# Patient Record
Sex: MALE | Race: WHITE | NOT HISPANIC OR LATINO | Employment: FULL TIME | URBAN - METROPOLITAN AREA
[De-identification: names, ages, dates, MRNs, and addresses within clinical notes are randomized per-mention and may not be internally consistent; named-entity substitution may affect disease eponyms.]

---

## 2023-06-24 ENCOUNTER — HOSPITAL ENCOUNTER (EMERGENCY)
Facility: MEDICAL CENTER | Age: 58
End: 2023-06-24
Attending: EMERGENCY MEDICINE
Payer: COMMERCIAL

## 2023-06-24 VITALS
DIASTOLIC BLOOD PRESSURE: 72 MMHG | TEMPERATURE: 97.5 F | HEART RATE: 68 BPM | BODY MASS INDEX: 34.16 KG/M2 | SYSTOLIC BLOOD PRESSURE: 112 MMHG | WEIGHT: 252.21 LBS | HEIGHT: 72 IN | OXYGEN SATURATION: 96 % | RESPIRATION RATE: 16 BRPM

## 2023-06-24 DIAGNOSIS — S51.812A LACERATION OF LEFT FOREARM, INITIAL ENCOUNTER: ICD-10-CM

## 2023-06-24 PROCEDURE — 90471 IMMUNIZATION ADMIN: CPT

## 2023-06-24 PROCEDURE — 303747 HCHG EXTRA SUTURE

## 2023-06-24 PROCEDURE — 700111 HCHG RX REV CODE 636 W/ 250 OVERRIDE (IP): Performed by: EMERGENCY MEDICINE

## 2023-06-24 PROCEDURE — 90715 TDAP VACCINE 7 YRS/> IM: CPT | Performed by: EMERGENCY MEDICINE

## 2023-06-24 PROCEDURE — 304999 HCHG REPAIR-SIMPLE/INTERMED LEVEL 1

## 2023-06-24 PROCEDURE — 700102 HCHG RX REV CODE 250 W/ 637 OVERRIDE(OP): Performed by: EMERGENCY MEDICINE

## 2023-06-24 PROCEDURE — 700101 HCHG RX REV CODE 250: Performed by: EMERGENCY MEDICINE

## 2023-06-24 PROCEDURE — A9270 NON-COVERED ITEM OR SERVICE: HCPCS | Performed by: EMERGENCY MEDICINE

## 2023-06-24 PROCEDURE — 304217 HCHG IRRIGATION SYSTEM

## 2023-06-24 PROCEDURE — 99283 EMERGENCY DEPT VISIT LOW MDM: CPT

## 2023-06-24 RX ORDER — LIDOCAINE HYDROCHLORIDE 20 MG/ML
20 INJECTION, SOLUTION INFILTRATION; PERINEURAL ONCE
Status: COMPLETED | OUTPATIENT
Start: 2023-06-24 | End: 2023-06-24

## 2023-06-24 RX ORDER — KETOROLAC TROMETHAMINE 10 MG/1
10 TABLET, FILM COATED ORAL 3 TIMES DAILY PRN
Qty: 15 TABLET | Refills: 0 | Status: SHIPPED | OUTPATIENT
Start: 2023-06-24

## 2023-06-24 RX ORDER — CEPHALEXIN 500 MG/1
500 CAPSULE ORAL ONCE
Status: COMPLETED | OUTPATIENT
Start: 2023-06-24 | End: 2023-06-24

## 2023-06-24 RX ORDER — IBUPROFEN 600 MG/1
600 TABLET ORAL ONCE
Status: COMPLETED | OUTPATIENT
Start: 2023-06-24 | End: 2023-06-24

## 2023-06-24 RX ORDER — CEPHALEXIN 500 MG/1
500 CAPSULE ORAL 4 TIMES DAILY
Qty: 40 CAPSULE | Refills: 0 | Status: ACTIVE | OUTPATIENT
Start: 2023-06-24

## 2023-06-24 RX ADMIN — IBUPROFEN 600 MG: 600 TABLET, FILM COATED ORAL at 21:08

## 2023-06-24 RX ADMIN — CLOSTRIDIUM TETANI TOXOID ANTIGEN (FORMALDEHYDE INACTIVATED), CORYNEBACTERIUM DIPHTHERIAE TOXOID ANTIGEN (FORMALDEHYDE INACTIVATED), BORDETELLA PERTUSSIS TOXOID ANTIGEN (GLUTARALDEHYDE INACTIVATED), BORDETELLA PERTUSSIS FILAMENTOUS HEMAGGLUTININ ANTIGEN (FORMALDEHYDE INACTIVATED), BORDETELLA PERTUSSIS PERTACTIN ANTIGEN, AND BORDETELLA PERTUSSIS FIMBRIAE 2/3 ANTIGEN 0.5 ML: 5; 2; 2.5; 5; 3; 5 INJECTION, SUSPENSION INTRAMUSCULAR at 21:07

## 2023-06-24 RX ADMIN — LIDOCAINE HYDROCHLORIDE 20 ML: 20 INJECTION, SOLUTION INFILTRATION; PERINEURAL at 20:30

## 2023-06-24 RX ADMIN — CEPHALEXIN 500 MG: 500 CAPSULE ORAL at 21:08

## 2023-06-25 NOTE — ED TRIAGE NOTES
Chief Complaint   Patient presents with    Arm Injury     The pt was working with tension loaded spring on overhead garage door. The pt sustained a laceration to left forearm. Bleeding controled in triage. The positive csm in the affected extremity. No other trauma       Pt ambulatory to triage. Pt A&Ox4, for tian shasta complaint.     Pt to lobby . Pt educated on alerting staff in changes to condition. Pt verbalized understanding.      /71   Pulse 66   Temp 36.4 °C (97.5 °F) (Temporal)   Resp 18   Ht 1.829 m (6')   Wt 114 kg (252 lb 3.3 oz)   SpO2 96% Comment: Room air  BMI 34.21 kg/m²

## 2023-06-25 NOTE — ED PROVIDER NOTES
ER Provider Note    Scribed for Dr. Edna Bridges D.O. by Marquez Fernandez. 6/24/2023  7:57 PM    Primary Care Provider: ZOHAIB Vaughan    CHIEF COMPLAINT  Chief Complaint   Patient presents with    Arm Injury     The pt was working with tension loaded spring on overhead garage door. The pt sustained a laceration to left forearm. Bleeding controled in triage. The positive csm in the affected extremity. No other trauma       EXTERNAL RECORDS REVIEWED  Care everywhere The patient was last seen 8/01/2012 for a head laceration in the ED.     HPI/ROS  LIMITATION TO HISTORY   Select: : None    OUTSIDE HISTORIAN(S):  Family at bedside    Marlo Gusman is a 58 y.o. male who presents to the ED for an injury to the left forearm onset prior to arrival. The patient states that he his arm got stuck in a metal spring on the garage door while attempting to fix it. Marlo is experiencing associated symptoms of bleeding in his left arm and pain.  He denies any distal paresthesias or weakness in his hand.  The patient is right handed and does not recall last tetanus shot. The patient has a surgical history of a knee replacement previously. The patient is not have any drug related allergies.     PAST MEDICAL HISTORY  History reviewed. No pertinent past medical history.    SURGICAL HISTORY  History reviewed. No pertinent surgical history.    FAMILY HISTORY  History reviewed. No pertinent family history.    SOCIAL HISTORY   reports that he has never smoked. He has never used smokeless tobacco. He reports current alcohol use. He reports current drug use.    CURRENT MEDICATIONS  Previous Medications    ASPIRIN EC (ECOTRIN) 81 MG TBEC    Take 81 mg by mouth every day.    THERAPEUTIC MULTIVITAMIN-MINERALS (THERAGRAN-M) TABS    Take 1 Tab by mouth every day.     ALLERGIES  Patient has no known allergies.    PHYSICAL EXAM  /71   Pulse 66   Temp 36.4 °C (97.5 °F) (Temporal)   Resp 18   Ht 1.829 m (6')   Wt 114 kg (252 lb 3.3  oz)   SpO2 96% Comment: Room air  BMI 34.21 kg/m²   Constitutional: Patient is well developed, well nourished.  Moderate distress from his injury.  HENT: Normocephalic, atraumatic.  Oral mucosa moist.  Cardiovascular: Normal heart rate and Regular rhythm. No murmur   Thorax & Lungs: Clear and equal breath sounds with good excursion. No respiratory distress   Abdomen: Bowel sounds normal in all four quadrants. Soft,nontender  Skin: Warm, Dry   Extremities:  8cm irregular laceration of the left lateral distal forearm extending through the dermis to the tendon. Dirty wound. Good capillary refill with distal sensation to all fingers. Neurovascular intact. Peripheral pulses 4/4      Neurologic: Alert & oriented x 3, Normal motor function, Normal sensory function  Psychiatric: Affect normal, Judgment normal, Mood normal.     PROCEDURES  Laceration Repair Procedure Note    Indication: Laceration    Procedure: The patient was placed in the appropriate position and anesthesia around the laceration was obtained by infiltration using 7cc of 2% Lidocaine with epinephrine. The wound was highly contaminated .The area was then irrigated with high pressure normal saline. The laceration was closed with 5-0 Ethilon using multiple interrupted sutures. There were no additional lacerations requiring repair. The wound area was then dressed with a sterile dressing and a bandage.      Total repaired wound length: 8 cm.     Other Items: Suture count: 21    The patient tolerated the procedure well.    Complications: None    COURSE & MEDICAL DECISION MAKING    ED Observation Status? No; Patient does not meet criteria for ED Observation.     INITIAL ASSESSMENT AND PLAN  Care Narrative:       7:57 PM - Patient seen and evaluated at bedside. The patient was complaining of a injury to his left arm due to an incident while working on his garage door. Discussed plan of care, including laceration repair. Patient agrees to plan of care. Pending  laceration repair.     8:16 PM - I evaluated the patient's medical records and saw that he was not up to date on his tetanus shot. Tetanus given now.    8:53 PM - Patient was reevaluated at bedside. Laceration repair performed, see above. Given his gaping laceration and contamination within the wound, will start on prophylactic Keflex. Will receive one 500 mg capsule here in addition to 600 mg Motrin for pain. Informed him of proper at home care of the laceration. Patient will now be discharged at this time. Discussed return precautions and plan for at home care. Patient verbalizes understanding and agreement to this plan of care.                     DISPOSITION AND DISCUSSIONS  I have discussed management of the patient with the following physicians and ROBERT's: none    Discussion of management with other [facility] or appropriate source(s): None     Escalation of care considered, and ultimately not performed: the patient was evaluated by myself, after discussion I have recommended the patient to be discharged.    Barriers to care at this time, including but not limited to: None    Decision tools and prescription drugs considered including, but not limited to: Antibiotics Keflex started and Pain Medications Motrin for pain.    DISPOSITION:  Patient will be discharged home in stable condition.    FOLLOW UP:  FERNANDO VaughanPKongNKong  [address]  [address] [address] [phone]    Schedule an appointment as soon as possible for a visit in 10 days  For suture removal    OUTPATIENT MEDICATIONS:  New Prescriptions    CEPHALEXIN (KEFLEX) 500 MG CAP    Take 1 Capsule by mouth 4 times a day.    KETOROLAC (TORADOL) 10 MG TAB    Take 1 Tablet by mouth 3 times a day as needed for Moderate Pain. Take with food     FINAL IMPRESSION   1. Laceration of left forearm, initial encounter      I, Marquez Fernandez (Manisha), am scribing for, and in the presence of, Edna Bridges D.O..    Electronically signed by: Marquez  Rebecca (Scribe), 6/24/2023    IEdna D.O. personally performed the services described in this documentation, as scribed by Marquez Fernandez in my presence, and it is both accurate and complete.    The note accurately reflects work and decisions made by me.  Edna Bridges D.O.  6/25/2023  4:17 AM

## 2023-06-25 NOTE — DISCHARGE INSTRUCTIONS
Clean your wound daily with antibacterial soap and water, apply thin layer of Neosporin for the first 3 days only and then keep covered with a padded gauze dressing.  After that you will just clean with antibacterial soap and water, dry and keep open to the air.  Sutures come out in 7 to 10 days with your primary care provider or urgent care.  Tylenol or ibuprofen for pain  Take the antibiotics until completely gone  Return to the ER if any increased redness, swelling, drainage, fever greater than 101 or pain out of proportion.

## 2025-05-14 ENCOUNTER — APPOINTMENT (OUTPATIENT)
Dept: RADIOLOGY | Facility: MEDICAL CENTER | Age: 60
DRG: 871 | End: 2025-05-14
Attending: EMERGENCY MEDICINE
Payer: COMMERCIAL

## 2025-05-14 ENCOUNTER — HOSPITAL ENCOUNTER (INPATIENT)
Facility: MEDICAL CENTER | Age: 60
LOS: 13 days | DRG: 871 | End: 2025-05-27
Attending: EMERGENCY MEDICINE | Admitting: STUDENT IN AN ORGANIZED HEALTH CARE EDUCATION/TRAINING PROGRAM
Payer: COMMERCIAL

## 2025-05-14 ENCOUNTER — APPOINTMENT (OUTPATIENT)
Dept: RADIOLOGY | Facility: MEDICAL CENTER | Age: 60
DRG: 871 | End: 2025-05-14
Payer: COMMERCIAL

## 2025-05-14 DIAGNOSIS — J18.9 PNEUMONIA OF RIGHT LOWER LOBE DUE TO INFECTIOUS ORGANISM: ICD-10-CM

## 2025-05-14 DIAGNOSIS — J90 PLEURAL EFFUSION ON RIGHT: ICD-10-CM

## 2025-05-14 DIAGNOSIS — I48.91 ATRIAL FIBRILLATION WITH RVR (HCC): ICD-10-CM

## 2025-05-14 DIAGNOSIS — F10.90 ALCOHOL USE: ICD-10-CM

## 2025-05-14 DIAGNOSIS — R10.9 FLANK PAIN: Primary | ICD-10-CM

## 2025-05-14 DIAGNOSIS — I95.9 HYPOTENSION, UNSPECIFIED HYPOTENSION TYPE: ICD-10-CM

## 2025-05-14 PROBLEM — N17.9 AKI (ACUTE KIDNEY INJURY) (HCC): Status: ACTIVE | Noted: 2025-05-14

## 2025-05-14 PROBLEM — A41.9 SEPSIS (HCC): Status: RESOLVED | Noted: 2025-05-14 | Resolved: 2025-05-14

## 2025-05-14 PROBLEM — E87.6 HYPOKALEMIA: Status: ACTIVE | Noted: 2025-05-14

## 2025-05-14 PROBLEM — A41.9 SEPSIS (HCC): Status: ACTIVE | Noted: 2025-05-14

## 2025-05-14 PROBLEM — J96.01 ACUTE HYPOXIC RESPIRATORY FAILURE (HCC): Status: ACTIVE | Noted: 2025-05-14

## 2025-05-14 PROBLEM — R91.8 MASS OF LOWER LOBE OF LEFT LUNG: Status: ACTIVE | Noted: 2025-05-14

## 2025-05-14 PROBLEM — E87.29 HIGH ANION GAP METABOLIC ACIDOSIS: Status: ACTIVE | Noted: 2025-05-14

## 2025-05-14 PROBLEM — I10 HYPERTENSION: Status: ACTIVE | Noted: 2025-05-14

## 2025-05-14 LAB
ABO + RH BLD: NORMAL
ABO GROUP BLD: NORMAL
ALBUMIN SERPL BCP-MCNC: 4 G/DL (ref 3.2–4.9)
ALBUMIN/GLOB SERPL: 1.1 G/DL
ALP SERPL-CCNC: 97 U/L (ref 30–99)
ALT SERPL-CCNC: 6 U/L (ref 2–50)
ANION GAP SERPL CALC-SCNC: 17 MMOL/L (ref 7–16)
APTT PPP: 27 SEC (ref 24.7–36)
AST SERPL-CCNC: 12 U/L (ref 12–45)
BASOPHILS # BLD AUTO: 0.4 % (ref 0–1.8)
BASOPHILS # BLD: 0.06 K/UL (ref 0–0.12)
BILIRUB SERPL-MCNC: 1 MG/DL (ref 0.1–1.5)
BLD GP AB SCN SERPL QL: NORMAL
BUN SERPL-MCNC: 28 MG/DL (ref 8–22)
CALCIUM ALBUM COR SERPL-MCNC: 9.2 MG/DL (ref 8.5–10.5)
CALCIUM SERPL-MCNC: 9.2 MG/DL (ref 8.5–10.5)
CHLORIDE SERPL-SCNC: 99 MMOL/L (ref 96–112)
CO2 SERPL-SCNC: 20 MMOL/L (ref 20–33)
CREAT SERPL-MCNC: 1.63 MG/DL (ref 0.5–1.4)
EKG IMPRESSION: NORMAL
EOSINOPHIL # BLD AUTO: 0.17 K/UL (ref 0–0.51)
EOSINOPHIL NFR BLD: 1.3 % (ref 0–6.9)
ERYTHROCYTE [DISTWIDTH] IN BLOOD BY AUTOMATED COUNT: 40.4 FL (ref 35.9–50)
EST. AVERAGE GLUCOSE BLD GHB EST-MCNC: 108 MG/DL
GFR SERPLBLD CREATININE-BSD FMLA CKD-EPI: 48 ML/MIN/1.73 M 2
GLOBULIN SER CALC-MCNC: 3.6 G/DL (ref 1.9–3.5)
GLUCOSE SERPL-MCNC: 111 MG/DL (ref 65–99)
HBA1C MFR BLD: 5.4 % (ref 4–5.6)
HCT VFR BLD AUTO: 44.2 % (ref 42–52)
HGB BLD-MCNC: 15 G/DL (ref 14–18)
IMM GRANULOCYTES # BLD AUTO: 0.08 K/UL (ref 0–0.11)
IMM GRANULOCYTES NFR BLD AUTO: 0.6 % (ref 0–0.9)
INR PPP: 1.15 (ref 0.87–1.13)
LACTATE SERPL-SCNC: 1.2 MMOL/L (ref 0.5–2)
LACTATE SERPL-SCNC: 2.2 MMOL/L (ref 0.5–2)
LDH SERPL L TO P-CCNC: 245 U/L (ref 107–266)
LIPASE SERPL-CCNC: 36 U/L (ref 11–82)
LYMPHOCYTES # BLD AUTO: 1.39 K/UL (ref 1–4.8)
LYMPHOCYTES NFR BLD: 10.4 % (ref 22–41)
MAGNESIUM SERPL-MCNC: 2.2 MG/DL (ref 1.5–2.5)
MCH RBC QN AUTO: 30.4 PG (ref 27–33)
MCHC RBC AUTO-ENTMCNC: 33.9 G/DL (ref 32.3–36.5)
MCV RBC AUTO: 89.5 FL (ref 81.4–97.8)
MONOCYTES # BLD AUTO: 0.74 K/UL (ref 0–0.85)
MONOCYTES NFR BLD AUTO: 5.5 % (ref 0–13.4)
NEUTROPHILS # BLD AUTO: 10.94 K/UL (ref 1.82–7.42)
NEUTROPHILS NFR BLD: 81.8 % (ref 44–72)
NRBC # BLD AUTO: 0 K/UL
NRBC BLD-RTO: 0 /100 WBC (ref 0–0.2)
PLATELET # BLD AUTO: 433 K/UL (ref 164–446)
PMV BLD AUTO: 9.5 FL (ref 9–12.9)
POTASSIUM SERPL-SCNC: 3.4 MMOL/L (ref 3.6–5.5)
PROT SERPL-MCNC: 7.6 G/DL (ref 6–8.2)
PROTHROMBIN TIME: 14.7 SEC (ref 12–14.6)
RBC # BLD AUTO: 4.94 M/UL (ref 4.7–6.1)
RH BLD: NORMAL
SODIUM SERPL-SCNC: 136 MMOL/L (ref 135–145)
TROPONIN T SERPL-MCNC: 9 NG/L (ref 6–19)
WBC # BLD AUTO: 13.4 K/UL (ref 4.8–10.8)

## 2025-05-14 PROCEDURE — 86850 RBC ANTIBODY SCREEN: CPT

## 2025-05-14 PROCEDURE — 86900 BLOOD TYPING SEROLOGIC ABO: CPT | Mod: 91

## 2025-05-14 PROCEDURE — 96375 TX/PRO/DX INJ NEW DRUG ADDON: CPT

## 2025-05-14 PROCEDURE — 700111 HCHG RX REV CODE 636 W/ 250 OVERRIDE (IP): Mod: JZ | Performed by: STUDENT IN AN ORGANIZED HEALTH CARE EDUCATION/TRAINING PROGRAM

## 2025-05-14 PROCEDURE — 700111 HCHG RX REV CODE 636 W/ 250 OVERRIDE (IP): Mod: JZ | Performed by: EMERGENCY MEDICINE

## 2025-05-14 PROCEDURE — 83615 LACTATE (LD) (LDH) ENZYME: CPT

## 2025-05-14 PROCEDURE — 32555 ASPIRATE PLEURA W/ IMAGING: CPT | Performed by: STUDENT IN AN ORGANIZED HEALTH CARE EDUCATION/TRAINING PROGRAM

## 2025-05-14 PROCEDURE — A9270 NON-COVERED ITEM OR SERVICE: HCPCS

## 2025-05-14 PROCEDURE — 85730 THROMBOPLASTIN TIME PARTIAL: CPT

## 2025-05-14 PROCEDURE — 83605 ASSAY OF LACTIC ACID: CPT

## 2025-05-14 PROCEDURE — 700111 HCHG RX REV CODE 636 W/ 250 OVERRIDE (IP): Mod: JZ

## 2025-05-14 PROCEDURE — 83735 ASSAY OF MAGNESIUM: CPT

## 2025-05-14 PROCEDURE — 700101 HCHG RX REV CODE 250: Performed by: EMERGENCY MEDICINE

## 2025-05-14 PROCEDURE — 36415 COLL VENOUS BLD VENIPUNCTURE: CPT

## 2025-05-14 PROCEDURE — 85025 COMPLETE CBC W/AUTO DIFF WBC: CPT

## 2025-05-14 PROCEDURE — 83690 ASSAY OF LIPASE: CPT

## 2025-05-14 PROCEDURE — 87040 BLOOD CULTURE FOR BACTERIA: CPT

## 2025-05-14 PROCEDURE — A9270 NON-COVERED ITEM OR SERVICE: HCPCS | Performed by: EMERGENCY MEDICINE

## 2025-05-14 PROCEDURE — 96361 HYDRATE IV INFUSION ADD-ON: CPT

## 2025-05-14 PROCEDURE — 0WJ93ZZ INSPECTION OF RIGHT PLEURAL CAVITY, PERCUTANEOUS APPROACH: ICD-10-PCS | Performed by: STUDENT IN AN ORGANIZED HEALTH CARE EDUCATION/TRAINING PROGRAM

## 2025-05-14 PROCEDURE — 87641 MR-STAPH DNA AMP PROBE: CPT

## 2025-05-14 PROCEDURE — 96365 THER/PROPH/DIAG IV INF INIT: CPT

## 2025-05-14 PROCEDURE — 94760 N-INVAS EAR/PLS OXIMETRY 1: CPT

## 2025-05-14 PROCEDURE — 700102 HCHG RX REV CODE 250 W/ 637 OVERRIDE(OP)

## 2025-05-14 PROCEDURE — 71045 X-RAY EXAM CHEST 1 VIEW: CPT

## 2025-05-14 PROCEDURE — 32554 ASPIRATE PLEURA W/O IMAGING: CPT

## 2025-05-14 PROCEDURE — 96368 THER/DIAG CONCURRENT INF: CPT

## 2025-05-14 PROCEDURE — 99291 CRITICAL CARE FIRST HOUR: CPT | Mod: 25,GC | Performed by: STUDENT IN AN ORGANIZED HEALTH CARE EDUCATION/TRAINING PROGRAM

## 2025-05-14 PROCEDURE — 83036 HEMOGLOBIN GLYCOSYLATED A1C: CPT

## 2025-05-14 PROCEDURE — 86901 BLOOD TYPING SEROLOGIC RH(D): CPT

## 2025-05-14 PROCEDURE — 93005 ELECTROCARDIOGRAM TRACING: CPT | Mod: TC

## 2025-05-14 PROCEDURE — 84484 ASSAY OF TROPONIN QUANT: CPT

## 2025-05-14 PROCEDURE — 80053 COMPREHEN METABOLIC PANEL: CPT

## 2025-05-14 PROCEDURE — 700105 HCHG RX REV CODE 258: Performed by: EMERGENCY MEDICINE

## 2025-05-14 PROCEDURE — 99291 CRITICAL CARE FIRST HOUR: CPT

## 2025-05-14 PROCEDURE — 93005 ELECTROCARDIOGRAM TRACING: CPT | Mod: TC | Performed by: EMERGENCY MEDICINE

## 2025-05-14 PROCEDURE — 96367 TX/PROPH/DG ADDL SEQ IV INF: CPT

## 2025-05-14 PROCEDURE — 85610 PROTHROMBIN TIME: CPT

## 2025-05-14 PROCEDURE — 700102 HCHG RX REV CODE 250 W/ 637 OVERRIDE(OP): Performed by: EMERGENCY MEDICINE

## 2025-05-14 PROCEDURE — 700117 HCHG RX CONTRAST REV CODE 255: Performed by: EMERGENCY MEDICINE

## 2025-05-14 PROCEDURE — 770022 HCHG ROOM/CARE - ICU (200)

## 2025-05-14 PROCEDURE — 74175 CTA ABDOMEN W/CONTRAST: CPT

## 2025-05-14 RX ORDER — DILTIAZEM HYDROCHLORIDE 5 MG/ML
10 INJECTION INTRAVENOUS ONCE
Status: DISCONTINUED | OUTPATIENT
Start: 2025-05-14 | End: 2025-05-14

## 2025-05-14 RX ORDER — PROMETHAZINE HYDROCHLORIDE 25 MG/1
12.5-25 TABLET ORAL EVERY 4 HOURS PRN
Status: DISCONTINUED | OUTPATIENT
Start: 2025-05-14 | End: 2025-05-27 | Stop reason: HOSPADM

## 2025-05-14 RX ORDER — LOSARTAN POTASSIUM 100 MG/1
100 TABLET ORAL DAILY
COMMUNITY

## 2025-05-14 RX ORDER — IBUPROFEN 200 MG
800 TABLET ORAL
Status: ON HOLD | COMMUNITY
End: 2025-05-27

## 2025-05-14 RX ORDER — HEPARIN SODIUM 5000 [USP'U]/ML
5000 INJECTION, SOLUTION INTRAVENOUS; SUBCUTANEOUS EVERY 8 HOURS
Status: DISCONTINUED | OUTPATIENT
Start: 2025-05-14 | End: 2025-05-15

## 2025-05-14 RX ORDER — HYDROMORPHONE HYDROCHLORIDE 1 MG/ML
0.25 INJECTION, SOLUTION INTRAMUSCULAR; INTRAVENOUS; SUBCUTANEOUS
Status: DISCONTINUED | OUTPATIENT
Start: 2025-05-14 | End: 2025-05-27 | Stop reason: HOSPADM

## 2025-05-14 RX ORDER — ASPIRIN 325 MG
325-650 TABLET ORAL
COMMUNITY

## 2025-05-14 RX ORDER — NOREPINEPHRINE BITARTRATE 0.03 MG/ML
0-1 INJECTION, SOLUTION INTRAVENOUS CONTINUOUS
Status: DISCONTINUED | OUTPATIENT
Start: 2025-05-14 | End: 2025-05-14

## 2025-05-14 RX ORDER — SODIUM CHLORIDE, SODIUM LACTATE, POTASSIUM CHLORIDE, CALCIUM CHLORIDE 600; 310; 30; 20 MG/100ML; MG/100ML; MG/100ML; MG/100ML
1000 INJECTION, SOLUTION INTRAVENOUS ONCE
Status: COMPLETED | OUTPATIENT
Start: 2025-05-14 | End: 2025-05-14

## 2025-05-14 RX ORDER — AMLODIPINE BESYLATE 10 MG/1
10 TABLET ORAL DAILY
COMMUNITY

## 2025-05-14 RX ORDER — AZITHROMYCIN 250 MG/1
500 TABLET, FILM COATED ORAL ONCE
Status: COMPLETED | OUTPATIENT
Start: 2025-05-14 | End: 2025-05-14

## 2025-05-14 RX ORDER — POTASSIUM CHLORIDE 7.45 MG/ML
10 INJECTION INTRAVENOUS
Status: DISPENSED | OUTPATIENT
Start: 2025-05-14 | End: 2025-05-14

## 2025-05-14 RX ORDER — KETOROLAC TROMETHAMINE 15 MG/ML
15 INJECTION, SOLUTION INTRAMUSCULAR; INTRAVENOUS ONCE
Status: COMPLETED | OUTPATIENT
Start: 2025-05-14 | End: 2025-05-14

## 2025-05-14 RX ORDER — CYCLOBENZAPRINE HCL 10 MG
10 TABLET ORAL
COMMUNITY
Start: 2025-05-13 | End: 2025-05-27

## 2025-05-14 RX ORDER — ONDANSETRON 4 MG/1
4 TABLET, ORALLY DISINTEGRATING ORAL EVERY 4 HOURS PRN
Status: DISCONTINUED | OUTPATIENT
Start: 2025-05-14 | End: 2025-05-27 | Stop reason: HOSPADM

## 2025-05-14 RX ORDER — ONDANSETRON 2 MG/ML
4 INJECTION INTRAMUSCULAR; INTRAVENOUS ONCE
Status: COMPLETED | OUTPATIENT
Start: 2025-05-14 | End: 2025-05-14

## 2025-05-14 RX ORDER — BENZONATATE 100 MG/1
100 CAPSULE ORAL 3 TIMES DAILY PRN
Status: ON HOLD | COMMUNITY
Start: 2025-05-13 | End: 2025-05-27

## 2025-05-14 RX ORDER — AZITHROMYCIN 250 MG/1
500 TABLET, FILM COATED ORAL DAILY
Status: COMPLETED | OUTPATIENT
Start: 2025-05-15 | End: 2025-05-16

## 2025-05-14 RX ORDER — HYDROCHLOROTHIAZIDE 25 MG/1
25 TABLET ORAL DAILY
COMMUNITY
End: 2026-01-30

## 2025-05-14 RX ORDER — ACETAMINOPHEN 500 MG
1000 TABLET ORAL EVERY 6 HOURS
Status: DISPENSED | OUTPATIENT
Start: 2025-05-14 | End: 2025-05-19

## 2025-05-14 RX ORDER — AMOXICILLIN 250 MG
2 CAPSULE ORAL EVERY EVENING
Status: DISCONTINUED | OUTPATIENT
Start: 2025-05-15 | End: 2025-05-27 | Stop reason: HOSPADM

## 2025-05-14 RX ORDER — ACETAMINOPHEN 500 MG
1000 TABLET ORAL EVERY 6 HOURS PRN
Status: DISCONTINUED | OUTPATIENT
Start: 2025-05-19 | End: 2025-05-23

## 2025-05-14 RX ORDER — POLYETHYLENE GLYCOL 3350 17 G/17G
1 POWDER, FOR SOLUTION ORAL
Status: DISCONTINUED | OUTPATIENT
Start: 2025-05-14 | End: 2025-05-27 | Stop reason: HOSPADM

## 2025-05-14 RX ORDER — NOREPINEPHRINE BITARTRATE 0.03 MG/ML
0-1 INJECTION, SOLUTION INTRAVENOUS CONTINUOUS
Status: DISCONTINUED | OUTPATIENT
Start: 2025-05-14 | End: 2025-05-19

## 2025-05-14 RX ORDER — OXYCODONE HYDROCHLORIDE 5 MG/1
2.5 TABLET ORAL
Refills: 0 | Status: DISCONTINUED | OUTPATIENT
Start: 2025-05-14 | End: 2025-05-27 | Stop reason: HOSPADM

## 2025-05-14 RX ORDER — OXYCODONE HYDROCHLORIDE 5 MG/1
5 TABLET ORAL
Refills: 0 | Status: DISCONTINUED | OUTPATIENT
Start: 2025-05-14 | End: 2025-05-27 | Stop reason: HOSPADM

## 2025-05-14 RX ORDER — ONDANSETRON 2 MG/ML
4 INJECTION INTRAMUSCULAR; INTRAVENOUS EVERY 4 HOURS PRN
Status: DISCONTINUED | OUTPATIENT
Start: 2025-05-14 | End: 2025-05-27 | Stop reason: HOSPADM

## 2025-05-14 RX ADMIN — POTASSIUM CHLORIDE 10 MEQ: 10 INJECTION, SOLUTION INTRAVENOUS at 20:41

## 2025-05-14 RX ADMIN — ACETAMINOPHEN 1000 MG: 500 TABLET ORAL at 23:18

## 2025-05-14 RX ADMIN — AZITHROMYCIN DIHYDRATE 500 MG: 250 TABLET ORAL at 18:02

## 2025-05-14 RX ADMIN — ONDANSETRON 4 MG: 2 INJECTION INTRAMUSCULAR; INTRAVENOUS at 17:07

## 2025-05-14 RX ADMIN — SODIUM CHLORIDE, POTASSIUM CHLORIDE, SODIUM LACTATE AND CALCIUM CHLORIDE 1000 ML: 600; 310; 30; 20 INJECTION, SOLUTION INTRAVENOUS at 16:48

## 2025-05-14 RX ADMIN — POTASSIUM CHLORIDE 10 MEQ: 10 INJECTION, SOLUTION INTRAVENOUS at 19:09

## 2025-05-14 RX ADMIN — ACETAMINOPHEN 1000 MG: 500 TABLET ORAL at 19:09

## 2025-05-14 RX ADMIN — FENTANYL CITRATE 100 MCG: 50 INJECTION, SOLUTION INTRAMUSCULAR; INTRAVENOUS at 18:48

## 2025-05-14 RX ADMIN — POTASSIUM CHLORIDE 10 MEQ: 10 INJECTION, SOLUTION INTRAVENOUS at 23:26

## 2025-05-14 RX ADMIN — SODIUM CHLORIDE, POTASSIUM CHLORIDE, SODIUM LACTATE AND CALCIUM CHLORIDE 1000 ML: 600; 310; 30; 20 INJECTION, SOLUTION INTRAVENOUS at 17:15

## 2025-05-14 RX ADMIN — AMPICILLIN AND SULBACTAM 3 G: 1; 2 INJECTION, POWDER, FOR SOLUTION INTRAMUSCULAR; INTRAVENOUS at 18:00

## 2025-05-14 RX ADMIN — IOHEXOL 80 ML: 350 INJECTION, SOLUTION INTRAVENOUS at 17:30

## 2025-05-14 RX ADMIN — KETOROLAC TROMETHAMINE 15 MG: 15 INJECTION, SOLUTION INTRAMUSCULAR; INTRAVENOUS at 20:36

## 2025-05-14 RX ADMIN — NOREPINEPHRINE BITARTRATE 0.05 MCG/KG/MIN: 1 INJECTION, SOLUTION, CONCENTRATE INTRAVENOUS at 18:14

## 2025-05-14 RX ADMIN — SODIUM CHLORIDE, POTASSIUM CHLORIDE, SODIUM LACTATE AND CALCIUM CHLORIDE 1000 ML: 600; 310; 30; 20 INJECTION, SOLUTION INTRAVENOUS at 17:59

## 2025-05-14 RX ADMIN — FENTANYL CITRATE 50 MCG: 50 INJECTION, SOLUTION INTRAMUSCULAR; INTRAVENOUS at 17:07

## 2025-05-14 SDOH — ECONOMIC STABILITY: TRANSPORTATION INSECURITY
IN THE PAST 12 MONTHS, HAS THE LACK OF TRANSPORTATION KEPT YOU FROM MEDICAL APPOINTMENTS OR FROM GETTING MEDICATIONS?: NO

## 2025-05-14 SDOH — ECONOMIC STABILITY: TRANSPORTATION INSECURITY
IN THE PAST 12 MONTHS, HAS LACK OF RELIABLE TRANSPORTATION KEPT YOU FROM MEDICAL APPOINTMENTS, MEETINGS, WORK OR FROM GETTING THINGS NEEDED FOR DAILY LIVING?: NO

## 2025-05-14 ASSESSMENT — PAIN DESCRIPTION - PAIN TYPE
TYPE: ACUTE PAIN

## 2025-05-14 ASSESSMENT — COGNITIVE AND FUNCTIONAL STATUS - GENERAL
DRESSING REGULAR LOWER BODY CLOTHING: A LITTLE
HELP NEEDED FOR BATHING: A LITTLE
DRESSING REGULAR UPPER BODY CLOTHING: A LITTLE
SUGGESTED CMS G CODE MODIFIER MOBILITY: CH
DAILY ACTIVITIY SCORE: 21
MOBILITY SCORE: 24
SUGGESTED CMS G CODE MODIFIER DAILY ACTIVITY: CJ

## 2025-05-14 ASSESSMENT — LIFESTYLE VARIABLES
ON A TYPICAL DAY WHEN YOU DRINK ALCOHOL HOW MANY DRINKS DO YOU HAVE: 0
EVER FELT BAD OR GUILTY ABOUT YOUR DRINKING: NO
DOES PATIENT WANT TO STOP DRINKING: YES
AVERAGE NUMBER OF DAYS PER WEEK YOU HAVE A DRINK CONTAINING ALCOHOL: 0
ALCOHOL_USE: NO
TOTAL SCORE: 1
HAVE YOU EVER FELT YOU SHOULD CUT DOWN ON YOUR DRINKING: YES
DOES PATIENT WANT TO TALK TO SOMEONE ABOUT QUITTING: NO
HAVE PEOPLE ANNOYED YOU BY CRITICIZING YOUR DRINKING: NO
CONSUMPTION TOTAL: NEGATIVE
EVER HAD A DRINK FIRST THING IN THE MORNING TO STEADY YOUR NERVES TO GET RID OF A HANGOVER: NO
TOTAL SCORE: 1
TOTAL SCORE: 1
HOW MANY TIMES IN THE PAST YEAR HAVE YOU HAD 5 OR MORE DRINKS IN A DAY: 0

## 2025-05-14 ASSESSMENT — ENCOUNTER SYMPTOMS
ABDOMINAL PAIN: 0
MYALGIAS: 1
DIZZINESS: 1
SHORTNESS OF BREATH: 1
VOMITING: 0
ORTHOPNEA: 0
COUGH: 1
BLOOD IN STOOL: 0
DIAPHORESIS: 1
WHEEZING: 0
DIARRHEA: 0
FEVER: 1
CHILLS: 1
NAUSEA: 1
SPUTUM PRODUCTION: 0
SORE THROAT: 0
BLURRED VISION: 0
HEMOPTYSIS: 0

## 2025-05-14 ASSESSMENT — SOCIAL DETERMINANTS OF HEALTH (SDOH)
WITHIN THE LAST YEAR, HAVE TO BEEN RAPED OR FORCED TO HAVE ANY KIND OF SEXUAL ACTIVITY BY YOUR PARTNER OR EX-PARTNER?: NO
WITHIN THE PAST 12 MONTHS, THE FOOD YOU BOUGHT JUST DIDN'T LAST AND YOU DIDN'T HAVE MONEY TO GET MORE: NEVER TRUE
WITHIN THE LAST YEAR, HAVE YOU BEEN KICKED, HIT, SLAPPED, OR OTHERWISE PHYSICALLY HURT BY YOUR PARTNER OR EX-PARTNER?: NO
WITHIN THE LAST YEAR, HAVE YOU BEEN HUMILIATED OR EMOTIONALLY ABUSED IN OTHER WAYS BY YOUR PARTNER OR EX-PARTNER?: NO
IN THE PAST 12 MONTHS, HAS THE ELECTRIC, GAS, OIL, OR WATER COMPANY THREATENED TO SHUT OFF SERVICE IN YOUR HOME?: NO
WITHIN THE LAST YEAR, HAVE YOU BEEN AFRAID OF YOUR PARTNER OR EX-PARTNER?: NO

## 2025-05-14 ASSESSMENT — FIBROSIS 4 INDEX: FIB4 SCORE: 0.69

## 2025-05-14 NOTE — ED TRIAGE NOTES
Chief Complaint   Patient presents with    Flank Pain     Right sided flank pain x 5 days, pt denies dysuria but reports pain is darker than normal. +nausea.        59 yo M to triage for above complaint. Abdominal pain protocol ordered.      Pt placed in lobby. Pt educated on triage process. Pt encouraged to alert staff for any changes.     Patient and staff wearing appropriate PPE    BP (!) 127/100   Pulse 88   Temp 36.6 °C (97.8 °F) (Temporal)   Resp (!) 24   Ht 1.829 m (6')   Wt 108 kg (238 lb)   SpO2 98%   BMI 32.28 kg/m²

## 2025-05-14 NOTE — ED PROVIDER NOTES
ER Provider Note    Scribed for Cem Hutton M.d. by Castro Strange. 5/14/2025  4:36 PM    Primary Care Provider: ZOHAIB Vaughan    CHIEF COMPLAINT   Chief Complaint   Patient presents with    Flank Pain     Right sided flank pain x 5 days, pt denies dysuria but reports pain is darker than normal. +nausea.      EXTERNAL RECORDS REVIEWED  Outpatient Notes seen at outpatient yesterday for cough and back pain they thought this was more muscle skeletal will start Flexeril    HPI/ROS  LIMITATION TO HISTORY   Select: : None  OUTSIDE HISTORIAN(S):  Significant other wife present at bedside to provide additional context to history    Marlo Gusman is a 60 y.o. male who presents to the ED complaining of right sided flank pain onset 5 days ago. The patient explains that he developed a worsening right sided flank pain 5 days ago. In response to this the patient decided to be seen in clinic yesterday where he was given muscle relaxants which provided no alleviation. The patient's pain has since persisted and he thus decided to present to the ED for evaluation today. He expresses concern for a possible kidney stone. He states associated shortness of breath, dizziness, and spasms thought his body secondary to the pain. He denies any ripping or tearing pain to his back and he denies his pain radiating to his chest. No testicular pain reported. The patient notes no histories of arrhythmia.     PAST MEDICAL HISTORY  History reviewed. No pertinent past medical history.    SURGICAL HISTORY  History reviewed. No pertinent surgical history.    FAMILY HISTORY  No family history noted.     SOCIAL HISTORY   reports that he has never smoked. He has never used smokeless tobacco. He reports current alcohol use. He reports current drug use.    CURRENT MEDICATIONS  Previous Medications    AMLODIPINE (NORVASC) 10 MG TAB    Take 10 mg by mouth every day.    ASPIRIN (ASA) 325 MG TAB    Take 325-650 mg by mouth 1 time a day as  needed for Mild Pain.    BENZONATATE (TESSALON) 100 MG CAP    Take 100 mg by mouth 3 times a day as needed for Cough. X 10 days    CYCLOBENZAPRINE (FLEXERIL) 10 MG TAB    Take 10 mg by mouth 1 time a day as needed for Muscle Spasms. X 14 days    HYDROCHLOROTHIAZIDE 25 MG TAB    Take 25 mg by mouth every day.    IBUPROFEN (MOTRIN) 200 MG TAB    Take 800 mg by mouth one time as needed for Mild Pain. 800 mg = 4 tabs    LOSARTAN (COZAAR) 100 MG TAB    Take 100 mg by mouth every day.     ALLERGIES  Patient has no known allergies.    PHYSICAL EXAM  BP (!) 127/100   Pulse 88   Temp 36.6 °C (97.8 °F) (Temporal)   Resp (!) 24   Ht 1.829 m (6')   Wt 108 kg (238 lb)   SpO2 98%   BMI 32.28 kg/m²     Constitutional: Well developed, Well nourished, Moderate distress.   HENT: Normocephalic, Atraumatic  Eyes: Conjunctiva normal, No discharge.   Cardiovascular: Tachycardic heart rate, Irregularly irregular rhythm, No murmurs, equal pulses.   Pulmonary: Normal breath sounds, No respiratory distress, No wheezing, No rales, No rhonchi.  Chest: No chest wall tenderness or deformity.   Abdomen:Soft, No tenderness, No pulsatile masses, no rebound, no guarding.   Back: No CVA tenderness. Back pain was not able to be reproduced with palpation.   Musculoskeletal: No major deformities noted, No tenderness.  No edema in his legs.  Skin: Warm, Dry, No erythema, No rash.   Neurologic: Alert & oriented x 3, Normal motor function,  No focal deficits noted.   Psychiatric: Affect normal, Judgment normal, Mood normal.     DIAGNOSTIC STUDIES    EKG/LABS  Results for orders placed or performed during the hospital encounter of 05/14/25   CBC WITH DIFFERENTIAL    Collection Time: 05/14/25  4:03 PM   Result Value Ref Range    WBC 13.4 (H) 4.8 - 10.8 K/uL    RBC 4.94 4.70 - 6.10 M/uL    Hemoglobin 15.0 14.0 - 18.0 g/dL    Hematocrit 44.2 42.0 - 52.0 %    MCV 89.5 81.4 - 97.8 fL    MCH 30.4 27.0 - 33.0 pg    MCHC 33.9 32.3 - 36.5 g/dL    RDW 40.4  35.9 - 50.0 fL    Platelet Count 433 164 - 446 K/uL    MPV 9.5 9.0 - 12.9 fL    Neutrophils-Polys 81.80 (H) 44.00 - 72.00 %    Lymphocytes 10.40 (L) 22.00 - 41.00 %    Monocytes 5.50 0.00 - 13.40 %    Eosinophils 1.30 0.00 - 6.90 %    Basophils 0.40 0.00 - 1.80 %    Immature Granulocytes 0.60 0.00 - 0.90 %    Nucleated RBC 0.00 0.00 - 0.20 /100 WBC    Neutrophils (Absolute) 10.94 (H) 1.82 - 7.42 K/uL    Lymphs (Absolute) 1.39 1.00 - 4.80 K/uL    Monos (Absolute) 0.74 0.00 - 0.85 K/uL    Eos (Absolute) 0.17 0.00 - 0.51 K/uL    Baso (Absolute) 0.06 0.00 - 0.12 K/uL    Immature Granulocytes (abs) 0.08 0.00 - 0.11 K/uL    NRBC (Absolute) 0.00 K/uL   COMP METABOLIC PANEL    Collection Time: 05/14/25  4:03 PM   Result Value Ref Range    Sodium 136 135 - 145 mmol/L    Potassium 3.4 (L) 3.6 - 5.5 mmol/L    Chloride 99 96 - 112 mmol/L    Co2 20 20 - 33 mmol/L    Anion Gap 17.0 (H) 7.0 - 16.0    Glucose 111 (H) 65 - 99 mg/dL    Bun 28 (H) 8 - 22 mg/dL    Creatinine 1.63 (H) 0.50 - 1.40 mg/dL    Calcium 9.2 8.5 - 10.5 mg/dL    Correct Calcium 9.2 8.5 - 10.5 mg/dL    AST(SGOT) 12 12 - 45 U/L    ALT(SGPT) 6 2 - 50 U/L    Alkaline Phosphatase 97 30 - 99 U/L    Total Bilirubin 1.0 0.1 - 1.5 mg/dL    Albumin 4.0 3.2 - 4.9 g/dL    Total Protein 7.6 6.0 - 8.2 g/dL    Globulin 3.6 (H) 1.9 - 3.5 g/dL    A-G Ratio 1.1 g/dL   LIPASE    Collection Time: 05/14/25  4:03 PM   Result Value Ref Range    Lipase 36 11 - 82 U/L   ESTIMATED GFR    Collection Time: 05/14/25  4:03 PM   Result Value Ref Range    GFR (CKD-EPI) 48 (A) >60 mL/min/1.73 m 2   COD (ADULT)    Collection Time: 05/14/25  4:30 PM   Result Value Ref Range    ABO Grouping Only B     Rh Grouping Only POS     Antibody Screen-Cod NEG    APTT    Collection Time: 05/14/25  4:44 PM   Result Value Ref Range    APTT 27.0 24.7 - 36.0 sec   PROTHROMBIN TIME (INR)    Collection Time: 05/14/25  4:44 PM   Result Value Ref Range    PT 14.7 (H) 12.0 - 14.6 sec    INR 1.15 (H) 0.87 - 1.13    TROPONIN    Collection Time: 25  4:44 PM   Result Value Ref Range    Troponin T 9 6 - 19 ng/L   ABO Rh Confirm    Collection Time: 25  4:44 PM   Result Value Ref Range    ABO Rh Confirm B POS    EKG    Collection Time: 25  5:57 PM   Result Value Ref Range    Report       [facility] Emergency Dept.    Test Date:  2025  Pt Name:    ROSELIA MEYERS                 Department: ER  MRN:        [id number]                      Room:       [facility]  Gender:     Male                         Technician: [id number]  :        1965                   Requested By:ER TRIAGE PROTOCOL  Order #:    [id number]                    Reading MD: MAURICE GIBSON MD    Measurements  Intervals                                Axis  Rate:       142                          P:          0  NV:         0                            QRS:        1  QRSD:       100                          T:          36  QT:         317  QTc:        487    Interpretive Statements  Atrial fibrillation, Rate of 142, normal axis, no ST elevation  Abnormal R-wave progression, late transition  Borderline prolonged QT interval  No previous ECG available for comparison  Electronically Signed On 2025 17:57:10 PDT by MAURICE GIBSON MD     12 Lead EKG interpreted by me as shown above.    RADIOLOGY/PROCEDURES   The attending emergency physician has independently interpreted the diagnostic imaging associated with this visit and am waiting the final reading from the radiologist.   My preliminary interpretation is a follows: CTA no obvious aortic dissection or aneurysm.  There is a pleural effusion and consolidation in the right lung base  Radiologist interpretation:  CT-CTA COMPLETE THORACOABDOMINAL AORTA   Final Result      1.  No evidence for aortic aneurysm or dissection.   2.  Loculated right pleural effusion with associated compressive atelectasis and consolidation.   3.  Masslike area of consolidation in the left  posterior medial costophrenic angle. This may be due to infection or neoplasm. Clinical correlation and follow-up is needed.   4.  Hepatomegaly with fatty infiltration of the liver.                          COURSE & MEDICAL DECISION MAKING     ASSESSMENT, COURSE AND PLAN  Care Narrative:     Sepsis: Infection was suspected 5:19 PM (Time). Sepsis pathway was initiated. 30cc/kg bolus given. Antibiotics were given per protocol.    4:26 PM - A triage EKG was ordered by triage nursing staff as per protocol.     4:30 PM - I reviewed the patient's triage EKG which was concerning for atrial fibrillation and abnormal R-wave progressions. The patient was rapidly roomed in response to this.    4:38 PM - Patient seen and examined at bedside. Explained to the patient that his blood pressure and EKG are concerning for a possible aortic dissection. In response to this the patient was upgraded to a code aorta. Discussed plan of care, including imaging/labs to evaluate and fluids/medication for treatment. Patient agrees to the plan of care. The patient will be resuscitated with 1L LR Bolus via IV and medicated with Sublimaze 50 mcg injection and Zofran 4 mg injection. Ordered for CT-CTA Complete Thoracoabdominal Aorta, APTT, Prothrombin Time, Troponin, CBC w/Diff, CMP, Lipase, and UA to evaluate his symptoms. Differential diagnoses include but not limited to: Aortic dissection versus kidney stone versus sepsis versus pneumonia.      4:48 PM - Patient was reevaluated at bedside. The patient's blood pressure is 78/40 and he will receive a second LR Bolus via IV for treatment. The patient will also be medicated with Levophed 8 mg via IV.     4:55 PM - Patient was reevaluated at bedside. His blood pressure has improved to 94 systolic and he can now be taken to the CT scanner for imaging.     5:19 PM - The sepsis pathway was initiated at this time. Ordered Blood Culture X2, Urine Culture, and Lactic Acid to evaluate. The patient will be  medicated with Zithromax 500 mg PO and Unasyn 3 g via IV.     5:31 PM - Patient was reevaluated at bedside. Discussed radiology results with the patient which showed a loculated pleural effusion concerning for mass versus pneumonia. The patient will be resuscitated with additional LR Bolus via IV and medicated with Sublimaze 100 mcg and Cardizem 10 mg injection. Rickie hospitalist.     6:12 PM - Staff informed me that the patient's blood pressure has once again dropped. [place] intensivist for hospitalization at the [facility].     6:20 PM - I discussed the patient's case and the above findings with Dr. Gtz (Intensivist) who agrees to evaluate the patient for hospitalization at the [facility].    HYDRATION: Based on the patient's presentation of Hypotension the patient was given IV fluids. IV Hydration was used because oral hydration was not adequate alone. Upon recheck following hydration, the patient was improved.    CRITICAL CARE  The very real possibility of a deterioration of this patient's condition required the highest level of my preparedness for sudden, emergent intervention.  I provided critical care services, which included medication orders, frequent reevaluations of the patient's condition and response to treatment, ordering and reviewing test results, and discussing the case with various consultants.  The critical care time associated with the care of the patient was 30 minutes. Review chart for interventions. This time is exclusive of any other billable procedures.     PROBLEM LIST  Was seen and evaluated immediately at bedside where he was tachycardic and became hypotensive.  I was initially concerned given the flank pain and some slight chest discomfort that this may be aortic dissection therefore Code aorta ordered.  Patient was started on fluid bolus.  And I ordered some Levophed.  CTA did not show any aortic dissection or aneurysm.  It did show a pleural effusion as well as possible pneumonia in the  right lower lobe.  I think this is likely causing the patient's hypotension from early sepsis.  At this point time given the repetitive hypotension I think he would benefit from admission to the ICU.    Problem 2 atrial fibrillation patient presents with new onset atrial fibrillation his rate was only in the low 140s I do not suspect that this is actually causing the hypotension.  He was given IV fluid boluses but continued to be in atrial fibrillation therefore he was given a dose of diltiazem.    DISPOSITION AND DISCUSSIONS  I have discussed management of the patient with the following physicians and ROBERT's:  Dr. Gtz (Intensivist)    Discussion of management with other [facility] or appropriate source(s): None     Barriers to care at this time, including but not limited to: None.     Decision tools and prescription drugs considered including, but not limited to: Antibiotics Rocephin and azithromycin.    DISPOSITION:  Patient will be hospitalized by Dr. Gtz (Intensivist) in critical condition.    FINAL DIANGOSIS  1. Flank pain    2. Pleural effusion on right    3. Pneumonia of right lower lobe due to infectious organism    4. Atrial fibrillation with RVR (HCC)    5. Hypotension, unspecified hypotension type    5. Critical care time      Castro BATRES (Scribe), am scribing for, and in the presence of, OSIRIS Swift*.    Electronically signed by: Castro Strange (Scribe), 5/14/2025    Cem BATRES M.* personally performed the services described in this documentation, as scribed by Castro Strange in my presence, and it is both accurate and complete.     The note accurately reflects work and decisions made by me.  Cem Hutton M.D.  5/14/2025  7:22 PM

## 2025-05-15 ENCOUNTER — APPOINTMENT (OUTPATIENT)
Dept: CARDIOLOGY | Facility: MEDICAL CENTER | Age: 60
DRG: 871 | End: 2025-05-15
Payer: COMMERCIAL

## 2025-05-15 ENCOUNTER — HOSPITAL ENCOUNTER (OUTPATIENT)
Dept: RADIOLOGY | Facility: MEDICAL CENTER | Age: 60
End: 2025-05-15
Attending: STUDENT IN AN ORGANIZED HEALTH CARE EDUCATION/TRAINING PROGRAM

## 2025-05-15 PROBLEM — E87.6 HYPOKALEMIA: Status: RESOLVED | Noted: 2025-05-14 | Resolved: 2025-05-15

## 2025-05-15 PROBLEM — R57.9 SHOCK (HCC): Status: ACTIVE | Noted: 2025-05-15

## 2025-05-15 LAB
ALBUMIN SERPL BCP-MCNC: 2.9 G/DL (ref 3.2–4.9)
ALBUMIN/GLOB SERPL: 1 G/DL
ALP SERPL-CCNC: 65 U/L (ref 30–99)
ALT SERPL-CCNC: 14 U/L (ref 2–50)
ANION GAP SERPL CALC-SCNC: 9 MMOL/L (ref 7–16)
AST SERPL-CCNC: 16 U/L (ref 12–45)
BASOPHILS # BLD AUTO: 0.3 % (ref 0–1.8)
BASOPHILS # BLD: 0.03 K/UL (ref 0–0.12)
BILIRUB SERPL-MCNC: 0.9 MG/DL (ref 0.1–1.5)
BUN SERPL-MCNC: 24 MG/DL (ref 8–22)
CALCIUM ALBUM COR SERPL-MCNC: 9 MG/DL (ref 8.5–10.5)
CALCIUM SERPL-MCNC: 8.1 MG/DL (ref 8.5–10.5)
CHLORIDE SERPL-SCNC: 103 MMOL/L (ref 96–112)
CO2 SERPL-SCNC: 23 MMOL/L (ref 20–33)
CREAT SERPL-MCNC: 1 MG/DL (ref 0.5–1.4)
EOSINOPHIL # BLD AUTO: 0.08 K/UL (ref 0–0.51)
EOSINOPHIL NFR BLD: 0.7 % (ref 0–6.9)
ERYTHROCYTE [DISTWIDTH] IN BLOOD BY AUTOMATED COUNT: 40.5 FL (ref 35.9–50)
GFR SERPLBLD CREATININE-BSD FMLA CKD-EPI: 86 ML/MIN/1.73 M 2
GLOBULIN SER CALC-MCNC: 3 G/DL (ref 1.9–3.5)
GLUCOSE SERPL-MCNC: 109 MG/DL (ref 65–99)
HCT VFR BLD AUTO: 38.2 % (ref 42–52)
HGB BLD-MCNC: 12.4 G/DL (ref 14–18)
IMM GRANULOCYTES # BLD AUTO: 0.07 K/UL (ref 0–0.11)
IMM GRANULOCYTES NFR BLD AUTO: 0.6 % (ref 0–0.9)
LACTATE SERPL-SCNC: 1 MMOL/L (ref 0.5–2)
LACTATE SERPL-SCNC: 1.9 MMOL/L (ref 0.5–2)
LV EJECT FRACT  99904: 65
LV EJECT FRACT MOD 2C 99903: 64.69
LV EJECT FRACT MOD 4C 99902: 67.8
LV EJECT FRACT MOD BP 99901: 65.73
LYMPHOCYTES # BLD AUTO: 0.86 K/UL (ref 1–4.8)
LYMPHOCYTES NFR BLD: 7.8 % (ref 22–41)
MAGNESIUM SERPL-MCNC: 2.2 MG/DL (ref 1.5–2.5)
MCH RBC QN AUTO: 29.5 PG (ref 27–33)
MCHC RBC AUTO-ENTMCNC: 32.5 G/DL (ref 32.3–36.5)
MCV RBC AUTO: 90.7 FL (ref 81.4–97.8)
MONOCYTES # BLD AUTO: 0.7 K/UL (ref 0–0.85)
MONOCYTES NFR BLD AUTO: 6.3 % (ref 0–13.4)
NEUTROPHILS # BLD AUTO: 9.32 K/UL (ref 1.82–7.42)
NEUTROPHILS NFR BLD: 84.3 % (ref 44–72)
NRBC # BLD AUTO: 0 K/UL
NRBC BLD-RTO: 0 /100 WBC (ref 0–0.2)
PLATELET # BLD AUTO: 301 K/UL (ref 164–446)
PMV BLD AUTO: 9.5 FL (ref 9–12.9)
POTASSIUM SERPL-SCNC: 4 MMOL/L (ref 3.6–5.5)
PROCALCITONIN SERPL-MCNC: 3.03 NG/ML
PROT SERPL-MCNC: 5.9 G/DL (ref 6–8.2)
RBC # BLD AUTO: 4.21 M/UL (ref 4.7–6.1)
SCCMEC + MECA PNL NOSE NAA+PROBE: NEGATIVE
SODIUM SERPL-SCNC: 135 MMOL/L (ref 135–145)
TSH SERPL DL<=0.005 MIU/L-ACNC: 1.15 UIU/ML (ref 0.38–5.33)
WBC # BLD AUTO: 11.1 K/UL (ref 4.8–10.8)

## 2025-05-15 PROCEDURE — 99291 CRITICAL CARE FIRST HOUR: CPT | Performed by: INTERNAL MEDICINE

## 2025-05-15 PROCEDURE — 84443 ASSAY THYROID STIM HORMONE: CPT

## 2025-05-15 PROCEDURE — 93306 TTE W/DOPPLER COMPLETE: CPT

## 2025-05-15 PROCEDURE — 80053 COMPREHEN METABOLIC PANEL: CPT

## 2025-05-15 PROCEDURE — 700111 HCHG RX REV CODE 636 W/ 250 OVERRIDE (IP): Performed by: STUDENT IN AN ORGANIZED HEALTH CARE EDUCATION/TRAINING PROGRAM

## 2025-05-15 PROCEDURE — 700111 HCHG RX REV CODE 636 W/ 250 OVERRIDE (IP): Mod: JZ

## 2025-05-15 PROCEDURE — 85025 COMPLETE CBC W/AUTO DIFF WBC: CPT

## 2025-05-15 PROCEDURE — 700102 HCHG RX REV CODE 250 W/ 637 OVERRIDE(OP)

## 2025-05-15 PROCEDURE — 700105 HCHG RX REV CODE 258

## 2025-05-15 PROCEDURE — 84145 PROCALCITONIN (PCT): CPT

## 2025-05-15 PROCEDURE — 83735 ASSAY OF MAGNESIUM: CPT

## 2025-05-15 PROCEDURE — 83605 ASSAY OF LACTIC ACID: CPT

## 2025-05-15 PROCEDURE — 93306 TTE W/DOPPLER COMPLETE: CPT | Mod: 26 | Performed by: INTERNAL MEDICINE

## 2025-05-15 PROCEDURE — 770022 HCHG ROOM/CARE - ICU (200)

## 2025-05-15 PROCEDURE — 71045 X-RAY EXAM CHEST 1 VIEW: CPT

## 2025-05-15 PROCEDURE — A9270 NON-COVERED ITEM OR SERVICE: HCPCS

## 2025-05-15 PROCEDURE — 700111 HCHG RX REV CODE 636 W/ 250 OVERRIDE (IP): Performed by: INTERNAL MEDICINE

## 2025-05-15 RX ORDER — AZITHROMYCIN 250 MG/1
TABLET, FILM COATED ORAL
Status: COMPLETED
Start: 2025-05-15 | End: 2025-05-15

## 2025-05-15 RX ORDER — POTASSIUM CHLORIDE 7.45 MG/ML
10 INJECTION INTRAVENOUS ONCE
Status: COMPLETED | OUTPATIENT
Start: 2025-05-15 | End: 2025-05-15

## 2025-05-15 RX ORDER — HEPARIN SODIUM 5000 [USP'U]/ML
5000 INJECTION, SOLUTION INTRAVENOUS; SUBCUTANEOUS EVERY 8 HOURS
Status: DISCONTINUED | OUTPATIENT
Start: 2025-05-15 | End: 2025-05-16

## 2025-05-15 RX ADMIN — AMPICILLIN SODIUM, SULBACTAM SODIUM 3 G: 2; 1 INJECTION, POWDER, FOR SOLUTION INTRAMUSCULAR; INTRAVENOUS at 00:03

## 2025-05-15 RX ADMIN — AMPICILLIN SODIUM, SULBACTAM SODIUM 3 G: 2; 1 INJECTION, POWDER, FOR SOLUTION INTRAMUSCULAR; INTRAVENOUS at 05:12

## 2025-05-15 RX ADMIN — OXYCODONE 2.5 MG: 5 TABLET ORAL at 05:53

## 2025-05-15 RX ADMIN — ACETAMINOPHEN 1000 MG: 500 TABLET ORAL at 23:37

## 2025-05-15 RX ADMIN — AMPICILLIN SODIUM, SULBACTAM SODIUM 3 G: 2; 1 INJECTION, POWDER, FOR SOLUTION INTRAMUSCULAR; INTRAVENOUS at 17:37

## 2025-05-15 RX ADMIN — POTASSIUM CHLORIDE 10 MEQ: 7.46 INJECTION, SOLUTION INTRAVENOUS at 00:38

## 2025-05-15 RX ADMIN — AMPICILLIN SODIUM, SULBACTAM SODIUM 3 G: 2; 1 INJECTION, POWDER, FOR SOLUTION INTRAMUSCULAR; INTRAVENOUS at 12:56

## 2025-05-15 RX ADMIN — AMPICILLIN SODIUM, SULBACTAM SODIUM 3 G: 2; 1 INJECTION, POWDER, FOR SOLUTION INTRAMUSCULAR; INTRAVENOUS at 23:36

## 2025-05-15 RX ADMIN — OXYCODONE 2.5 MG: 5 TABLET ORAL at 09:33

## 2025-05-15 RX ADMIN — HEPARIN SODIUM 5000 UNITS: 5000 INJECTION, SOLUTION INTRAVENOUS; SUBCUTANEOUS at 17:35

## 2025-05-15 RX ADMIN — AZITHROMYCIN DIHYDRATE 500 MG: 250 TABLET ORAL at 05:24

## 2025-05-15 RX ADMIN — ACETAMINOPHEN 1000 MG: 500 TABLET ORAL at 12:53

## 2025-05-15 RX ADMIN — ACETAMINOPHEN 1000 MG: 500 TABLET ORAL at 19:11

## 2025-05-15 RX ADMIN — ACETAMINOPHEN 1000 MG: 500 TABLET ORAL at 05:13

## 2025-05-15 ASSESSMENT — PAIN DESCRIPTION - PAIN TYPE
TYPE: ACUTE PAIN

## 2025-05-15 ASSESSMENT — ENCOUNTER SYMPTOMS
CHILLS: 0
ABDOMINAL PAIN: 0
NAUSEA: 0
FEVER: 0
FLANK PAIN: 1
HEMOPTYSIS: 1
SHORTNESS OF BREATH: 1
COUGH: 1
VOMITING: 0

## 2025-05-15 NOTE — PROGRESS NOTES
[facility] Resident Progress Note      Admit Date: 5/14/2025    Resident(s): Melodie ODOM D.O.   Attending:  KIKI RAPP/ Dr. Terry    Patient ID:    Name:  Marlo Gusman   YOB: 1965  Age:  60 y.o.  male   MRN:  [id number]    Hospital Course (carried forward and updated):  Marlo Gusman is a 60 y.o. male with history of HTN, HLD, alcohol use (quit 3 weeks ago) who presented with 2 weeks of cough, fevers, lightheadedness, and generalized myalgias and few days of worsening right flank pain. Found to be in Afib with RVR, mildly hypoxic with loculated R pleural effusion and atelectasis as well as a LLL mass-like consolidation on CTA chest/abdomen. Admitted to ICU for Afib with RVR.    Consultants:  None     Interval Events:  Afib self-converted to SR prior to arrival on floor  Attempted thora, unable to aspirate fluid    N: awake and alert x4, coughing fits with R flank pain, scant hemoptysis since thora day prior  CV: SR/ST , MAP , brief vasoactives, TSH wnl  R: 3L NC, CXR without PTX  FEN/GI: regular diet, last BM PTA  /Renal: sCr 1   Heme: Hgb 12.4, plt 301  ID: WBC 11K, Tm 97.8, bcx 5/14 ngtd, MRSA(-), UA and culture sent  Skin/MSK:   Endo: within range    Bedside echo showing small R pleural effusion without floaters, cont abx, will discuss with IR    Family: updated daughter  Home medications reconciled     Vitals Range last 24h:  Temp:  [35.9 °C (96.6 °F)-36.6 °C (97.8 °F)] 36.4 °C (97.5 °F)  Pulse:  [] 76  Resp:  [17-41] 21  BP: ()/() 92/59  SpO2:  [84 %-100 %] 92 %      Intake/Output Summary (Last 24 hours) at 5/15/2025 0956  Last data filed at 5/15/2025 0600  Gross per 24 hour   Intake 3473.55 ml   Output --   Net 3473.55 ml        Review of Systems   Constitutional:  Negative for chills and fever.   Respiratory:  Positive for cough, hemoptysis and shortness of breath.    Cardiovascular:  Negative for chest pain.   Gastrointestinal:  Negative for  abdominal pain, nausea and vomiting.   Genitourinary:  Positive for flank pain (Right). Negative for dysuria.       PHYSICAL EXAM:  Vitals:    05/15/25 0545 05/15/25 0600 05/15/25 0615 05/15/25 0630   BP:  101/62 95/58 92/59   Pulse: 79 78 81 76   Resp: 19 (!) 21 (!) 22 (!) 21   Temp:       TempSrc:       SpO2: 93% 93% 91% 92%   Weight:       Height:        Body mass index is 32.65 kg/m².    O2 therapy: Pulse Oximetry: 92 %, O2 (LPM): 3, O2 Delivery Device: Silicone Nasal Cannula         Physical Exam  Vitals and nursing note reviewed.   HENT:      Head: Normocephalic.      Nose: Nose normal.      Mouth/Throat:      Mouth: Mucous membranes are moist.   Eyes:      Pupils: Pupils are equal, round, and reactive to light.   Cardiovascular:      Rate and Rhythm: Normal rate.      Pulses: Normal pulses.   Pulmonary:      Effort: Pulmonary effort is normal. No respiratory distress.      Breath sounds: Rales present. No wheezing.   Abdominal:      General: Abdomen is flat. There is no distension.      Palpations: Abdomen is soft.   Musculoskeletal:      Right lower leg: No edema.      Left lower leg: No edema.   Skin:     General: Skin is warm.      Capillary Refill: Capillary refill takes less than 2 seconds.   Neurological:      General: No focal deficit present.      Mental Status: He is alert and oriented to person, place, and time.             Recent Labs     05/14/25  1603 05/14/25  1644 05/15/25  0325   SODIUM 136  --  135   POTASSIUM 3.4*  --  4.0   CHLORIDE 99  --  103   CO2 20  --  23   BUN 28*  --  24*   CREATININE 1.63*  --  1.00   MAGNESIUM  --  2.2 2.2   CALCIUM 9.2  --  8.1*     Recent Labs     05/14/25  1603 05/15/25  0325   ALTSGPT 6 14   ASTSGOT 12 16   ALKPHOSPHAT 97 65   TBILIRUBIN 1.0 0.9   LIPASE 36  --    GLUCOSE 111* 109*     Recent Labs     05/14/25  1603 05/14/25  1644 05/15/25  0325   RBC 4.94  --  4.21*   HEMOGLOBIN 15.0  --  12.4*   HEMATOCRIT 44.2  --  38.2*   PLATELETCT 433  --  057    PROTHROMBTM  --  14.7*  --    APTT  --  27.0  --    INR  --  1.15*  --      Recent Labs     05/14/25  1603 05/15/25  0325   WBC 13.4* 11.1*   NEUTSPOLYS 81.80* 84.30*   LYMPHOCYTES 10.40* 7.80*   MONOCYTES 5.50 6.30   EOSINOPHILS 1.30 0.70   BASOPHILS 0.40 0.30   ASTSGOT 12 16   ALTSGPT 6 14   ALKPHOSPHAT 97 65   TBILIRUBIN 1.0 0.9       Meds:   NORepinephrine  0-1 mcg/kg/min (Ideal) Stopped (05/15/25 0557)    senna-docusate  2 Tablet      And    polyethylene glycol/lytes  1 Packet      ondansetron  4 mg      ondansetron  4 mg      promethazine  12.5-25 mg      acetaminophen  1,000 mg      Followed by    [START ON 5/19/2025] acetaminophen  1,000 mg      oxyCODONE immediate-release  2.5 mg      Or    oxyCODONE immediate-release  5 mg      Or    HYDROmorphone  0.25 mg      ampicillin-sulbactam (UNASYN) IV  3 g Stopped (05/15/25 0542)    azithromycin  500 mg      [Held by provider] heparin  5,000 Units          Procedures:  Thoracentesis 5/15 unsuccessful    Imaging:  EC-ECHOCARDIOGRAM COMPLETE W/O CONT   Final Result      DX-CHEST-PORTABLE (1 VIEW)   Final Result      1.  Hypoinflation.   2.  Moderate right pleural effusion and underlying atelectasis or consolidation. As previously mentioned, possible underlying pneumonia.   3.  Minimal left pleural effusion and subsegmental atelectatic change.      DX-CHEST-LIMITED (1 VIEW)   Final Result      Airspace opacity in the right lower lobe, concerning for pneumonia.      CT-CTA COMPLETE THORACOABDOMINAL AORTA   Final Result      1.  No evidence for aortic aneurysm or dissection.   2.  Loculated right pleural effusion with associated compressive atelectasis and consolidation.   3.  Masslike area of consolidation in the left posterior medial costophrenic angle. This may be due to infection or neoplasm. Clinical correlation and follow-up is needed.   4.  Hepatomegaly with fatty infiltration of the liver.                            ASSESSEMENT and PLAN:    * Atrial  fibrillation with RVR (HCC)- (present on admission)  Assessment & Plan  Self-converted prior to arrival on floor  Likely new in setting of sepsis, but can't completely rule out chronicity in the setting of alcohol use   CHADSVASC = 1 ( HTN)   Echo showing enlarged RA, nml LA, unable to estimate RVSP  TSH wnl  - Will hold on systemic AC for now given low CHADSVASC     Pleural effusion on right- (present on admission)  Assessment & Plan  Suspect parapneumonic effusion  Some loculations seen on both CT and bedside US  Attempted diagnostic thoracentesis 5/14 but unable to aspirate  Repeat bedside echo 5/14 without showed small pleural effusion without floaters  - Unasyn and azithromycin   - Will discuss with IR whether they would consider attempting thora  - Urine legionella and strep    Acute hypoxic respiratory failure (HCC)- (present on admission)  Assessment & Plan  2/2 underlying PNA/pleural effusion  NC as needed titrated to goal sats >90%     Mass of lower lobe of left lung- (present on admission)  Assessment & Plan  Incidental finding on admission CT  I am not very suspicious that this is primary lung Ca given location and no apparent risk factors   Will need outpatient follow-up imaging    High anion gap metabolic acidosis- (present on admission)  Assessment & Plan  Mild due to lactic acidosis, resolved    QUE (acute kidney injury) (HCC)- (present on admission)  Assessment & Plan  Likely prerenal in the setting of sepsis, resolved with fluid resuscitation  - UA pending  - Trend   - I/Os, avoiding nephrotoxins    Sepsis (HCC)- (present on admission)  Assessment & Plan  Sepsis criteria based on leukocytosis, tachycardia  Elevated lactic acid  Source is pulmonary  Received 2lt IVF in ER  - Continue assessing fluid needs   - Follow blood cultures  - Target MAP >65    Hypertension  Assessment & Plan  SBP soft in the setting of aFib   Hold home antihypertensives for now, reassess      VTE: resume heparin in afternoon  if hemoptysis resolved  Ulcer: n/a  Lines: pIVs  De-escalation of abx: cont Unasyn, finish elaine  Dispo: transfer to tele  Code status: full code    Melodie ODOM D.O. PGY-2

## 2025-05-15 NOTE — CARE PLAN
The patient is Watcher - Medium risk of patient condition declining or worsening    Shift Goals  Clinical Goals: Hemodynamic stability, Pain management  Patient Goals: Rest  Family Goals: Updates    Progress made toward(s) clinical / shift goals:    Problem: Knowledge Deficit - Standard  Goal: Patient and family/care givers will demonstrate understanding of plan of care, disease process/condition, diagnostic tests and medications  Outcome: Progressing     Problem: Pain - Standard  Goal: Alleviation of pain or a reduction in pain to the patient’s comfort goal  Outcome: Progressing     Problem: Hemodynamics  Goal: Patient's hemodynamics, fluid balance and neurologic status will be stable or improve  Outcome: Progressing       Patient is not progressing towards the following goals:

## 2025-05-15 NOTE — CARE PLAN
The patient is Stable - Low risk of patient condition declining or worsening    Shift Goals  Clinical Goals: hemodynamic stability, pain management  Patient Goals: pain mangement, rest  Family Goals: PERLITA    Progress made toward(s) clinical / shift goals:    Problem: Knowledge Deficit - Standard  Goal: Patient and family/care givers will demonstrate understanding of plan of care, disease process/condition, diagnostic tests and medications  Outcome: Progressing     Problem: Pain - Standard  Goal: Alleviation of pain or a reduction in pain to the patient’s comfort goal  Outcome: Progressing     Problem: Hemodynamics  Goal: Patient's hemodynamics, fluid balance and neurologic status will be stable or improve  Outcome: Progressing     Problem: Respiratory  Goal: Patient will achieve/maintain optimum respiratory ventilation and gas exchange  Outcome: Progressing     Problem: Urinary - Renal Perfusion  Goal: Ability to achieve and maintain adequate renal perfusion and functioning will improve  Outcome: Progressing     Problem: Urinary Elimination  Goal: Establish and maintain regular urinary output  Outcome: Progressing

## 2025-05-15 NOTE — PROGRESS NOTES
Assumed care of patient, report received from PAL Villalobos.  Patient is alert, resting in bed, oriented x4.  Reports discomfort at attempted thoracentesis site, no other c/o pain.  Discussed POC for the day, patient voiced understanding.  Safety precautions in place, no needs at this time.

## 2025-05-15 NOTE — ED NOTES
Pt transported via gurney with T625/00 RN. Pt connected to cardiac monitor and 3L of O2 via NC. Pt aox4. Respirations even and unlabored. Family at bedside. All belongings sent with pt for transport

## 2025-05-15 NOTE — ED NOTES
Patient remains comfortable on gurney, no identifiable needs at this time. Equal chest rise and fall bilaterally, pt connected to cardiac monitor. Safety measures in place, call light within reach. Family at bedside

## 2025-05-15 NOTE — ASSESSMENT & PLAN NOTE
Likely new in setting of sepsis, but can't completely rule out chronicity in the setting of alcohol use   CHADSVASC = 1 ( HTN)   Echo showing enlarged RA, nml LA, unable to estimate RVSP  TSH wnl  - Will hold on systemic AC for now given low CHADSVASC   - Start low-dose metoprolol for rate control

## 2025-05-15 NOTE — PROCEDURES
Thoracentesis    Date/Time: 5/14/2025 9:10 PM    Performed by: Ricardo Gtz M.D.  Authorized by: Ricardo Gtz M.D.    Consent:     Consent obtained:  Verbal    Consent given by:  Patient    Risks, benefits, and alternatives were discussed: yes      Risks discussed:  Bleeding, pneumothorax and infection  Universal protocol:     Patient identity confirmed:  Verbally with patient  Sedation:     Sedation type:  None  Anesthesia:     Anesthesia method:  Local infiltration    Local anesthetic:  Lidocaine 1% w/o epi  Procedure details:     Preparation: Patient was prepped and draped in usual sterile fashion      Patient position:  Sitting    Location:  R midscapular line    Intercostal space:  6th    Puncture method:  Over-the-needle catheter    Ultrasound guidance: yes      Indwelling catheter placed: no      Number of attempts:  3 or more    Drainage characteristics:  Cloudy and bloody  Post-procedure details:     Procedure completion:  Procedure terminated electively by provider  Comments:      I attempted a diagnostic and therapeutic thoracentesis. I was able to aspirate cloudy pleural fluid during administration of local anesthetic, but upon inserting over-the-needle catheter no pleural fluid was able to be aspirated. I inserted the over-the-needle catheter twice before aborting the procedure. I did aspirate blood on one attempt. Patient had an episode of hemoptysis with associated nosebleed during the procedure. Follow-up CXR showed no evidence of pneumothorax.

## 2025-05-15 NOTE — ED NOTES
Med Rec complete per patient and pharmacy   Allergies reviewed  Antibiotics in the past 30 days:no  Anticoagulant in past 14 days:no  Pharmacy patient utilizes:[facility]    Verified medications and strengths with pharmacy

## 2025-05-15 NOTE — PROGRESS NOTES
4 Eyes Skin Assessment Completed by PAL Villalobos and PAL Hasasn.    Head WDL  Ears WDL  Nose WDL  Mouth WDL  Neck WDL  Breast/Chest WDL  Shoulder Blades WDL  Spine WDL  (R) Arm/Elbow/Hand Redness and Blanching  (L) Arm/Elbow/Hand Redness and Blanching  Abdomen WDL  Groin WDL  Scrotum/Coccyx/Buttocks Redness and Blanching  (R) Leg Scar  (L) Leg WDL  (R) Heel/Foot/Toe WDL  (L) Heel/Foot/Toe WDL          Devices In Places ECG, Blood Pressure Cuff, Pulse Ox, and Nasal Cannula      Interventions In Place NC W/Ear Foams, TAP System, Pillows, and Pressure Redistribution Mattress    Possible Skin Injury No    Pictures Uploaded Into Epic N/A  Wound Consult Placed N/A  RN Wound Prevention Protocol Ordered Yes

## 2025-05-15 NOTE — ED NOTES
Pt slightly hypotensive. SALONI Hutton notified. Per M.XAVIER. ok to start levophed to keep MAP > 60

## 2025-05-15 NOTE — ASSESSMENT & PLAN NOTE
Suspect parapneumonic effusion  Some loculations seen on both CT and bedside US  Attempted diagnostic thoracentesis 5/14 but unable to aspirate  Repeat bedside US 05/15/25 with small pleural effusion not amenable to drainage  Repeat bedside US 05/16/25 with somewhat larger pocket  - Will discuss with IR about possible diagnostic thora  - Finished azithromycin, cont Unasyn   - Urine legionella and strep

## 2025-05-15 NOTE — ASSESSMENT & PLAN NOTE
Prerenal, improving    Strict I/Os  Renally dosed medications  Avoid nephrotoxic agents as able  Trend

## 2025-05-15 NOTE — H&P
[facility] History & Physical Note    Date of Service  5/14/2025      Attending: Ricardo Gtz M.d.  Senior Resident: Dr. Art  Contact Number: [phone]    Primary Care Physician  ZOHAIB Vaughan    Code Status  Full Code    Chief Complaint  Chief Complaint   Patient presents with    Flank Pain     Right sided flank pain x 5 days, pt denies dysuria but reports pain is darker than normal. +nausea.        History of Presenting Illness (HPI):   Marlo Gusman is a 60 y.o. male with medical history of hypertension, hyperlipidemia, who presented 5/14/2025 with severe R sided chest pain upon coughing/breathing, fever, chills, diaphoresis, lightheadedness and generalized muscle aches ongoing for about a week, had been experiencing cold-like symptoms for 2 weeks. No recent travels, no recent hospitalizations.     In ER, he was found in aFIb RVR in the 140s, -120s, afebrile and mildly hypoxic on 1-2lt. He has mild leukocytosis, K 3.4 SCR 1.63 (unclear baseline), mild AGMA and lactic 2.4. CTA chest/abdomen revealed a loculated R pleural effusion with adjacent atelectasis, and an area of consolidation/mass-like in the L costophrenic angle, no evidence of aortic aneurism and a fatty liver. He will be admitted to the ICU for further workup and management.     I discussed the plan of care with patient and family.    Review of Systems  Review of Systems   Constitutional:  Positive for chills, diaphoresis, fever and malaise/fatigue.   HENT:  Positive for congestion. Negative for sore throat.    Eyes:  Negative for blurred vision.   Respiratory:  Positive for cough and shortness of breath. Negative for hemoptysis, sputum production and wheezing.    Cardiovascular:  Positive for chest pain (R side). Negative for orthopnea and leg swelling.   Gastrointestinal:  Positive for nausea. Negative for abdominal pain, blood in stool, diarrhea and vomiting.   Genitourinary:  Negative for dysuria.    Musculoskeletal:  Positive for myalgias.   Skin:  Negative for itching and rash.   Neurological:  Positive for dizziness.     Past Medical History   has no past medical history on file.    Surgical History   has no past surgical history on file.     Family History  family history is not on file.   Family history reviewed with patient.     Social History  Tobacco: Denies  Alcohol: 4-5 beers a day, LD a week ago   Recreational drugs (illegal or prescription): Denies  Lives with wife, 2 daughters here.     Allergies  Allergies[1]    Medications  Prior to Admission Medications   Prescriptions Last Dose Informant Patient Reported? Taking?   amLODIPine (NORVASC) 10 MG Tab 5/14/2025 Morning Patient, Patient's Home Pharmacy Yes Yes   Sig: Take 10 mg by mouth every day.   aspirin (ASA) 325 MG Tab 5/14/2025 Morning Patient Yes Yes   Sig: Take 325-650 mg by mouth 1 time a day as needed for Mild Pain.   benzonatate (TESSALON) 100 MG Cap 5/14/2025 Morning Patient, Patient's Home Pharmacy Yes Yes   Sig: Take 100 mg by mouth 3 times a day as needed for Cough. X 10 days   cyclobenzaprine (FLEXERIL) 10 MG Tab 5/14/2025 Morning Patient, Patient's Home Pharmacy Yes Yes   Sig: Take 10 mg by mouth 1 time a day as needed for Muscle Spasms. X 14 days   hydroCHLOROthiazide 25 MG Tab 5/14/2025 Morning Patient, Patient's Home Pharmacy Yes Yes   Sig: Take 25 mg by mouth every day.   ibuprofen (MOTRIN) 200 MG Tab 5/14/2025 Morning Patient Yes Yes   Sig: Take 800 mg by mouth one time as needed for Mild Pain. 800 mg = 4 tabs   losartan (COZAAR) 100 MG Tab 5/14/2025 Morning Patient, Patient's Home Pharmacy Yes Yes   Sig: Take 100 mg by mouth every day.      Facility-Administered Medications: None       Physical Exam  Temp:  [36.6 °C (97.8 °F)] 36.6 °C (97.8 °F)  Pulse:  [] (P) 140  Resp:  [19-26] (P) 20  BP: ()/() (P) 96/56  SpO2:  [84 %-100 %] (P) 94 %  Blood Pressure: 91/65   Temperature: 36.6 °C (97.8 °F)   Pulse: (!) 132  "  Respiration: (!) 22   Pulse Oximetry: 94 %     Physical Exam  Vitals and nursing note reviewed.   Constitutional:       General: He is in acute distress.      Appearance: He is ill-appearing and diaphoretic.   HENT:      Head: Normocephalic and atraumatic.      Mouth/Throat:      Mouth: Mucous membranes are dry.   Eyes:      Extraocular Movements: Extraocular movements intact.      Conjunctiva/sclera: Conjunctivae normal.      Pupils: Pupils are equal, round, and reactive to light.   Cardiovascular:      Rate and Rhythm: Tachycardia present. Rhythm irregular.      Heart sounds: Normal heart sounds.   Pulmonary:      Effort: Respiratory distress (Due to severe pain) present.      Breath sounds: Rales (Some in bases) present.   Abdominal:      General: Abdomen is flat. Bowel sounds are normal.      Palpations: Abdomen is soft.      Tenderness: There is no abdominal tenderness.   Musculoskeletal:         General: No swelling. Normal range of motion.      Cervical back: Normal range of motion.   Skin:     General: Skin is warm.      Capillary Refill: Capillary refill takes less than 2 seconds.      Findings: No rash.   Neurological:      General: No focal deficit present.      Mental Status: He is alert and oriented to person, place, and time. Mental status is at baseline.   Psychiatric:         Mood and Affect: Mood normal.         Laboratory:  Recent Labs     05/14/25  1603   WBC 13.4*   RBC 4.94   HEMOGLOBIN 15.0   HEMATOCRIT 44.2   MCV 89.5   MCH 30.4   MCHC 33.9   RDW 40.4   PLATELETCT 433   MPV 9.5     Recent Labs     05/14/25  1603   SODIUM 136   POTASSIUM 3.4*   CHLORIDE 99   CO2 20   GLUCOSE 111*   BUN 28*   CREATININE 1.63*   CALCIUM 9.2     Recent Labs     05/14/25  1603   ALTSGPT 6   ASTSGOT 12   ALKPHOSPHAT 97   TBILIRUBIN 1.0   LIPASE 36   GLUCOSE 111*     Recent Labs     05/14/25  1644   APTT 27.0   INR 1.15*     No results for input(s): \"NTPROBNP\" in the last 72 hours.      Recent Labs     " 05/14/25  1644   TROPONINT 9       Imaging:  CT-CTA COMPLETE THORACOABDOMINAL AORTA   Final Result      1.  No evidence for aortic aneurysm or dissection.   2.  Loculated right pleural effusion with associated compressive atelectasis and consolidation.   3.  Masslike area of consolidation in the left posterior medial costophrenic angle. This may be due to infection or neoplasm. Clinical correlation and follow-up is needed.   4.  Hepatomegaly with fatty infiltration of the liver.                        EC-ECHOCARDIOGRAM COMPLETE W/ CONT    (Results Pending)     Assessment/Plan:  I anticipate this patient will require at least two midnights for appropriate medical management, necessitating inpatient admission because aFib RVR, peural effusion    Patient will need a ICU (Adult and Pediatrics) bed on MEDICAL service .  The need is secondary to aFib RVR, peural effusion.    * Atrial fibrillation with RVR (HCC)  Assessment & Plan  Likely new in setting of sepsis, but can't completely rule out chronicity in the setting of alcohol use   CHADSVASC = 1 ( HTN)   - S/p IVF per sepsis protocol, focus on pain  - Will consider amio if rate control needed   - Formal echo, TSH  - Will hold on systemic AC for now given low CHADSVASC     Acute hypoxic respiratory failure (HCC)  Assessment & Plan  2/2 underlying PNA/pleural effusion  NC as needed titrated to goal sats >90%     QUE (acute kidney injury) (HCC)  Assessment & Plan  Likely prerenal in the setting of sepsis   - UA pending  - Fluid resuscitated  - Trend   - I/Os, avoiding nephrotoxins    Pleural effusion on right  Assessment & Plan  Suspect parapneumonic effusion  Some loculations seen on both CT and bedside US  - Will do thoracentesis +/- chest tube placement w/ fluid studies and gram/culture  - Unasyn and azithromycin   - MRSA PCR  - Urine legionella and strep    Sepsis (HCC)- (present on admission)  Assessment & Plan  Sepsis criteria based on leukocytosis,  tachycardia  Elevated lactic acid  Source is pulmonary  Received 2lt IVF in ER  - Continue assessing fluid needs   - Follow blood cultures and plan on pleural fluid cultures  - Started on unasyn and azithromycin as above   - Trend lactic   - See plan for pleural effusion    Mass of lower lobe of left lung  Assessment & Plan  Incidental finding on admission CT  I am not very suspicious that this is primary lung Ca given location and no apparent risk factors   Will need outpatient follow-up imaging    Hypokalemia  Assessment & Plan  Mild, repleting goal >4    Hypertension  Assessment & Plan  SBP soft in the setting of aFib   Hold home antihypertensives for now, reassess    High anion gap metabolic acidosis  Assessment & Plan  Mild, lactic acidosis  - IVF, reassess   - Trend lactic      Quality Measures:  Feeding: Diet after procedure  Analgesia: PRN tylenol, opiates  Sedation: N/A  Thromboprophylaxis: Start tomorrow  Head of bed: >30 degrees  Ulcer prophylaxis: N/A  Glycemic control: check A1c, FBG  Bowel care: bowel regimen PRN  Indwelling lines: PIVs  Deescalation of antibiotics: elaine Cope PGY-3  Internal Medicine [facility]         [1] No Known Allergies

## 2025-05-15 NOTE — PROGRESS NOTES
Critical Care Progress Note    Date of admission  5/14/2025     60 y.o. male with medical history of hypertension, hyperlipidemia, who presented 5/14/2025 with severe R sided chest pain upon coughing/breathing, fever, chills, diaphoresis, lightheadedness and generalized muscle aches ongoing for about a week, had been experiencing cold-like symptoms for 2 weeks. No recent travels, no recent hospitalizations.      In ER, he was found in aFIb RVR in the 140s, -120s, afebrile and mildly hypoxic on 1-2lt. He has mild leukocytosis, K 3.4 SCR 1.63 (unclear baseline), mild AGMA and lactic 2.4. CTA chest/abdomen revealed a loculated R pleural effusion with adjacent atelectasis, and an area of consolidation/mass-like in the L costophrenic angle, no evidence of aortic aneurism and a fatty liver. He will be admitted to the ICU for further workup and management.     Hospital Course  5/14 admitted to the ICU; thora unsuccessful  5/15 requiring levophed overnight     Interval Problem Update  Reviewed last 24 hour events:  Awake and alert  NC oxygen  Unasyn + azithro  VTE ppx held for hemoptysis   Repeat POCUS demonstrates a small pleural effusion not amenable to thora, will re-eval at a later time  Oxy for cough/pain  Levo on/off for hypotension    Review of Systems  Review of Systems   Unable to perform ROS: Critical illness        Vital Signs for last 24 hours   Temp:  [35.9 °C (96.6 °F)-36.6 °C (97.8 °F)] 36.4 °C (97.5 °F)  Pulse:  [] 76  Resp:  [17-41] 21  BP: ()/() 92/59  SpO2:  [84 %-100 %] 92 %    Hemodynamic parameters for last 24 hours       Respiratory Information for the last 24 hours       Physical Exam   Physical Exam  Vitals and nursing note reviewed.   HENT:      Head: Normocephalic.      Nose: Nose normal.      Mouth/Throat:      Mouth: Mucous membranes are moist.   Cardiovascular:      Rate and Rhythm: Normal rate.      Pulses: Normal pulses.   Pulmonary:      Effort: Pulmonary effort is  Lois from [facility] is looking for status of FMLA & Disability paperwork for patient. Please call. normal. No respiratory distress.   Abdominal:      General: Abdomen is flat. There is no distension.      Tenderness: There is no abdominal tenderness.   Musculoskeletal:      Right lower leg: No edema.      Left lower leg: No edema.   Skin:     General: Skin is warm and dry.      Capillary Refill: Capillary refill takes less than 2 seconds.   Neurological:      General: No focal deficit present.      Mental Status: He is alert and oriented to person, place, and time.      Motor: No weakness.   Psychiatric:         Mood and Affect: Mood normal.         Medications  Current Medications[1]    Fluids    Intake/Output Summary (Last 24 hours) at 5/15/2025 1026  Last data filed at 5/15/2025 0600  Gross per 24 hour   Intake 3473.55 ml   Output --   Net 3473.55 ml       Laboratory          Recent Labs     05/14/25  1603 05/14/25  1644 05/15/25  0325   SODIUM 136  --  135   POTASSIUM 3.4*  --  4.0   CHLORIDE 99  --  103   CO2 20  --  23   BUN 28*  --  24*   CREATININE 1.63*  --  1.00   MAGNESIUM  --  2.2 2.2   CALCIUM 9.2  --  8.1*     Recent Labs     05/14/25  1603 05/15/25  0325   ALTSGPT 6 14   ASTSGOT 12 16   ALKPHOSPHAT 97 65   TBILIRUBIN 1.0 0.9   LIPASE 36  --    GLUCOSE 111* 109*     Recent Labs     05/14/25  1603 05/15/25  0325   WBC 13.4* 11.1*   NEUTSPOLYS 81.80* 84.30*   LYMPHOCYTES 10.40* 7.80*   MONOCYTES 5.50 6.30   EOSINOPHILS 1.30 0.70   BASOPHILS 0.40 0.30   ASTSGOT 12 16   ALTSGPT 6 14   ALKPHOSPHAT 97 65   TBILIRUBIN 1.0 0.9     Recent Labs     05/14/25  1603 05/14/25  1644 05/15/25  0325   RBC 4.94  --  4.21*   HEMOGLOBIN 15.0  --  12.4*   HEMATOCRIT 44.2  --  38.2*   PLATELETCT 433  --  301   PROTHROMBTM  --  14.7*  --    APTT  --  27.0  --    INR  --  1.15*  --        Imaging  X-Ray:  I have personally reviewed the images and compared with prior images.    Assessment/Plan  * Atrial fibrillation with RVR (HCC)- (present on admission)  Assessment & Plan  Self-converted prior to arrival on floor  Likely  new in setting of sepsis, but can't completely rule out chronicity in the setting of alcohol use   CHADSVASC = 1 ( HTN)   Echo showing enlarged RA, nml LA, unable to estimate RVSP  TSH wnl  - Will hold on systemic AC for now given low CHADSVASC     Shock (HCC)  Assessment & Plan  Septic shock, present upon admission to ICU    Empiric CAP coverage   Vasopressors for MAP > 65  Follow cultures  Maintain euvolemia     Hypertension  Assessment & Plan  Reintroduce oral antihypertensives when appropriate      Acute hypoxic respiratory failure (HCC)- (present on admission)  Assessment & Plan  2/2 underlying PNA/pleural effusion    SpO2 > 90%  Pulmonary hygiene  Empiric CAP coverage    Mass of lower lobe of left lung- (present on admission)  Assessment & Plan  Incidental finding on admission CT    Will need outpatient follow-up imaging    High anion gap metabolic acidosis- (present on admission)  Assessment & Plan  Mild due to lactic acidosis, resolved    QUE (acute kidney injury) ([facility])- (present on admission)  Assessment & Plan  Ischemic ATN    Strict I/Os  Renally dosed medications  Avoid nephrotoxic agents as able  Trend     Pleural effusion on right- (present on admission)  Assessment & Plan  Suspect parapneumonic effusion  Some loculations seen on both CT and bedside US  Attempted diagnostic thoracentesis 5/14 but unable to aspirate  Repeat bedside US 05/15/25 with small pleural effusion not amenable to drainage  - Unasyn and azithromycin   - Urine legionella and strep    Sepsis ([facility])- (present on admission)  Assessment & Plan  Sepsis criteria based on leukocytosis, tachycardia  Elevated lactic acid  Source is pulmonary  Received 2lt IVF in ER  - Continue assessing fluid needs   - Follow blood cultures  - Target MAP >65         VTE:  Heparin  Ulcer: Not Indicated  Lines: None    I have performed a physical exam and reviewed and updated ROS and Plan today (5/15/2025). In review of yesterday's note (5/14/2025), there are  no changes except as documented above.     Discussed patient condition and risk of morbidity and/or mortality with RN, RT, Pharmacy, Charge nurse / hot rounds, and Patient  The patient remains critically ill.  Critical care time = 38 minutes in directly providing and coordinating critical care and extensive data review.  No time overlap and excludes procedures.         [1]   Current Facility-Administered Medications   Medication Dose Route Frequency Provider Last Rate Last Admin    [Held by provider] heparin injection 5,000 Units  5,000 Units Subcutaneous Q8HRS Nigel Terry M.D.        norepinephrine (Levophed) 8 mg in 250 mL NS infusion (premix)  0-1 mcg/kg/min (Ideal) Intravenous Continuous Cem Hutton M.D.   Stopped at 05/15/25 0557    senna-docusate (Pericolace Or Senokot S) 8.6-50 MG per tablet 2 Tablet  2 Tablet Oral Q EVENING Radha Art M.D.        And    polyethylene glycol/lytes (Miralax) Packet 1 Packet  1 Packet Oral QDAY PRN Radha Art M.D.        ondansetron (Zofran) syringe/vial injection 4 mg  4 mg Intravenous Q4HRS PRN Radha Art M.D.        ondansetron (Zofran ODT) dispertab 4 mg  4 mg Oral Q4HRS PRBHUMIKA Art M.D.        promethazine (Phenergan) tablet 12.5-25 mg  12.5-25 mg Oral Q4HRS PRBHUMIKA Art M.D.        acetaminophen (Tylenol) tablet 1,000 mg  1,000 mg Oral Q6HRS Radha Art M.D.   1,000 mg at 05/15/25 0513    Followed by    [START ON 5/19/2025] acetaminophen (Tylenol) tablet 1,000 mg  1,000 mg Oral Q6HRS PRBHUMIKA Art M.D.        oxyCODONE immediate-release (Roxicodone) tablet 2.5 mg  2.5 mg Oral Q3HRS STUART Art M.D.   2.5 mg at 05/15/25 0933    Or    oxyCODONE immediate-release (Roxicodone) tablet 5 mg  5 mg Oral Q3HRS PRBHUMIKA Art M.D.        Or    HYDROmorphone (Dilaudid) injection 0.25 mg  0.25 mg Intravenous Q3HRS PRN Radha Art M.D.        ampicillin/sulbactam (Unasyn) 3 g in  mL IVPB  3 g Intravenous  Q6HRS Radha Art M.D.   Stopped at 05/15/25 0542    azithromycin (Zithromax) tablet 500 mg  500 mg Oral DAILY Radha Art M.D.   500 mg at 05/15/25 0524

## 2025-05-15 NOTE — ASSESSMENT & PLAN NOTE
Sepsis criteria based on leukocytosis, tachycardia  Elevated lactic acid  Source is pulmonary  Received 2lt IVF in ER  Empiric CAP coverage  - Continue assessing fluid needs   - Follow blood cultures  - Target MAP >65

## 2025-05-15 NOTE — PROGRESS NOTES
Thoracentesis performed    Time out performed @ 2009  Lidocaine administered @ 2013  Needle inserted @ 2017  Multiple attempts to aspirate fluid, MD unable to. Pt observed to have hemoptysis

## 2025-05-15 NOTE — ED NOTES
Pt's wife ambulated to the  complaining that her  is now having chest pain. This tech then went over to the pt who presented ashen, with new onset chest pain. This tech then got a wheelchair and brought the pt to the EKG room. EKG order was placed and completed on pt and presented to ANIL Gironegdelisa. Pt's wife at bedside to chaperone.   Pt brought back to room Red 6 via wheelchair. C/C now Right Sided Flank Pain and New Onset Chest Pain. Pt placed in a gown, on the monitor and call light within reach. ANIL Sonderregger at bedside. Sonderregger called code Aorta on this pt, charge RN notified.

## 2025-05-16 ENCOUNTER — APPOINTMENT (OUTPATIENT)
Dept: RADIOLOGY | Facility: MEDICAL CENTER | Age: 60
DRG: 871 | End: 2025-05-16
Attending: INTERNAL MEDICINE
Payer: COMMERCIAL

## 2025-05-16 LAB
ALBUMIN SERPL BCP-MCNC: 2.6 G/DL (ref 3.2–4.9)
ALBUMIN/GLOB SERPL: 0.7 G/DL
ALP SERPL-CCNC: 209 U/L (ref 30–99)
ALT SERPL-CCNC: 17 U/L (ref 2–50)
AMYLASE FLD-CCNC: 18 U/L
ANION GAP SERPL CALC-SCNC: 10 MMOL/L (ref 7–16)
APPEARANCE FLD: NORMAL
AST SERPL-CCNC: 36 U/L (ref 12–45)
BASOPHILS # BLD AUTO: 0.5 % (ref 0–1.8)
BASOPHILS # BLD: 0.05 K/UL (ref 0–0.12)
BILIRUB SERPL-MCNC: 0.6 MG/DL (ref 0.1–1.5)
BODY FLD TYPE: NORMAL
BUN SERPL-MCNC: 15 MG/DL (ref 8–22)
CALCIUM ALBUM COR SERPL-MCNC: 9.7 MG/DL (ref 8.5–10.5)
CALCIUM SERPL-MCNC: 8.6 MG/DL (ref 8.5–10.5)
CHLORIDE SERPL-SCNC: 101 MMOL/L (ref 96–112)
CO2 SERPL-SCNC: 21 MMOL/L (ref 20–33)
COLOR FLD: YELLOW
CREAT SERPL-MCNC: 0.82 MG/DL (ref 0.5–1.4)
CYTOLOGY REG CYTOL: NORMAL
EOSINOPHIL # BLD AUTO: 0.22 K/UL (ref 0–0.51)
EOSINOPHIL NFR BLD: 2.1 % (ref 0–6.9)
ERYTHROCYTE [DISTWIDTH] IN BLOOD BY AUTOMATED COUNT: 42.8 FL (ref 35.9–50)
GFR SERPLBLD CREATININE-BSD FMLA CKD-EPI: 100 ML/MIN/1.73 M 2
GLOBULIN SER CALC-MCNC: 3.7 G/DL (ref 1.9–3.5)
GLUCOSE FLD-MCNC: 12 MG/DL
GLUCOSE SERPL-MCNC: 90 MG/DL (ref 65–99)
GRAM STN SPEC: NORMAL
HCT VFR BLD AUTO: 45 % (ref 42–52)
HGB BLD-MCNC: 14.2 G/DL (ref 14–18)
IMM GRANULOCYTES # BLD AUTO: 0.05 K/UL (ref 0–0.11)
IMM GRANULOCYTES NFR BLD AUTO: 0.5 % (ref 0–0.9)
LDH FLD L TO P-CCNC: 1803 U/L
LYMPHOCYTES # BLD AUTO: 0.99 K/UL (ref 1–4.8)
LYMPHOCYTES NFR BLD: 9.6 % (ref 22–41)
MAGNESIUM SERPL-MCNC: 2 MG/DL (ref 1.5–2.5)
MCH RBC QN AUTO: 29.7 PG (ref 27–33)
MCHC RBC AUTO-ENTMCNC: 31.6 G/DL (ref 32.3–36.5)
MCV RBC AUTO: 94.1 FL (ref 81.4–97.8)
MONOCYTES # BLD AUTO: 1.03 K/UL (ref 0–0.85)
MONOCYTES NFR BLD AUTO: 10 % (ref 0–13.4)
MONOS+MACROS NFR FLD MANUAL: 5 %
NEUTROPHILS # BLD AUTO: 7.98 K/UL (ref 1.82–7.42)
NEUTROPHILS NFR BLD: 77.3 % (ref 44–72)
NEUTROPHILS NFR FLD: 95 %
NRBC # BLD AUTO: 0 K/UL
NRBC BLD-RTO: 0 /100 WBC (ref 0–0.2)
NUC CELL # FLD: 7482 CELLS/UL
PH FLD: 8 [PH]
PLATELET # BLD AUTO: 280 K/UL (ref 164–446)
PMV BLD AUTO: 9.4 FL (ref 9–12.9)
POTASSIUM SERPL-SCNC: 4.5 MMOL/L (ref 3.6–5.5)
PROT FLD-MCNC: 3.9 G/DL
PROT SERPL-MCNC: 6.3 G/DL (ref 6–8.2)
RBC # BLD AUTO: 4.78 M/UL (ref 4.7–6.1)
RBC # FLD: 9000 CELLS/UL
SIGNIFICANT IND 70042: NORMAL
SITE SITE: NORMAL
SODIUM SERPL-SCNC: 132 MMOL/L (ref 135–145)
SOURCE SOURCE: NORMAL
WBC # BLD AUTO: 10.3 K/UL (ref 4.8–10.8)

## 2025-05-16 PROCEDURE — 700111 HCHG RX REV CODE 636 W/ 250 OVERRIDE (IP): Mod: JZ | Performed by: INTERNAL MEDICINE

## 2025-05-16 PROCEDURE — 87116 MYCOBACTERIA CULTURE: CPT

## 2025-05-16 PROCEDURE — 87070 CULTURE OTHR SPECIMN AEROBIC: CPT

## 2025-05-16 PROCEDURE — 700105 HCHG RX REV CODE 258: Performed by: INTERNAL MEDICINE

## 2025-05-16 PROCEDURE — 99223 1ST HOSP IP/OBS HIGH 75: CPT | Performed by: HOSPITALIST

## 2025-05-16 PROCEDURE — A9270 NON-COVERED ITEM OR SERVICE: HCPCS

## 2025-05-16 PROCEDURE — 84157 ASSAY OF PROTEIN OTHER: CPT

## 2025-05-16 PROCEDURE — 700111 HCHG RX REV CODE 636 W/ 250 OVERRIDE (IP): Mod: JZ

## 2025-05-16 PROCEDURE — 85025 COMPLETE CBC W/AUTO DIFF WBC: CPT

## 2025-05-16 PROCEDURE — 71045 X-RAY EXAM CHEST 1 VIEW: CPT

## 2025-05-16 PROCEDURE — 88112 CYTOPATH CELL ENHANCE TECH: CPT | Performed by: PATHOLOGY

## 2025-05-16 PROCEDURE — 0W993ZZ DRAINAGE OF RIGHT PLEURAL CAVITY, PERCUTANEOUS APPROACH: ICD-10-PCS

## 2025-05-16 PROCEDURE — 87205 SMEAR GRAM STAIN: CPT

## 2025-05-16 PROCEDURE — 83986 ASSAY PH BODY FLUID NOS: CPT

## 2025-05-16 PROCEDURE — 88112 CYTOPATH CELL ENHANCE TECH: CPT | Mod: 26 | Performed by: PATHOLOGY

## 2025-05-16 PROCEDURE — 83615 LACTATE (LD) (LDH) ENZYME: CPT

## 2025-05-16 PROCEDURE — 700105 HCHG RX REV CODE 258

## 2025-05-16 PROCEDURE — 87102 FUNGUS ISOLATION CULTURE: CPT

## 2025-05-16 PROCEDURE — 80053 COMPREHEN METABOLIC PANEL: CPT

## 2025-05-16 PROCEDURE — 700102 HCHG RX REV CODE 250 W/ 637 OVERRIDE(OP): Performed by: INTERNAL MEDICINE

## 2025-05-16 PROCEDURE — 83735 ASSAY OF MAGNESIUM: CPT

## 2025-05-16 PROCEDURE — 82150 ASSAY OF AMYLASE: CPT

## 2025-05-16 PROCEDURE — 88305 TISSUE EXAM BY PATHOLOGIST: CPT | Mod: 26 | Performed by: PATHOLOGY

## 2025-05-16 PROCEDURE — 89051 BODY FLUID CELL COUNT: CPT

## 2025-05-16 PROCEDURE — A9270 NON-COVERED ITEM OR SERVICE: HCPCS | Performed by: INTERNAL MEDICINE

## 2025-05-16 PROCEDURE — 700102 HCHG RX REV CODE 250 W/ 637 OVERRIDE(OP)

## 2025-05-16 PROCEDURE — 87015 SPECIMEN INFECT AGNT CONCNTJ: CPT

## 2025-05-16 PROCEDURE — 770020 HCHG ROOM/CARE - TELE (206)

## 2025-05-16 PROCEDURE — C1729 CATH, DRAINAGE: HCPCS

## 2025-05-16 PROCEDURE — 82945 GLUCOSE OTHER FLUID: CPT

## 2025-05-16 PROCEDURE — 99233 SBSQ HOSP IP/OBS HIGH 50: CPT | Mod: GC | Performed by: INTERNAL MEDICINE

## 2025-05-16 PROCEDURE — 88305 TISSUE EXAM BY PATHOLOGIST: CPT | Performed by: PATHOLOGY

## 2025-05-16 RX ORDER — METOPROLOL TARTRATE 25 MG/1
12.5 TABLET, FILM COATED ORAL 2 TIMES DAILY
Status: DISCONTINUED | OUTPATIENT
Start: 2025-05-16 | End: 2025-05-17

## 2025-05-16 RX ORDER — ENOXAPARIN SODIUM 100 MG/ML
40 INJECTION SUBCUTANEOUS DAILY
Status: DISCONTINUED | OUTPATIENT
Start: 2025-05-16 | End: 2025-05-27 | Stop reason: HOSPADM

## 2025-05-16 RX ORDER — METOPROLOL TARTRATE 25 MG/1
25 TABLET, FILM COATED ORAL 2 TIMES DAILY
Status: DISCONTINUED | OUTPATIENT
Start: 2025-05-16 | End: 2025-05-16

## 2025-05-16 RX ADMIN — AZITHROMYCIN DIHYDRATE 500 MG: 250 TABLET ORAL at 06:26

## 2025-05-16 RX ADMIN — METOPROLOL TARTRATE 12.5 MG: 25 TABLET, FILM COATED ORAL at 17:56

## 2025-05-16 RX ADMIN — AMPICILLIN SODIUM, SULBACTAM SODIUM 3 G: 2; 1 INJECTION, POWDER, FOR SOLUTION INTRAMUSCULAR; INTRAVENOUS at 17:59

## 2025-05-16 RX ADMIN — HEPARIN SODIUM 5000 UNITS: 5000 INJECTION, SOLUTION INTRAVENOUS; SUBCUTANEOUS at 05:51

## 2025-05-16 RX ADMIN — AMPICILLIN SODIUM, SULBACTAM SODIUM 3 G: 2; 1 INJECTION, POWDER, FOR SOLUTION INTRAMUSCULAR; INTRAVENOUS at 12:00

## 2025-05-16 RX ADMIN — METOPROLOL TARTRATE 12.5 MG: 25 TABLET, FILM COATED ORAL at 10:55

## 2025-05-16 RX ADMIN — AMPICILLIN SODIUM, SULBACTAM SODIUM 3 G: 2; 1 INJECTION, POWDER, FOR SOLUTION INTRAMUSCULAR; INTRAVENOUS at 05:53

## 2025-05-16 RX ADMIN — SENNOSIDES, DOCUSATE SODIUM 2 TABLET: 50; 8.6 TABLET, FILM COATED ORAL at 17:54

## 2025-05-16 RX ADMIN — OXYCODONE 5 MG: 5 TABLET ORAL at 10:54

## 2025-05-16 RX ADMIN — ACETAMINOPHEN 1000 MG: 500 TABLET ORAL at 05:51

## 2025-05-16 RX ADMIN — ACETAMINOPHEN 1000 MG: 500 TABLET ORAL at 17:53

## 2025-05-16 RX ADMIN — ENOXAPARIN SODIUM 40 MG: 100 INJECTION SUBCUTANEOUS at 17:54

## 2025-05-16 RX ADMIN — ACETAMINOPHEN 1000 MG: 500 TABLET ORAL at 23:44

## 2025-05-16 RX ADMIN — AMPICILLIN SODIUM, SULBACTAM SODIUM 3 G: 2; 1 INJECTION, POWDER, FOR SOLUTION INTRAMUSCULAR; INTRAVENOUS at 23:50

## 2025-05-16 RX ADMIN — OXYCODONE 5 MG: 5 TABLET ORAL at 17:53

## 2025-05-16 ASSESSMENT — ENCOUNTER SYMPTOMS
BACK PAIN: 1
CHILLS: 0
VOMITING: 0
DIZZINESS: 0
NERVOUS/ANXIOUS: 0
MYALGIAS: 1
CHILLS: 1
ABDOMINAL PAIN: 0
PALPITATIONS: 0
COUGH: 1
SHORTNESS OF BREATH: 1
FLANK PAIN: 1
NAUSEA: 0
FEVER: 0
SORE THROAT: 0
HEMOPTYSIS: 1

## 2025-05-16 ASSESSMENT — PATIENT HEALTH QUESTIONNAIRE - PHQ9
1. LITTLE INTEREST OR PLEASURE IN DOING THINGS: NOT AT ALL
SUM OF ALL RESPONSES TO PHQ9 QUESTIONS 1 AND 2: 0
2. FEELING DOWN, DEPRESSED, IRRITABLE, OR HOPELESS: NOT AT ALL

## 2025-05-16 ASSESSMENT — PAIN DESCRIPTION - PAIN TYPE
TYPE: ACUTE PAIN

## 2025-05-16 ASSESSMENT — SOCIAL DETERMINANTS OF HEALTH (SDOH)
WITHIN THE LAST YEAR, HAVE YOU BEEN KICKED, HIT, SLAPPED, OR OTHERWISE PHYSICALLY HURT BY YOUR PARTNER OR EX-PARTNER?: NO
WITHIN THE PAST 12 MONTHS, YOU WORRIED THAT YOUR FOOD WOULD RUN OUT BEFORE YOU GOT THE MONEY TO BUY MORE: NEVER TRUE
WITHIN THE LAST YEAR, HAVE YOU BEEN HUMILIATED OR EMOTIONALLY ABUSED IN OTHER WAYS BY YOUR PARTNER OR EX-PARTNER?: NO
WITHIN THE PAST 12 MONTHS, THE FOOD YOU BOUGHT JUST DIDN'T LAST AND YOU DIDN'T HAVE MONEY TO GET MORE: NEVER TRUE
IN THE PAST 12 MONTHS, HAS THE ELECTRIC, GAS, OIL, OR WATER COMPANY THREATENED TO SHUT OFF SERVICE IN YOUR HOME?: NO
WITHIN THE LAST YEAR, HAVE TO BEEN RAPED OR FORCED TO HAVE ANY KIND OF SEXUAL ACTIVITY BY YOUR PARTNER OR EX-PARTNER?: NO
WITHIN THE LAST YEAR, HAVE YOU BEEN AFRAID OF YOUR PARTNER OR EX-PARTNER?: NO

## 2025-05-16 ASSESSMENT — FIBROSIS 4 INDEX
FIB4 SCORE: 1.9
FIB4 SCORE: 0.87

## 2025-05-16 NOTE — PROGRESS NOTES
Assumed care at 0700  Pt a & o x4; [place]  Attempted to wean patient off 2L NC but spo2 88%; pt sob with exertion  Pt converted from SR back to afib at 2200 5/15/25; metoprolol ordered. Pt denies chest pain, palpitations.  Pt c/o of right upper back pain/spasms.   Thora site to right posterior back EFREN with no drainage.   Pt up with SB assist. HR sustains in 140s when ambulating.   IV abx as ordered.

## 2025-05-16 NOTE — PROGRESS NOTES
1200  Report given to Yaa on T829. Spouse at bedside. POC discussed with patient and spouse. IR at bedside for thora. Pt to transfer to T8 after thoracentesis.     1330  Pt transferred to T829 by CCT tech. Updated Yaa that pt had 500ml of fluid from right thoracentesis sent for culture.

## 2025-05-16 NOTE — CARE PLAN
The patient is Watcher - Medium risk of patient condition declining or worsening    Shift Goals  Clinical Goals: hemodynamic stability, pain management  Patient Goals: pain mangement, rest  Family Goals: PERLITA    Progress made toward(s) clinical / shift goals:    Problem: Pain - Standard  Goal: Alleviation of pain or a reduction in pain to the patient’s comfort goal  Outcome: Progressing, pain assessed q2h and assessed for any interventions needed, reassessed interventions     Problem: Respiratory  Goal: Patient will achieve/maintain optimum respiratory ventilation and gas exchange  Outcome: Progressing, Weaning off O2 to return back to baseline       Problem: Urinary - Renal Perfusion  Goal: Ability to achieve and maintain adequate renal perfusion and functioning will improve  Outcome: Progressing     Problem: Urinary Elimination  Goal: Establish and maintain regular urinary output  Outcome: Progressing, Pt uses urinal and ambulates up to bathroom to void       Patient is not progressing towards the following goals:      Problem: Hemodynamics  Goal: Patient's hemodynamics, fluid balance and neurologic status will be stable or improve  Outcome: Not Met, pt in afib/SR

## 2025-05-16 NOTE — PROGRESS NOTES
MONITOR SUMMARY  Rate: 's  Rhythm: SR convert into AFIB 2200  Ectopy:   Measurements:

## 2025-05-16 NOTE — PROGRESS NOTES
[facility] ICU Resident Progress Note      Admit Date: 5/14/2025    Resident(s): Melodie ODOM D.O.   Attending:  KIKI RAPP/ Dr. Terry    Patient ID:    Name:  Marlo Gusman   YOB: 1965  Age:  60 y.o.  male   MRN:  [id number]    Hospital Course (carried forward and updated):  Marlo Gusman is a 60 y.o. male with history of HTN, HLD, alcohol use (quit 3 weeks ago) who presented with 2 weeks of cough, fevers, lightheadedness, and generalized myalgias and few days of worsening right flank pain. Found to be in Afib with RVR, mildly hypoxic with loculated R pleural effusion and atelectasis as well as a LLL mass-like consolidation on CTA chest/abdomen.    5/14- admitted to ICU for Afib with RVR  5/15--    Consultants:  None     Interval Events:  N: awake and alert x4, still with R-sided muscle pains with cough  CV: SR convert into AFIB 2200, HR 70-110s, MAP 70-80s off vasoactives  R: 2L NC, CXR unchanged  FEN/GI: regular diet  /Renal: sCr 0.82  Heme: Hgb 14, plt 280  ID: WBC 10K, Tm 98.2, bcx 5/14 ngtd, MRSA(-), UA and culture sent  Skin/MSK:   Endo: within range    Bedside echo with slightly more fluid pocket compared to day prior, webs now noted, will discuss with IR    Family:   Home medications reconciled     Vitals Range last 24h:  Temp:  [36.4 °C (97.5 °F)-36.8 °C (98.2 °F)] 36.4 °C (97.5 °F)  Pulse:  [] 121  Resp:  [20-46] 30  BP: (100-155)/(61-89) 108/74  SpO2:  [87 %-96 %] 90 %      Intake/Output Summary (Last 24 hours) at 5/16/2025 1137  Last data filed at 5/16/2025 0900  Gross per 24 hour   Intake 1360.51 ml   Output 900 ml   Net 460.51 ml        Review of Systems   Constitutional:  Negative for chills and fever.   Respiratory:  Positive for cough, hemoptysis and shortness of breath.    Cardiovascular:  Negative for chest pain.   Gastrointestinal:  Negative for abdominal pain, nausea and vomiting.   Genitourinary:  Positive for flank pain (Right). Negative for dysuria.        PHYSICAL EXAM:  Vitals:    05/16/25 0800 05/16/25 0900 05/16/25 1054 05/16/25 1100   BP: 114/75 110/69 (!) 155/89 108/74   Pulse: (!) 107 96 (!) 114 (!) 121   Resp: (!) 23 20 20 (!) 30   Temp:       TempSrc:       SpO2: 93% 92% 92% 90%   Weight:       Height:        Body mass index is 32.41 kg/m².    O2 therapy: Pulse Oximetry: 90 %, O2 (LPM): 2, O2 Delivery Device: Silicone Nasal Cannula    Date 05/16/25 0700 - 05/17/25 0659   Shift 7098-6951 4918-6727 5200-6315 24 Hour Total   INTAKE   P.O. 400   400     P.O. 400   400   Shift Total 400   400   OUTPUT   Shift Total          400        Physical Exam  Vitals and nursing note reviewed.   HENT:      Head: Normocephalic.      Nose: Nose normal.      Mouth/Throat:      Mouth: Mucous membranes are moist.   Eyes:      Pupils: Pupils are equal, round, and reactive to light.   Cardiovascular:      Rate and Rhythm: Normal rate.      Pulses: Normal pulses.   Pulmonary:      Effort: Pulmonary effort is normal. No respiratory distress.      Breath sounds: Rales present. No wheezing.   Abdominal:      General: Abdomen is flat. There is no distension.      Palpations: Abdomen is soft.   Musculoskeletal:      Right lower leg: No edema.      Left lower leg: No edema.   Skin:     General: Skin is warm.      Capillary Refill: Capillary refill takes less than 2 seconds.   Neurological:      General: No focal deficit present.      Mental Status: He is alert and oriented to person, place, and time.             Recent Labs     05/14/25  1603 05/14/25  1644 05/15/25  0325 05/16/25  0624   SODIUM 136  --  135 132*   POTASSIUM 3.4*  --  4.0 4.5   CHLORIDE 99  --  103 101   CO2 20  --  23 21   BUN 28*  --  24* 15   CREATININE 1.63*  --  1.00 0.82   MAGNESIUM  --  2.2 2.2 2.0   CALCIUM 9.2  --  8.1* 8.6     Recent Labs     05/14/25  1603 05/15/25  0325 05/16/25  0624   ALTSGPT 6 14 17   ASTSGOT 12 16 36   ALKPHOSPHAT 97 65 209*   TBILIRUBIN 1.0 0.9 0.6   LIPASE 36  --   --     GLUCOSE 111* 109* 90     Recent Labs     05/14/25  1603 05/14/25  1644 05/15/25  0325 05/16/25  0624   RBC 4.94  --  4.21* 4.78   HEMOGLOBIN 15.0  --  12.4* 14.2   HEMATOCRIT 44.2  --  38.2* 45.0   PLATELETCT 433  --  301 280   PROTHROMBTM  --  14.7*  --   --    APTT  --  27.0  --   --    INR  --  1.15*  --   --      Recent Labs     05/14/25  1603 05/15/25  0325 05/16/25  0624   WBC 13.4* 11.1* 10.3   NEUTSPOLYS 81.80* 84.30* 77.30*   LYMPHOCYTES 10.40* 7.80* 9.60*   MONOCYTES 5.50 6.30 10.00   EOSINOPHILS 1.30 0.70 2.10   BASOPHILS 0.40 0.30 0.50   ASTSGOT 12 16 36   ALTSGPT 6 14 17   ALKPHOSPHAT 97 65 209*   TBILIRUBIN 1.0 0.9 0.6       Meds:   enoxaparin (LOVENOX) injection  40 mg      metoprolol tartrate  12.5 mg      NORepinephrine  0-1 mcg/kg/min (Ideal) Stopped (05/15/25 0557)    senna-docusate  2 Tablet      And    polyethylene glycol/lytes  1 Packet      ondansetron  4 mg      ondansetron  4 mg      promethazine  12.5-25 mg      acetaminophen  1,000 mg      Followed by    [START ON 5/19/2025] acetaminophen  1,000 mg      oxyCODONE immediate-release  2.5 mg      Or    oxyCODONE immediate-release  5 mg      Or    HYDROmorphone  0.25 mg      ampicillin-sulbactam (UNASYN) IV  3 g Stopped (05/16/25 0623)        Procedures:  Thoracentesis 5/15 unsuccessful    Imaging:  DX-CHEST-PORTABLE (1 VIEW)   Final Result         1. Moderate right pleural effusion and atelectasis unchanged.                  EC-ECHOCARDIOGRAM COMPLETE W/O CONT   Final Result      DX-CHEST-PORTABLE (1 VIEW)   Final Result      1.  Hypoinflation.   2.  Moderate right pleural effusion and underlying atelectasis or consolidation. As previously mentioned, possible underlying pneumonia.   3.  Minimal left pleural effusion and subsegmental atelectatic change.      DX-CHEST-LIMITED (1 VIEW)   Final Result      Airspace opacity in the right lower lobe, concerning for pneumonia.      CT-CTA COMPLETE THORACOABDOMINAL AORTA   Final Result      1.  No  evidence for aortic aneurysm or dissection.   2.  Loculated right pleural effusion with associated compressive atelectasis and consolidation.   3.  Masslike area of consolidation in the left posterior medial costophrenic angle. This may be due to infection or neoplasm. Clinical correlation and follow-up is needed.   4.  Hepatomegaly with fatty infiltration of the liver.                        US-THORACENTESIS PUNCTURE RIGHT    (Results Pending)       ASSESSEMENT and PLAN:    * Atrial fibrillation with RVR (HCC)- (present on admission)  Assessment & Plan  Likely new in setting of sepsis, but can't completely rule out chronicity in the setting of alcohol use   CHADSVASC = 1 ( HTN)   Echo showing enlarged RA, nml LA, unable to estimate RVSP  TSH wnl  - Will hold on systemic AC for now given low CHADSVASC   - Start low-dose metoprolol for rate control    Pleural effusion on right- (present on admission)  Assessment & Plan  Suspect parapneumonic effusion  Some loculations seen on both CT and bedside US  Attempted diagnostic thoracentesis 5/14 but unable to aspirate  Repeat bedside US 05/15/25 with small pleural effusion not amenable to drainage  Repeat bedside US 05/16/25 with somewhat larger pocket  - Will discuss with IR about possible diagnostic thora  - Finished azithromycin, cont Unasyn   - Urine legionella and strep    Shock (HCC)  Assessment & Plan  Septic shock, present upon admission to ICU    Empiric CAP coverage   Vasopressors for MAP > 65  Follow cultures  Maintain euvolemia     Acute hypoxic respiratory failure (HCC)- (present on admission)  Assessment & Plan  2/2 underlying PNA/pleural effusion    SpO2 > 90%  Pulmonary hygiene  Empiric CAP coverage    Mass of lower lobe of left lung- (present on admission)  Assessment & Plan  Incidental finding on admission CT    Will need outpatient follow-up imaging    High anion gap metabolic acidosis- (present on admission)  Assessment & Plan  Mild due to lactic  acidosis, resolved    QUE (acute kidney injury) (HCC)- (present on admission)  Assessment & Plan  Prerenal, improving    Strict I/Os  Renally dosed medications  Avoid nephrotoxic agents as able  Trend     Sepsis (HCC)- (present on admission)  Assessment & Plan  Sepsis criteria based on leukocytosis, tachycardia  Elevated lactic acid  Source is pulmonary  Received 2lt IVF in ER  - Continue assessing fluid needs   - Follow blood cultures  - Target MAP >65    Hypertension  Assessment & Plan  Reintroduce oral antihypertensives when appropriate        VTE: heparin  Ulcer: n/a  Lines: pIVs  De-escalation of abx: cont Unasyn  Dispo: transfer to tele  Code status: full code    Melodie ODOM D.O. PGY-2

## 2025-05-16 NOTE — PROGRESS NOTES
4 Eyes Skin Assessment Completed by PAL Quevedo and PAL Parry.    Head WDL  Ears Blanching  Nose WDL  Mouth WDL  Neck WDL  Breast/Chest WDL  Shoulder Blades WDL  Spine WDL; small scab on right posterior upper back from thora; open to air  (R) Arm/Elbow/Hand WDL  (L) Arm/Elbow/Hand WDL  Abdomen WDL  Groin WDL  Scrotum/Coccyx/Buttocks Blanching  (R) Leg WDL  (L) Leg WDL  (R) Heel/Foot/Toe WDL  (L) Heel/Foot/Toe WDL          Devices In Places Tele Box and Pulse Ox, oxygen      Interventions In Place Gray Ear Foams, NC W/Ear Foams, Low Air Loss Mattress, and Dri-Eliseo Pads    Possible Skin Injury No    Pictures Uploaded Into Epic N/A  Wound Consult Placed N/A  RN Wound Prevention Protocol Ordered No

## 2025-05-17 ENCOUNTER — APPOINTMENT (OUTPATIENT)
Dept: RADIOLOGY | Facility: MEDICAL CENTER | Age: 60
DRG: 871 | End: 2025-05-17
Attending: INTERNAL MEDICINE
Payer: COMMERCIAL

## 2025-05-17 LAB
ALBUMIN SERPL BCP-MCNC: 2.9 G/DL (ref 3.2–4.9)
ALBUMIN/GLOB SERPL: 0.9 G/DL
ALP SERPL-CCNC: 96 U/L (ref 30–99)
ALT SERPL-CCNC: 18 U/L (ref 2–50)
ANION GAP SERPL CALC-SCNC: 11 MMOL/L (ref 7–16)
AST SERPL-CCNC: 14 U/L (ref 12–45)
BASOPHILS # BLD AUTO: 0.4 % (ref 0–1.8)
BASOPHILS # BLD: 0.03 K/UL (ref 0–0.12)
BILIRUB SERPL-MCNC: 0.6 MG/DL (ref 0.1–1.5)
BUN SERPL-MCNC: 14 MG/DL (ref 8–22)
CALCIUM ALBUM COR SERPL-MCNC: 9.3 MG/DL (ref 8.5–10.5)
CALCIUM SERPL-MCNC: 8.4 MG/DL (ref 8.5–10.5)
CHLORIDE SERPL-SCNC: 99 MMOL/L (ref 96–112)
CO2 SERPL-SCNC: 26 MMOL/L (ref 20–33)
CREAT SERPL-MCNC: 0.83 MG/DL (ref 0.5–1.4)
EOSINOPHIL # BLD AUTO: 0.22 K/UL (ref 0–0.51)
EOSINOPHIL NFR BLD: 2.6 % (ref 0–6.9)
ERYTHROCYTE [DISTWIDTH] IN BLOOD BY AUTOMATED COUNT: 41.2 FL (ref 35.9–50)
FUNGUS SPEC FUNGUS STN: NORMAL
GFR SERPLBLD CREATININE-BSD FMLA CKD-EPI: 99 ML/MIN/1.73 M 2
GLOBULIN SER CALC-MCNC: 3.2 G/DL (ref 1.9–3.5)
GLUCOSE SERPL-MCNC: 100 MG/DL (ref 65–99)
HCT VFR BLD AUTO: 36.3 % (ref 42–52)
HGB BLD-MCNC: 11.8 G/DL (ref 14–18)
IMM GRANULOCYTES # BLD AUTO: 0.05 K/UL (ref 0–0.11)
IMM GRANULOCYTES NFR BLD AUTO: 0.6 % (ref 0–0.9)
LYMPHOCYTES # BLD AUTO: 1.03 K/UL (ref 1–4.8)
LYMPHOCYTES NFR BLD: 12.1 % (ref 22–41)
MAGNESIUM SERPL-MCNC: 1.9 MG/DL (ref 1.5–2.5)
MCH RBC QN AUTO: 29.4 PG (ref 27–33)
MCHC RBC AUTO-ENTMCNC: 32.5 G/DL (ref 32.3–36.5)
MCV RBC AUTO: 90.5 FL (ref 81.4–97.8)
MONOCYTES # BLD AUTO: 0.63 K/UL (ref 0–0.85)
MONOCYTES NFR BLD AUTO: 7.4 % (ref 0–13.4)
NEUTROPHILS # BLD AUTO: 6.54 K/UL (ref 1.82–7.42)
NEUTROPHILS NFR BLD: 76.9 % (ref 44–72)
NRBC # BLD AUTO: 0 K/UL
NRBC BLD-RTO: 0 /100 WBC (ref 0–0.2)
PLATELET # BLD AUTO: 361 K/UL (ref 164–446)
PMV BLD AUTO: 9.1 FL (ref 9–12.9)
POTASSIUM SERPL-SCNC: 3.5 MMOL/L (ref 3.6–5.5)
PROT SERPL-MCNC: 6.1 G/DL (ref 6–8.2)
RBC # BLD AUTO: 4.01 M/UL (ref 4.7–6.1)
SIGNIFICANT IND 70042: NORMAL
SITE SITE: NORMAL
SODIUM SERPL-SCNC: 136 MMOL/L (ref 135–145)
SOURCE SOURCE: NORMAL
WBC # BLD AUTO: 8.5 K/UL (ref 4.8–10.8)

## 2025-05-17 PROCEDURE — A9270 NON-COVERED ITEM OR SERVICE: HCPCS

## 2025-05-17 PROCEDURE — 700102 HCHG RX REV CODE 250 W/ 637 OVERRIDE(OP): Performed by: PHYSICIAN ASSISTANT

## 2025-05-17 PROCEDURE — 85025 COMPLETE CBC W/AUTO DIFF WBC: CPT

## 2025-05-17 PROCEDURE — A9270 NON-COVERED ITEM OR SERVICE: HCPCS | Performed by: INTERNAL MEDICINE

## 2025-05-17 PROCEDURE — 700102 HCHG RX REV CODE 250 W/ 637 OVERRIDE(OP): Performed by: INTERNAL MEDICINE

## 2025-05-17 PROCEDURE — 700111 HCHG RX REV CODE 636 W/ 250 OVERRIDE (IP): Mod: JZ | Performed by: INTERNAL MEDICINE

## 2025-05-17 PROCEDURE — 71250 CT THORAX DX C-: CPT

## 2025-05-17 PROCEDURE — 99233 SBSQ HOSP IP/OBS HIGH 50: CPT | Performed by: INTERNAL MEDICINE

## 2025-05-17 PROCEDURE — A9270 NON-COVERED ITEM OR SERVICE: HCPCS | Performed by: PHYSICIAN ASSISTANT

## 2025-05-17 PROCEDURE — 83735 ASSAY OF MAGNESIUM: CPT

## 2025-05-17 PROCEDURE — 36415 COLL VENOUS BLD VENIPUNCTURE: CPT

## 2025-05-17 PROCEDURE — 80053 COMPREHEN METABOLIC PANEL: CPT

## 2025-05-17 PROCEDURE — 770020 HCHG ROOM/CARE - TELE (206)

## 2025-05-17 PROCEDURE — 99232 SBSQ HOSP IP/OBS MODERATE 35: CPT | Performed by: INTERNAL MEDICINE

## 2025-05-17 PROCEDURE — 700105 HCHG RX REV CODE 258: Performed by: INTERNAL MEDICINE

## 2025-05-17 PROCEDURE — 700102 HCHG RX REV CODE 250 W/ 637 OVERRIDE(OP)

## 2025-05-17 RX ORDER — ASPIRIN 81 MG/1
81 TABLET ORAL DAILY
Status: DISCONTINUED | OUTPATIENT
Start: 2025-05-17 | End: 2025-05-17

## 2025-05-17 RX ORDER — GUAIFENESIN 600 MG/1
600 TABLET, EXTENDED RELEASE ORAL EVERY 12 HOURS PRN
Status: DISCONTINUED | OUTPATIENT
Start: 2025-05-17 | End: 2025-05-20

## 2025-05-17 RX ORDER — METOPROLOL TARTRATE 25 MG/1
25 TABLET, FILM COATED ORAL 2 TIMES DAILY
Status: DISCONTINUED | OUTPATIENT
Start: 2025-05-17 | End: 2025-05-27 | Stop reason: HOSPADM

## 2025-05-17 RX ORDER — DIAZEPAM 5 MG/1
5 TABLET ORAL EVERY 8 HOURS PRN
Status: DISCONTINUED | OUTPATIENT
Start: 2025-05-17 | End: 2025-05-27 | Stop reason: HOSPADM

## 2025-05-17 RX ADMIN — ACETAMINOPHEN 1000 MG: 500 TABLET ORAL at 23:34

## 2025-05-17 RX ADMIN — ACETAMINOPHEN 1000 MG: 500 TABLET ORAL at 04:58

## 2025-05-17 RX ADMIN — ACETAMINOPHEN 1000 MG: 500 TABLET ORAL at 17:31

## 2025-05-17 RX ADMIN — AMPICILLIN SODIUM, SULBACTAM SODIUM 3 G: 2; 1 INJECTION, POWDER, FOR SOLUTION INTRAMUSCULAR; INTRAVENOUS at 17:43

## 2025-05-17 RX ADMIN — OXYCODONE 5 MG: 5 TABLET ORAL at 09:41

## 2025-05-17 RX ADMIN — DIAZEPAM 5 MG: 5 TABLET ORAL at 12:41

## 2025-05-17 RX ADMIN — ENOXAPARIN SODIUM 40 MG: 100 INJECTION SUBCUTANEOUS at 17:32

## 2025-05-17 RX ADMIN — METOPROLOL TARTRATE 25 MG: 25 TABLET, FILM COATED ORAL at 17:31

## 2025-05-17 RX ADMIN — AMPICILLIN SODIUM, SULBACTAM SODIUM 3 G: 2; 1 INJECTION, POWDER, FOR SOLUTION INTRAMUSCULAR; INTRAVENOUS at 12:45

## 2025-05-17 RX ADMIN — ACETAMINOPHEN 1000 MG: 500 TABLET ORAL at 12:40

## 2025-05-17 RX ADMIN — GUAIFENESIN 600 MG: 600 TABLET, EXTENDED RELEASE ORAL at 17:32

## 2025-05-17 RX ADMIN — AMPICILLIN SODIUM, SULBACTAM SODIUM 3 G: 2; 1 INJECTION, POWDER, FOR SOLUTION INTRAMUSCULAR; INTRAVENOUS at 23:37

## 2025-05-17 RX ADMIN — AMPICILLIN SODIUM, SULBACTAM SODIUM 3 G: 2; 1 INJECTION, POWDER, FOR SOLUTION INTRAMUSCULAR; INTRAVENOUS at 05:03

## 2025-05-17 RX ADMIN — GUAIFENESIN 600 MG: 600 TABLET, EXTENDED RELEASE ORAL at 05:44

## 2025-05-17 RX ADMIN — METOPROLOL TARTRATE 12.5 MG: 25 TABLET, FILM COATED ORAL at 04:58

## 2025-05-17 ASSESSMENT — ENCOUNTER SYMPTOMS
SHORTNESS OF BREATH: 1
GASTROINTESTINAL NEGATIVE: 1
COUGH: 1
WEAKNESS: 1
CARDIOVASCULAR NEGATIVE: 1
EYES NEGATIVE: 1
MUSCULOSKELETAL NEGATIVE: 1
PSYCHIATRIC NEGATIVE: 1
FLANK PAIN: 1

## 2025-05-17 ASSESSMENT — PAIN DESCRIPTION - PAIN TYPE
TYPE: ACUTE PAIN

## 2025-05-17 ASSESSMENT — COGNITIVE AND FUNCTIONAL STATUS - GENERAL
DRESSING REGULAR UPPER BODY CLOTHING: A LITTLE
DRESSING REGULAR LOWER BODY CLOTHING: A LITTLE
MOBILITY SCORE: 23
CLIMB 3 TO 5 STEPS WITH RAILING: A LITTLE
SUGGESTED CMS G CODE MODIFIER DAILY ACTIVITY: CJ
HELP NEEDED FOR BATHING: A LITTLE
SUGGESTED CMS G CODE MODIFIER MOBILITY: CI
DAILY ACTIVITIY SCORE: 21

## 2025-05-17 ASSESSMENT — FIBROSIS 4 INDEX: FIB4 SCORE: 0.56

## 2025-05-17 NOTE — CARE PLAN
The patient is Stable - Low risk of patient condition declining or worsening    Shift Goals  Clinical Goals: VSS, wean O2, IV abx  Patient Goals: rest  Family Goals: scot    Progress made toward(s) clinical / shift goals:    Problem: Knowledge Deficit - Standard  Goal: Patient and family/care givers will demonstrate understanding of plan of care, disease process/condition, diagnostic tests and medications  Description: Target End Date:  1-3 days or as soon as patient condition allowsDocument in Patient Education1.  Patient and family/caregiver oriented to unit, equipment, visitation policy and means for communicating concern2.  Complete/review Learning Assessment3.  Assess knowledge level of disease process/condition, treatment plan, diagnostic tests and medications4.  Explain disease process/condition, treatment plan, diagnostic tests and medications  Outcome: Progressing     Problem: Pain - Standard  Goal: Alleviation of pain or a reduction in pain to the patient’s comfort goal  Description: Target End Date:  Prior to discharge or change in level of careDocument on Vitals flowsheet1.  Document pain using the appropriate pain scale per order or unit policy2.  Educate and implement non-pharmacologic comfort measures (i.e. relaxation, distraction, massage, cold/heat therapy, etc.)3.  Pain management medications as ordered4.  Reassess pain after pain med administration per policy5.  If opiods administered assess patient's response to pain medication is appropriate per POSS sedation scale6.  Follow pain management plan developed in collaboration with patient and interdisciplinary team (including palliative care or pain specialists if applicable)  Outcome: Progressing     Problem: Hemodynamics  Goal: Patient's hemodynamics, fluid balance and neurologic status will be stable or improve  Description: Target End Date:  Prior to discharge or change in level of careDocument on Assessment and I/O flowsheet templates1.  Monitor  vital signs, pulse oximetry and cardiac monitor per provider order and/or policy2.  Maintain blood pressure per provider order3.  Hemodynamic monitoring per provider order4.  Manage IV fluids and IV infusions5.  Monitor intake and output6.  Daily weights per unit policy or provider order7.  Assess peripheral pulses and capillary refill8.  Assess color and body temperature9.  Position patient for maximum circulation/cardiac [id number]. Monitor for signs/symptoms of excessive [id number]. Assess mental status, restlessness and changes in level of [id number]. Monitor temperature and report fever or hypothermia to provider immediately. Consideration of targeted temperature management.  Outcome: Progressing     Problem: Respiratory  Goal: Patient will achieve/maintain optimum respiratory ventilation and gas exchange  Description: Target End Date:  Prior to discharge or change in level of careDocument on Assessment flowsheet1.  Assess and monitor rate, rhythm, depth and effort of respiration2.  Breath sounds assessed qshift and/or as needed3.  Assess O2 saturation, administer/titrate oxygen as ordered4.  Position patient for maximum ventilatory efficiency5.  Turn, cough, and deep breath with splinting to improve effectiveness6.  Collaborate with RT to administer medication/treatments per order7.  Encourage use of incentive spirometer and encourage patient to cough after use and utilize splinting techniques if applicable8.  Airway suctioning9.  Monitor sputum production for changes in color, consistency and gudphhfvz79. Perform frequent oral [id number]. Alternate physical activity with rest periods  Outcome: Progressing     Problem: Fall Risk  Goal: Patient will remain free from falls  Description: Target End Date:  Prior to discharge or change in level of careDocument interventions on the Angela Graves Fall Risk Assessment1.  Assess for fall risk factors2.  Implement fall precautions  Outcome: Progressing

## 2025-05-17 NOTE — PROGRESS NOTES
Hospital Medicine Daily Progress Note    Date of Service  5/17/2025    Chief Complaint  Marlo Gusman is a 60 y.o. male admitted 5/14/2025 with Flank Pain (Right sided flank pain x 5 days, pt denies dysuria but reports pain is darker than normal. +nausea. )        Hospital Course  No notes on file    Interval Problem Update  Patient seen and examine at bedside  Medically cleared: No TOMORROW culture pathology from pleural fluid CXR if her chooses to foloow pathology outpt  Pending:  Estimated Discharge Day:  Disposition:     Managing hypoxia/prob postobstructive pneumonia/R pleural eff can't r/o mass/LLL lung mass, QUE and Aib with RVR. He denies smoking but uses alcohol and illicit drugs. 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 He has a history of recent URI, hypertension. Presented with flu like symptoms, dyspnea. Denies inintended weight loss.   S/p 5/16 thoracentesis moderate WBCs no organisms on Gram. Cultures PENDING pathology PENDING. 5/14 blood cx NGTD. COnsulted Dr. Badillo, Pulmonology to follow.     95% on 1.5LO2  HR elevated, increased BB.  CHADSVasc 1 and male and less than 64yo. Among the risk factors with a one-point value, age 65 to 74 years and the presence of heart failure have the greatest effect on thromboembolic risk and OAC is recommended in patients with either of these risk factors. In his case no indication for anticoag for now.   At bedside patient is stable. I reviewed TC scan with him.   I reviewed the chart along with vitals, labs, imaging, test (both pending and resulted) and recommendations from specialists and interdisciplinary team.  I have discussed this patient's plan of care and discharge plan at IDT rounds today with Case Management, Nursing, Nursing leadership, and other members of the IDT team.      Consultants/Specialty  Intensivist admitted  Hospitalist  Pulmonology    Code Status  Full Code    Disposition  The patient is not medically cleared for discharge to home  or a post-acute facility.      I have placed the appropriate orders for post-discharge needs.    Review of Systems  Review of Systems   Constitutional:  Positive for malaise/fatigue.   Respiratory:  Positive for shortness of breath.    Genitourinary:  Positive for flank pain.        Physical Exam  Temp:  [36.3 °C (97.3 °F)-37.1 °C (98.8 °F)] 36.8 °C (98.2 °F)  Pulse:  [] 108  Resp:  [18-20] 20  BP: (101-126)/(70-85) 126/85  SpO2:  [92 %-95 %] 92 %    Physical Exam  Vitals and nursing note reviewed.   Constitutional:       Appearance: He is obese.   HENT:      Head: Normocephalic and atraumatic.      Right Ear: External ear normal.      Left Ear: External ear normal.      Nose: Nose normal.      Mouth/Throat:      Mouth: Mucous membranes are moist.   Eyes:      General: No scleral icterus.     Conjunctiva/sclera: Conjunctivae normal.   Cardiovascular:      Rate and Rhythm: Normal rate and regular rhythm.      Heart sounds: No murmur heard.     No friction rub. No gallop.   Pulmonary:      Effort: Pulmonary effort is normal.      Breath sounds: Normal breath sounds.      Comments: Decreased breath sounds  Abdominal:      General: Abdomen is flat. Bowel sounds are normal. There is no distension.      Palpations: Abdomen is soft.      Tenderness: There is no abdominal tenderness. There is no guarding.   Musculoskeletal:         General: Normal range of motion.      Cervical back: Normal range of motion and neck supple.   Skin:     General: Skin is warm.   Neurological:      Mental Status: He is alert and oriented to person, place, and time. Mental status is at baseline.   Psychiatric:         Mood and Affect: Mood normal.         Behavior: Behavior normal.         Thought Content: Thought content normal.         Judgment: Judgment normal.         Fluids    Intake/Output Summary (Last 24 hours) at 5/17/2025 1556  Last data filed at 5/16/2025 1900  Gross per 24 hour   Intake 800 ml   Output --   Net 800 ml         Laboratory  Recent Labs     05/15/25  0325 05/16/25  0624 05/17/25  0252   WBC 11.1* 10.3 8.5   RBC 4.21* 4.78 4.01*   HEMOGLOBIN 12.4* 14.2 11.8*   HEMATOCRIT 38.2* 45.0 36.3*   MCV 90.7 94.1 90.5   MCH 29.5 29.7 29.4   MCHC 32.5 31.6* 32.5   RDW 40.5 42.8 41.2   PLATELETCT 301 280 361   MPV 9.5 9.4 9.1     Recent Labs     05/15/25  0325 05/16/25  0624 05/17/25  0252   SODIUM 135 132* 136   POTASSIUM 4.0 4.5 3.5*   CHLORIDE 103 101 99   CO2 23 21 26   GLUCOSE 109* 90 100*   BUN 24* 15 14   CREATININE 1.00 0.82 0.83   CALCIUM 8.1* 8.6 8.4*     Recent Labs     05/14/25  1644   APTT 27.0   INR 1.15*               Imaging  DX-CHEST-PORTABLE (1 VIEW)   Final Result      1.  Persistent right pleural effusion and associated atelectasis/consolidation.   2.  No pneumothorax status post right-sided thoracentesis.      US-THORACENTESIS PUNCTURE RIGHT   Final Result      1. Ultrasound guided right sided diagnostic and therapeutic thoracentesis.      2. 500 mL of fluid withdrawn.      DX-CHEST-PORTABLE (1 VIEW)   Final Result         1. Moderate right pleural effusion and atelectasis unchanged.                  EC-ECHOCARDIOGRAM COMPLETE W/O CONT   Final Result      DX-CHEST-PORTABLE (1 VIEW)   Final Result      1.  Hypoinflation.   2.  Moderate right pleural effusion and underlying atelectasis or consolidation. As previously mentioned, possible underlying pneumonia.   3.  Minimal left pleural effusion and subsegmental atelectatic change.      DX-CHEST-LIMITED (1 VIEW)   Final Result      Airspace opacity in the right lower lobe, concerning for pneumonia.      CT-CTA COMPLETE THORACOABDOMINAL AORTA   Final Result      1.  No evidence for aortic aneurysm or dissection.   2.  Loculated right pleural effusion with associated compressive atelectasis and consolidation.   3.  Masslike area of consolidation in the left posterior medial costophrenic angle. This may be due to infection or neoplasm. Clinical correlation and follow-up  is needed.   4.  Hepatomegaly with fatty infiltration of the liver.                             Assessment/Plan  * Atrial fibrillation with RVR (HCC)- (present on admission)  Assessment & Plan  Resolve of RVR  Monitor on telemetry  Metoprolol 12.5mg BID  Vitals:    05/17/25 1232   BP: 126/85   Pulse: (!) 108   Resp: 20   Temp: 36.8 °C (98.2 °F)   SpO2: 92%     Inc BB    Hypertension  Assessment & Plan  Metoprolol.  Hold outpatientHCTZrestart losartan and amlodipine as BP dictate/allows  Vitals:    05/17/25 1232   BP: 126/85   Pulse: (!) 108   Resp: 20   Temp: 36.8 °C (98.2 °F)   SpO2: 92%     Stable.    Acute hypoxic respiratory failure (HCC)- (present on admission)  Assessment & Plan  Concern of recent URI and probable post obstructive pneumonia.  Thoracentesis with 500cc removed  Follow up on pleural effusion evaluation  Titrate oxygen  Mobilize  Antitussives as need    Mass of lower lobe of left lung- (present on admission)  Assessment & Plan  Mass vs consolidation  Follow up on pleural effusion cytology  May need future follow up CT and or biopsy.  Treating as infection at this time.    Pulmonology following    High anion gap metabolic acidosis- (present on admission)  Assessment & Plan  resolve    QUE (acute kidney injury) (HCC)- (present on admission)  Assessment & Plan  Suspected prerenal and hypovolemia  Improved with IV fluids and resolve of Afib rvr  Monitor on tele  Recent Labs     05/15/25  0325 05/16/25  0624 05/17/25  0252   SODIUM 135 132* 136   POTASSIUM 4.0 4.5 3.5*   CHLORIDE 103 101 99   CO2 23 21 26   GLUCOSE 109* 90 100*   BUN 24* 15 14   CREATININE 1.00 0.82 0.83         Pleural effusion on right- (present on admission)  Assessment & Plan  Had a thoracentesis.  F/U post thoracentesis xray still with opacification of right lower lung    Follow cultures  Pulmnology following    Sepsis (HCC)- (present on admission)  Assessment & Plan  S/p pressors and has had IV fluid resuscitation  Monitor I/O's,  labs, and vitals.    Follow pleural fluid cultures         VTE prophylaxis: VTE Selection  Lovenox ppx  I have performed a physical exam and reviewed and updated ROS and Plan today (5/17/2025). In review of yesterday's note (5/16/2025), there are no changes except as documented above.    Patient is has a high medical complexity, complex decision making and is at high risk for complication, morbidity, and mortality, thus requiring the highest level of my preparedness for sudden, emergent intervention. Medical decision making is therefore complex. I provided  services, which included ordering labs and/or imaging, and discussing the case with various consultants.medication orders, frequent reevaluations of the patient's condition and response to treatment. Time was also devoted to counseling and coordinating care including review of records, pertinent lab data and studies, as well as discussing diagnostic evaluation and work up, planned therapeutic interventions and future disposition of care. Where indicated, the assessment and plan reflect discussion of patient with consultants, other healthcare providers, family members, and additional research needed to obtain further information in formulating the plan of care for Marlo Gusman. Total time spent was 53 minutes.

## 2025-05-17 NOTE — CONSULTS
Hospital Medicine Consultation    Date of Service  5/16/2025    Referring Physician  Nigel Terry M.D.    Consulting Physician  John Jensen D.O.    Reason for Consultation  Transfer of care out of ICU    History of Presenting Illness  Marlo Gusman is a 60 y.o. male who presented 5/14/2025 with shortness of breath and right sided flank pain x 5 days.  He has a hx of HTN, DLD.  He states he feels his flank pain was from a recent upper respiratory infection he was battling for past 2 weeks with ongoing fever, chills, and coughing.     A CT chest showed a loculated right pleural effusion.  Initially he required pressor support. He was in atrial fib with RVR and hypoxic. An initial thoracentesis was unsuccessfulHe had a thoracentesis with 500cc removed with improvement of his breathing.    5/16:  Feeling better after his thoracentesis.  Still with rip pain on coughing.  Improved BP    Review of Systems  Review of Systems   Constitutional:  Positive for chills and malaise/fatigue. Negative for fever.   HENT:  Negative for congestion and sore throat.    Respiratory:  Positive for cough and shortness of breath.    Cardiovascular:  Positive for chest pain (with cough). Negative for palpitations and leg swelling.   Gastrointestinal:  Negative for abdominal pain and nausea.   Musculoskeletal:  Positive for back pain and myalgias.   Neurological:  Negative for dizziness.   Psychiatric/Behavioral:  The patient is not nervous/anxious.        Past Medical History   has no past medical history on file.    Surgical History   has no past surgical history on file.    Family History  family history is not on file.    Social History   reports that he has never smoked. He has never used smokeless tobacco. He reports current alcohol use. He reports current drug use.    Medications    Current Facility-Administered Medications:     enoxaparin (LOVENOX) injection    metoprolol tartrate    NORepinephrine    senna-docusate **AND**  polyethylene glycol/lytes    ondansetron    ondansetron    promethazine    acetaminophen **FOLLOWED BY** [START ON 5/19/2025] acetaminophen    oxyCODONE immediate-release **OR** oxyCODONE immediate-release **OR** HYDROmorphone    ampicillin-sulbactam (UNASYN) IV      Allergies  Patient has no known allergies.      Physical Exam  Temp:  [36.3 °C (97.3 °F)-37 °C (98.6 °F)] 36.3 °C (97.3 °F)  Pulse:  [] 92  Resp:  [18-46] 18  BP: (100-155)/(61-89) 105/73  SpO2:  [87 %-96 %] 93 %    Physical Exam  Vitals reviewed.   Constitutional:       Appearance: Normal appearance.   HENT:      Head: Atraumatic.      Nose: Nose normal.   Eyes:      General:         Right eye: No discharge.         Left eye: No discharge.      Conjunctiva/sclera: Conjunctivae normal.   Cardiovascular:      Rate and Rhythm: Normal rate and regular rhythm.      Heart sounds: No murmur heard.  Pulmonary:      Effort: Pulmonary effort is normal.      Breath sounds: Examination of the left-lower field reveals decreased breath sounds. Decreased breath sounds present.   Abdominal:      General: Bowel sounds are normal. There is no distension.      Palpations: Abdomen is soft.      Tenderness: There is no abdominal tenderness.   Musculoskeletal:      Cervical back: Neck supple.      Right lower leg: No edema.      Left lower leg: No edema.   Lymphadenopathy:      Cervical: No cervical adenopathy.   Skin:     General: Skin is warm.      Coloration: Skin is not jaundiced.   Neurological:      General: No focal deficit present.      Mental Status: He is alert and oriented to person, place, and time.      Cranial Nerves: No cranial nerve deficit.   Psychiatric:         Mood and Affect: Mood normal.         Behavior: Behavior normal.         Fluids  Date 05/16/25 0700 - 05/17/25 0659   Shift 8603-3288 5648-9560 9552-0860 24 Hour Total   INTAKE   P.O. 400   400   IV Piggyback 100   100   Shift Total 500   500   OUTPUT   Shift Total       Weight (kg) 109.9  109.9 109.9 109.9       Laboratory  Recent Labs     05/14/25  1603 05/15/25  0325 05/16/25  0624   WBC 13.4* 11.1* 10.3   RBC 4.94 4.21* 4.78   HEMOGLOBIN 15.0 12.4* 14.2   HEMATOCRIT 44.2 38.2* 45.0   MCV 89.5 90.7 94.1   MCH 30.4 29.5 29.7   MCHC 33.9 32.5 31.6*   RDW 40.4 40.5 42.8   PLATELETCT 433 301 280   MPV 9.5 9.5 9.4     Recent Labs     05/14/25  1603 05/15/25  0325 05/16/25  0624   SODIUM 136 135 132*   POTASSIUM 3.4* 4.0 4.5   CHLORIDE 99 103 101   CO2 20 23 21   GLUCOSE 111* 109* 90   BUN 28* 24* 15   CREATININE 1.63* 1.00 0.82   CALCIUM 9.2 8.1* 8.6     Recent Labs     05/14/25  1644   APTT 27.0   INR 1.15*                 Imaging  DX-CHEST-PORTABLE (1 VIEW)   Final Result      1.  Persistent right pleural effusion and associated atelectasis/consolidation.   2.  No pneumothorax status post right-sided thoracentesis.      US-THORACENTESIS PUNCTURE RIGHT   Final Result      1. Ultrasound guided right sided diagnostic and therapeutic thoracentesis.      2. 500 mL of fluid withdrawn.      DX-CHEST-PORTABLE (1 VIEW)   Final Result         1. Moderate right pleural effusion and atelectasis unchanged.                  EC-ECHOCARDIOGRAM COMPLETE W/O CONT   Final Result      DX-CHEST-PORTABLE (1 VIEW)   Final Result      1.  Hypoinflation.   2.  Moderate right pleural effusion and underlying atelectasis or consolidation. As previously mentioned, possible underlying pneumonia.   3.  Minimal left pleural effusion and subsegmental atelectatic change.      DX-CHEST-LIMITED (1 VIEW)   Final Result      Airspace opacity in the right lower lobe, concerning for pneumonia.      CT-CTA COMPLETE THORACOABDOMINAL AORTA   Final Result      1.  No evidence for aortic aneurysm or dissection.   2.  Loculated right pleural effusion with associated compressive atelectasis and consolidation.   3.  Masslike area of consolidation in the left posterior medial costophrenic angle. This may be due to infection or neoplasm. Clinical  correlation and follow-up is needed.   4.  Hepatomegaly with fatty infiltration of the liver.                            Assessment/Plan  * Atrial fibrillation with RVR (HCC)- (present on admission)  Assessment & Plan  Resolve of RVR  Monitor on telemetry  Metoprolol 12.5mg BID    Hypertension  Assessment & Plan  Metoprolol.  Hold outpatientHCTZrestart losartan and amlodipine as BP dictate/allows    Acute hypoxic respiratory failure (HCC)- (present on admission)  Assessment & Plan  Concern of recent URI and probable post obstructive pneumonia.  Thoracentesis with 500cc removed  Follow up on pleural effusion evaluation  Titrate oxygen  Mobilize  Antitussives as need    Mass of lower lobe of left lung- (present on admission)  Assessment & Plan  Mass vs consolidation  Follow up on pleural effusion cytology  May need future follow up CT and or biopsy.  Treating as infection at this time.    High anion gap metabolic acidosis- (present on admission)  Assessment & Plan  resolve    QUE (acute kidney injury) (HCC)- (present on admission)  Assessment & Plan  Suspected prerenal and hypovolemia  Improved with IV fluids and resolve of Afib rvr  Monitor on tele    Pleural effusion on right- (present on admission)  Assessment & Plan  Had a thoracentesis.  F/U post thoracentesis xray still with opacification of right lower lung    Sepsis (HCC)- (present on admission)  Assessment & Plan  S/p pressors and has had IV fluid resuscitation  Monitor I/O's, labs, and vitals.

## 2025-05-17 NOTE — ASSESSMENT & PLAN NOTE
Had a thoracentesis.  F/U post thoracentesis xray still with opacification of right lower lung  Pulmonology following, continue intrapleural lytic therapy, completed 3 days tx  Effusion improving  CT chest shows small residual effusion; loculated per pulmonology review  Plan for surgical decortication tentatively tomorrow 5/27, NPO at midnight

## 2025-05-17 NOTE — PROGRESS NOTES
Bedside report received from off going RN/tech: Rosalia assumed care of patient.     Fall Risk Score: LOW RISK  Fall risk interventions in place: Provide patient/family education based on risk assessment and Educate patient/family to call staff for assistance when getting out of bed  Bed type: Regular (Earl Score less than 17 interventions in place)  Patient on cardiac monitor: Yes  IVF/IV medications: Not Applicable   Oxygen: How many liters 2L, Traced the line to wall oxygen, and No oxygen tank in room  Bedside sitter: Not Applicable   Isolation: Not applicable

## 2025-05-17 NOTE — ASSESSMENT & PLAN NOTE
S/p pressors and has had IV fluid resuscitation  Monitor I/O's, labs, and vitals.    Follow pleural fluid cultures

## 2025-05-17 NOTE — PROGRESS NOTES
4 Eyes Skin Assessment Completed by PAL Mon and Pravan, ACT-A.    Head WDL  Ears Redness and Blanching  Nose WDL  Mouth WDL  Neck WDL  Breast/Chest WDL  Shoulder Blades WDL  Spine/Back R flank thoracentesis site  (R) Arm/Elbow/Hand Redness and Blanching  (L) Arm/Elbow/Hand Redness and Blanching  Abdomen WDL  Groin WDL  Scrotum/Coccyx/Buttocks Redness and Blanching  (R) Leg WDL  (L) Leg WDL  (R) Heel/Foot/Toe WDL  (L) Heel/Foot/Toe WDL          Devices In Places Tele Box, Blood Pressure Cuff, Pulse Ox, and Nasal Cannula      Interventions In Place Gray Ear Foams, NC W/Ear Foams, Pillows, Heels Loaded W/Pillows, and Pressure Redistribution Mattress    Possible Skin Injury No    Pictures Uploaded Into Epic N/A  Wound Consult Placed N/A  RN Wound Prevention Protocol Ordered No

## 2025-05-17 NOTE — ASSESSMENT & PLAN NOTE
Metoprolol.  Hold outpatientHCTZrestart losartan and amlodipine as BP dictate/allows  Vitals:    05/17/25 1232   BP: 126/85   Pulse: (!) 108   Resp: 20   Temp: 36.8 °C (98.2 °F)   SpO2: 92%     Stable.

## 2025-05-17 NOTE — ASSESSMENT & PLAN NOTE
Mass vs consolidation  Follow up on pleural effusion cytology  May need future follow up CT and or biopsy.  Treating as infection at this time.    Pulmonology following

## 2025-05-17 NOTE — CONSULTS
Pulmonary Consultation    Date of consult: 5/17/2025    Referring Physician  Johnnie Huston M.D.    Reason for Consultation  Loculated pleural effusion    History of Presenting Illness  60 y.o. male who presented 5/14/2025 with transferred out of the ICU with sepsis 2/2 pna on the right needing pressors.  Patient presented on 5/14/2025 with shortness of breath and right-sided flank pain.  CT chest showed loculated right pleural effusion with a parapneumonic pneumonia requiring pressor support in the ICU and also had A-fib with RVR.  Initially thoracentesis was not successful but subsequently was able to get 500 cc on 5/16/2025 with a glucose of 12 total protein 3.9 amylase 18 LDH 1803 and pH of 8, micro negative.  Blood cultures on 5/14/2025 negative.  Pt on unasyn    Past medical hx: HTN, DLD, alcohol use disorder (quit drinking 3 weeks prior to admission)    5/16: cxr shows persistent right sided loculated effusion    Wbc normal now, afebrile and down to 1 l nc 92% sats    5/16 cxr      Pt felt great after the thoracentesis but now he feels he is catching his breath again  Code Status  Full Code    Review of Systems  Review of Systems   Constitutional:  Positive for malaise/fatigue.   HENT: Negative.     Eyes: Negative.    Respiratory:  Positive for cough and shortness of breath.    Cardiovascular: Negative.    Gastrointestinal: Negative.    Genitourinary: Negative.    Musculoskeletal: Negative.    Skin: Negative.    Neurological:  Positive for weakness.   Endo/Heme/Allergies: Negative.    Psychiatric/Behavioral: Negative.         Past Medical History   has no past medical history on file.    Surgical History   has no past surgical history on file.    Family History  family history is not on file.    Social History   reports that he has never smoked. He has never used smokeless tobacco. He reports current alcohol use. He reports current drug use.    Medications  Home Medications       Reviewed by Stuart  Carol Whitlock (Pharmacy Tech) on 05/14/25 at 1808  Med List Status: Complete     Medication Last Dose Status   amLODIPine (NORVASC) 10 MG Tab 5/14/2025 Active   aspirin (ASA) 325 MG Tab 5/14/2025 Active   benzonatate (TESSALON) 100 MG Cap 5/14/2025 Active   cyclobenzaprine (FLEXERIL) 10 MG Tab 5/14/2025 Active   hydroCHLOROthiazide 25 MG Tab 5/14/2025 Active   ibuprofen (MOTRIN) 200 MG Tab 5/14/2025 Active   losartan (COZAAR) 100 MG Tab 5/14/2025 Active                  Audit from Redirected Encounters    **Home medications have not yet been reviewed for this encounter**       Current Medications[1]    Allergies  Allergies[2]    Vital Signs last 24 hours  Temp:  [36.3 °C (97.3 °F)-37.1 °C (98.8 °F)] 36.8 °C (98.2 °F)  Pulse:  [] 108  Resp:  [18-20] 20  BP: (101-126)/(63-85) 126/85  SpO2:  [92 %-95 %] 92 %    Physical Exam  Physical Exam  HENT:      Head: Normocephalic and atraumatic.      Mouth/Throat:      Mouth: Mucous membranes are moist.   Eyes:      Extraocular Movements: Extraocular movements intact.      Pupils: Pupils are equal, round, and reactive to light.   Cardiovascular:      Rate and Rhythm: Normal rate.   Pulmonary:      Effort: Pulmonary effort is normal.      Comments: Diminished 2/3 post lung on the right rest is CTA  Musculoskeletal:         General: Normal range of motion.      Cervical back: Normal range of motion.   Skin:     General: Skin is warm and dry.   Neurological:      General: No focal deficit present.      Mental Status: He is alert and oriented to person, place, and time.   Psychiatric:         Mood and Affect: Mood normal.         Behavior: Behavior normal.         Thought Content: Thought content normal.         Judgment: Judgment normal.         Fluids    Intake/Output Summary (Last 24 hours) at 5/17/2025 1443  Last data filed at 5/16/2025 1900  Gross per 24 hour   Intake 800 ml   Output --   Net 800 ml       Laboratory  Recent Results (from the past 48 hours)   CBC with  Differential    Collection Time: 05/16/25  6:24 AM   Result Value Ref Range    WBC 10.3 4.8 - 10.8 K/uL    RBC 4.78 4.70 - 6.10 M/uL    Hemoglobin 14.2 14.0 - 18.0 g/dL    Hematocrit 45.0 42.0 - 52.0 %    MCV 94.1 81.4 - 97.8 fL    MCH 29.7 27.0 - 33.0 pg    MCHC 31.6 (L) 32.3 - 36.5 g/dL    RDW 42.8 35.9 - 50.0 fL    Platelet Count 280 164 - 446 K/uL    MPV 9.4 9.0 - 12.9 fL    Neutrophils-Polys 77.30 (H) 44.00 - 72.00 %    Lymphocytes 9.60 (L) 22.00 - 41.00 %    Monocytes 10.00 0.00 - 13.40 %    Eosinophils 2.10 0.00 - 6.90 %    Basophils 0.50 0.00 - 1.80 %    Immature Granulocytes 0.50 0.00 - 0.90 %    Nucleated RBC 0.00 0.00 - 0.20 /100 WBC    Neutrophils (Absolute) 7.98 (H) 1.82 - 7.42 K/uL    Lymphs (Absolute) 0.99 (L) 1.00 - 4.80 K/uL    Monos (Absolute) 1.03 (H) 0.00 - 0.85 K/uL    Eos (Absolute) 0.22 0.00 - 0.51 K/uL    Baso (Absolute) 0.05 0.00 - 0.12 K/uL    Immature Granulocytes (abs) 0.05 0.00 - 0.11 K/uL    NRBC (Absolute) 0.00 K/uL   Comp Metabolic Panel (CMP)    Collection Time: 05/16/25  6:24 AM   Result Value Ref Range    Sodium 132 (L) 135 - 145 mmol/L    Potassium 4.5 3.6 - 5.5 mmol/L    Chloride 101 96 - 112 mmol/L    Co2 21 20 - 33 mmol/L    Anion Gap 10.0 7.0 - 16.0    Glucose 90 65 - 99 mg/dL    Bun 15 8 - 22 mg/dL    Creatinine 0.82 0.50 - 1.40 mg/dL    Calcium 8.6 8.5 - 10.5 mg/dL    Correct Calcium 9.7 8.5 - 10.5 mg/dL    AST(SGOT) 36 12 - 45 U/L    ALT(SGPT) 17 2 - 50 U/L    Alkaline Phosphatase 209 (H) 30 - 99 U/L    Total Bilirubin 0.6 0.1 - 1.5 mg/dL    Albumin 2.6 (L) 3.2 - 4.9 g/dL    Total Protein 6.3 6.0 - 8.2 g/dL    Globulin 3.7 (H) 1.9 - 3.5 g/dL    A-G Ratio 0.7 g/dL   Magnesium    Collection Time: 05/16/25  6:24 AM   Result Value Ref Range    Magnesium 2.0 1.5 - 2.5 mg/dL   ESTIMATED GFR    Collection Time: 05/16/25  6:24 AM   Result Value Ref Range    GFR (CKD-EPI) 100 >60 mL/min/1.73 m 2   FLUID CELL COUNT    Collection Time: 05/16/25 12:23 PM   Result Value Ref Range     Fluid Type Pleural     Color-Body Fluid Yellow     Character-Body Fluid Hazy     Total RBC Count 9000 cells/uL    FL Total Nucleated Cells 7482 cells/uL    Polys 95 %    Fl Mono Macrophages 5 %   FLUID AMYLASE    Collection Time: 05/16/25 12:23 PM   Result Value Ref Range    Fluid Type Pleural     Body Fluid Amylase 18 U/L   FLUID GLUCOSE    Collection Time: 05/16/25 12:23 PM   Result Value Ref Range    Glucose, Fluid 12 mg/dL   FLUID LDH    Collection Time: 05/16/25 12:23 PM   Result Value Ref Range    Body Fluid LDH 1803 U/L   Fluid pH    Collection Time: 05/16/25 12:23 PM   Result Value Ref Range    Fluid Type Pleural     Ph Misc 8.00    FLUID TOTAL PROTEIN    Collection Time: 05/16/25 12:23 PM   Result Value Ref Range    Total Protein Fluid 3.9 g/dL   Cytology    Collection Time: 05/16/25 12:23 PM   Result Value Ref Range    Cytology Reg See Path Report    Fungal Culture    Collection Time: 05/16/25 12:23 PM    Specimen: Body Fluid   Result Value Ref Range    Significant Indicator NEG     Source BF     Site Thoracentesis Fluid     Culture Result Culture in progress.     Fungal Smear Results No fungal elements seen.    FLUID CULTURE W/GRAM STAIN    Collection Time: 05/16/25 12:23 PM    Specimen: Body Fluid   Result Value Ref Range    Significant Indicator NEG     Source BF     Site Thoracentesis Fluid     Culture Result -     Gram Stain Result Moderate WBCs.  No organisms seen.      GRAM STAIN    Collection Time: 05/16/25 12:23 PM    Specimen: Body Fluid   Result Value Ref Range    Significant Indicator .     Source BF     Site Thoracentesis Fluid     Gram Stain Result Moderate WBCs.  No organisms seen.      Fungal Smear    Collection Time: 05/16/25 12:23 PM    Specimen: Body Fluid   Result Value Ref Range    Significant Indicator NEG     Source BF     Site Thoracentesis Fluid     Fungal Smear Results No fungal elements seen.    CBC with Differential    Collection Time: 05/17/25  2:52 AM   Result Value Ref Range     WBC 8.5 4.8 - 10.8 K/uL    RBC 4.01 (L) 4.70 - 6.10 M/uL    Hemoglobin 11.8 (L) 14.0 - 18.0 g/dL    Hematocrit 36.3 (L) 42.0 - 52.0 %    MCV 90.5 81.4 - 97.8 fL    MCH 29.4 27.0 - 33.0 pg    MCHC 32.5 32.3 - 36.5 g/dL    RDW 41.2 35.9 - 50.0 fL    Platelet Count 361 164 - 446 K/uL    MPV 9.1 9.0 - 12.9 fL    Neutrophils-Polys 76.90 (H) 44.00 - 72.00 %    Lymphocytes 12.10 (L) 22.00 - 41.00 %    Monocytes 7.40 0.00 - 13.40 %    Eosinophils 2.60 0.00 - 6.90 %    Basophils 0.40 0.00 - 1.80 %    Immature Granulocytes 0.60 0.00 - 0.90 %    Nucleated RBC 0.00 0.00 - 0.20 /100 WBC    Neutrophils (Absolute) 6.54 1.82 - 7.42 K/uL    Lymphs (Absolute) 1.03 1.00 - 4.80 K/uL    Monos (Absolute) 0.63 0.00 - 0.85 K/uL    Eos (Absolute) 0.22 0.00 - 0.51 K/uL    Baso (Absolute) 0.03 0.00 - 0.12 K/uL    Immature Granulocytes (abs) 0.05 0.00 - 0.11 K/uL    NRBC (Absolute) 0.00 K/uL   Comp Metabolic Panel (CMP)    Collection Time: 05/17/25  2:52 AM   Result Value Ref Range    Sodium 136 135 - 145 mmol/L    Potassium 3.5 (L) 3.6 - 5.5 mmol/L    Chloride 99 96 - 112 mmol/L    Co2 26 20 - 33 mmol/L    Anion Gap 11.0 7.0 - 16.0    Glucose 100 (H) 65 - 99 mg/dL    Bun 14 8 - 22 mg/dL    Creatinine 0.83 0.50 - 1.40 mg/dL    Calcium 8.4 (L) 8.5 - 10.5 mg/dL    Correct Calcium 9.3 8.5 - 10.5 mg/dL    AST(SGOT) 14 12 - 45 U/L    ALT(SGPT) 18 2 - 50 U/L    Alkaline Phosphatase 96 30 - 99 U/L    Total Bilirubin 0.6 0.1 - 1.5 mg/dL    Albumin 2.9 (L) 3.2 - 4.9 g/dL    Total Protein 6.1 6.0 - 8.2 g/dL    Globulin 3.2 1.9 - 3.5 g/dL    A-G Ratio 0.9 g/dL   Magnesium    Collection Time: 05/17/25  2:52 AM   Result Value Ref Range    Magnesium 1.9 1.5 - 2.5 mg/dL   ESTIMATED GFR    Collection Time: 05/17/25  2:52 AM   Result Value Ref Range    GFR (CKD-EPI) 99 >60 mL/min/1.73 m 2          Assessment/Plan  #loculated right pleural effusion  Small with associated atelectasis  Objectively improved but subjectively he feels worse from 5/16  intially  unable to tap as too small  Fluid removed 5/16/2025 (Glucose 12, TP 3.9, LDH 1803_ == infected and exudative  To be cautious will repeat Ct and see if worsening effusion if so then a chest tube is recommended    DR. Martinez to follow in am    D/w Dr. Huston           [1]   Current Facility-Administered Medications   Medication Dose Route Frequency Provider Last Rate Last Admin    guaiFENesin ER (Mucinex) tablet 600 mg  600 mg Oral Q12HRS PRN Lilian Green P.A.-CKong   600 mg at 05/17/25 0544    diazePAM (Valium) tablet 5 mg  5 mg Oral Q8HRS PRN Johnnie Huston M.D.   5 mg at 05/17/25 1241    enoxaparin (Lovenox) inj 40 mg  40 mg Subcutaneous DAILY AT 1800 Nigel Terry M.D.   40 mg at 05/16/25 1754    metoprolol tartrate (Lopressor) tablet 12.5 mg  12.5 mg Oral BID Nigel Terry M.D.   12.5 mg at 05/17/25 0458    norepinephrine (Levophed) 8 mg in 250 mL NS infusion (premix)  0-1 mcg/kg/min (Ideal) Intravenous Continuous Cem Hutton M.D.   Stopped at 05/15/25 0557    senna-docusate (Pericolace Or Senokot S) 8.6-50 MG per tablet 2 Tablet  2 Tablet Oral Q EVENING Radha Art M.D.   2 Tablet at 05/16/25 1754    And    polyethylene glycol/lytes (Miralax) Packet 1 Packet  1 Packet Oral QDAY PRN Radha Art M.D.        ondansetron (Zofran) syringe/vial injection 4 mg  4 mg Intravenous Q4HRS PRN Radha Art M.D.        ondansetron (Zofran ODT) dispertab 4 mg  4 mg Oral Q4HRS PRN Radha Art M.D.        promethazine (Phenergan) tablet 12.5-25 mg  12.5-25 mg Oral Q4HRS PRN Radha Art M.D.        acetaminophen (Tylenol) tablet 1,000 mg  1,000 mg Oral Q6HRS Radha Art M.D.   1,000 mg at 05/17/25 1240    Followed by    [START ON 5/19/2025] acetaminophen (Tylenol) tablet 1,000 mg  1,000 mg Oral Q6HRS PRBHUMIKA Art M.D.        oxyCODONE immediate-release (Roxicodone) tablet 2.5 mg  2.5 mg Oral Q3HRS PRN Radha Art M.D.   2.5 mg at 05/15/25 0933    Or    oxyCODONE  immediate-release (Roxicodone) tablet 5 mg  5 mg Oral Q3HRS PRN Radha Art M.D.   5 mg at 05/17/25 0941    Or    HYDROmorphone (Dilaudid) injection 0.25 mg  0.25 mg Intravenous Q3HRS PRN Radha Art M.D.        ampicillin/sulbactam (Unasyn) 3 g in  mL IVPB  3 g Intravenous Q6HRS Nigel Terry M.D.   Stopped at 05/17/25 1315   [2] No Known Allergies

## 2025-05-17 NOTE — ASSESSMENT & PLAN NOTE
Concern of recent URI and probable post obstructive pneumonia.  Thoracentesis with 500cc removed  Follow up on pleural effusion evaluation  Titrate oxygen  Mobilize  Antitussives as need

## 2025-05-18 LAB
ALBUMIN SERPL BCP-MCNC: 2.7 G/DL (ref 3.2–4.9)
ALBUMIN/GLOB SERPL: 0.8 G/DL
ALP SERPL-CCNC: 202 U/L (ref 30–99)
ALT SERPL-CCNC: 28 U/L (ref 2–50)
ANION GAP SERPL CALC-SCNC: 11 MMOL/L (ref 7–16)
AST SERPL-CCNC: 30 U/L (ref 12–45)
BASOPHILS # BLD AUTO: 0.3 % (ref 0–1.8)
BASOPHILS # BLD: 0.03 K/UL (ref 0–0.12)
BILIRUB SERPL-MCNC: 0.7 MG/DL (ref 0.1–1.5)
BUN SERPL-MCNC: 14 MG/DL (ref 8–22)
CALCIUM ALBUM COR SERPL-MCNC: 9.2 MG/DL (ref 8.5–10.5)
CALCIUM SERPL-MCNC: 8.2 MG/DL (ref 8.5–10.5)
CHLORIDE SERPL-SCNC: 101 MMOL/L (ref 96–112)
CO2 SERPL-SCNC: 25 MMOL/L (ref 20–33)
CREAT SERPL-MCNC: 0.72 MG/DL (ref 0.5–1.4)
EOSINOPHIL # BLD AUTO: 0.15 K/UL (ref 0–0.51)
EOSINOPHIL NFR BLD: 1.6 % (ref 0–6.9)
ERYTHROCYTE [DISTWIDTH] IN BLOOD BY AUTOMATED COUNT: 41.3 FL (ref 35.9–50)
ERYTHROCYTE [DISTWIDTH] IN BLOOD BY AUTOMATED COUNT: 41.3 FL (ref 35.9–50)
GFR SERPLBLD CREATININE-BSD FMLA CKD-EPI: 104 ML/MIN/1.73 M 2
GLOBULIN SER CALC-MCNC: 3.3 G/DL (ref 1.9–3.5)
GLUCOSE SERPL-MCNC: 110 MG/DL (ref 65–99)
HCT VFR BLD AUTO: 35 % (ref 42–52)
HCT VFR BLD AUTO: 35 % (ref 42–52)
HGB BLD-MCNC: 12 G/DL (ref 14–18)
HGB BLD-MCNC: 12 G/DL (ref 14–18)
IMM GRANULOCYTES # BLD AUTO: 0.07 K/UL (ref 0–0.11)
IMM GRANULOCYTES NFR BLD AUTO: 0.8 % (ref 0–0.9)
LYMPHOCYTES # BLD AUTO: 0.84 K/UL (ref 1–4.8)
LYMPHOCYTES NFR BLD: 9.2 % (ref 22–41)
MAGNESIUM SERPL-MCNC: 1.9 MG/DL (ref 1.5–2.5)
MCH RBC QN AUTO: 30.1 PG (ref 27–33)
MCH RBC QN AUTO: 30.1 PG (ref 27–33)
MCHC RBC AUTO-ENTMCNC: 34.3 G/DL (ref 32.3–36.5)
MCHC RBC AUTO-ENTMCNC: 34.3 G/DL (ref 32.3–36.5)
MCV RBC AUTO: 87.7 FL (ref 81.4–97.8)
MCV RBC AUTO: 87.7 FL (ref 81.4–97.8)
MONOCYTES # BLD AUTO: 0.94 K/UL (ref 0–0.85)
MONOCYTES NFR BLD AUTO: 10.3 % (ref 0–13.4)
NEUTROPHILS # BLD AUTO: 7.09 K/UL (ref 1.82–7.42)
NEUTROPHILS NFR BLD: 77.8 % (ref 44–72)
NRBC # BLD AUTO: 0 K/UL
NRBC BLD-RTO: 0 /100 WBC (ref 0–0.2)
PHOSPHATE SERPL-MCNC: 3.2 MG/DL (ref 2.5–4.5)
PLATELET # BLD AUTO: 381 K/UL (ref 164–446)
PLATELET # BLD AUTO: 381 K/UL (ref 164–446)
PMV BLD AUTO: 9.3 FL (ref 9–12.9)
PMV BLD AUTO: 9.3 FL (ref 9–12.9)
POTASSIUM SERPL-SCNC: 3.5 MMOL/L (ref 3.6–5.5)
PRELIMINARY RPT Q0601: NORMAL
PROT SERPL-MCNC: 6 G/DL (ref 6–8.2)
RBC # BLD AUTO: 3.99 M/UL (ref 4.7–6.1)
RBC # BLD AUTO: 3.99 M/UL (ref 4.7–6.1)
RHODAMINE-AURAMINE STN SPEC: NORMAL
SODIUM SERPL-SCNC: 137 MMOL/L (ref 135–145)
WBC # BLD AUTO: 9.1 K/UL (ref 4.8–10.8)
WBC # BLD AUTO: 9.1 K/UL (ref 4.8–10.8)

## 2025-05-18 PROCEDURE — 700105 HCHG RX REV CODE 258: Performed by: INTERNAL MEDICINE

## 2025-05-18 PROCEDURE — 85025 COMPLETE CBC W/AUTO DIFF WBC: CPT

## 2025-05-18 PROCEDURE — A9270 NON-COVERED ITEM OR SERVICE: HCPCS | Performed by: INTERNAL MEDICINE

## 2025-05-18 PROCEDURE — A9270 NON-COVERED ITEM OR SERVICE: HCPCS

## 2025-05-18 PROCEDURE — 83735 ASSAY OF MAGNESIUM: CPT

## 2025-05-18 PROCEDURE — A9270 NON-COVERED ITEM OR SERVICE: HCPCS | Performed by: PHYSICIAN ASSISTANT

## 2025-05-18 PROCEDURE — 36415 COLL VENOUS BLD VENIPUNCTURE: CPT

## 2025-05-18 PROCEDURE — 700102 HCHG RX REV CODE 250 W/ 637 OVERRIDE(OP): Performed by: INTERNAL MEDICINE

## 2025-05-18 PROCEDURE — 87040 BLOOD CULTURE FOR BACTERIA: CPT

## 2025-05-18 PROCEDURE — 99233 SBSQ HOSP IP/OBS HIGH 50: CPT | Performed by: INTERNAL MEDICINE

## 2025-05-18 PROCEDURE — 770006 HCHG ROOM/CARE - MED/SURG/GYN SEMI*

## 2025-05-18 PROCEDURE — 84100 ASSAY OF PHOSPHORUS: CPT

## 2025-05-18 PROCEDURE — 700102 HCHG RX REV CODE 250 W/ 637 OVERRIDE(OP)

## 2025-05-18 PROCEDURE — 80053 COMPREHEN METABOLIC PANEL: CPT

## 2025-05-18 PROCEDURE — 700111 HCHG RX REV CODE 636 W/ 250 OVERRIDE (IP): Mod: JZ | Performed by: INTERNAL MEDICINE

## 2025-05-18 PROCEDURE — 700102 HCHG RX REV CODE 250 W/ 637 OVERRIDE(OP): Performed by: PHYSICIAN ASSISTANT

## 2025-05-18 RX ORDER — SODIUM CHLORIDE 9 MG/ML
INJECTION, SOLUTION INTRAVENOUS CONTINUOUS
Status: DISCONTINUED | OUTPATIENT
Start: 2025-05-18 | End: 2025-05-27 | Stop reason: HOSPADM

## 2025-05-18 RX ADMIN — AMPICILLIN SODIUM, SULBACTAM SODIUM 3 G: 2; 1 INJECTION, POWDER, FOR SOLUTION INTRAMUSCULAR; INTRAVENOUS at 13:23

## 2025-05-18 RX ADMIN — ACETAMINOPHEN 1000 MG: 500 TABLET ORAL at 17:26

## 2025-05-18 RX ADMIN — METOPROLOL TARTRATE 25 MG: 25 TABLET, FILM COATED ORAL at 17:26

## 2025-05-18 RX ADMIN — OXYCODONE 5 MG: 5 TABLET ORAL at 13:20

## 2025-05-18 RX ADMIN — ACETAMINOPHEN 1000 MG: 500 TABLET ORAL at 05:43

## 2025-05-18 RX ADMIN — AMPICILLIN SODIUM, SULBACTAM SODIUM 3 G: 2; 1 INJECTION, POWDER, FOR SOLUTION INTRAMUSCULAR; INTRAVENOUS at 17:33

## 2025-05-18 RX ADMIN — GUAIFENESIN 600 MG: 600 TABLET, EXTENDED RELEASE ORAL at 13:21

## 2025-05-18 RX ADMIN — OXYCODONE 5 MG: 5 TABLET ORAL at 17:29

## 2025-05-18 RX ADMIN — DIAZEPAM 5 MG: 5 TABLET ORAL at 09:46

## 2025-05-18 RX ADMIN — ACETAMINOPHEN 1000 MG: 500 TABLET ORAL at 13:17

## 2025-05-18 RX ADMIN — AMPICILLIN SODIUM, SULBACTAM SODIUM 3 G: 2; 1 INJECTION, POWDER, FOR SOLUTION INTRAMUSCULAR; INTRAVENOUS at 05:47

## 2025-05-18 RX ADMIN — METOPROLOL TARTRATE 25 MG: 25 TABLET, FILM COATED ORAL at 05:43

## 2025-05-18 ASSESSMENT — PAIN DESCRIPTION - PAIN TYPE
TYPE: ACUTE PAIN

## 2025-05-18 ASSESSMENT — ENCOUNTER SYMPTOMS
SHORTNESS OF BREATH: 1
FLANK PAIN: 1

## 2025-05-18 ASSESSMENT — FIBROSIS 4 INDEX
FIB4 SCORE: 0.91
FIB4 SCORE: 0.91

## 2025-05-18 NOTE — CARE PLAN
The patient is Watcher - Medium risk of patient condition declining or worsening    Shift Goals  Clinical Goals: CT at 2200  Patient Goals: Rest; CT results  Family Goals: scot    Progress made toward(s) clinical / shift goals:    Problem: Knowledge Deficit - Standard  Goal: Patient and family/care givers will demonstrate understanding of plan of care, disease process/condition, diagnostic tests and medications  Outcome: Progressing     Problem: Pain - Standard  Goal: Alleviation of pain or a reduction in pain to the patient’s comfort goal  Outcome: Progressing     Problem: Hemodynamics  Goal: Patient's hemodynamics, fluid balance and neurologic status will be stable or improve  Outcome: Progressing     Problem: Respiratory  Goal: Patient will achieve/maintain optimum respiratory ventilation and gas exchange  Outcome: Progressing       Patient is not progressing towards the following goals:

## 2025-05-18 NOTE — PROGRESS NOTES
Bedside report received from off going RN/tech: chelsey Rogers care of patient.     Fall Risk Score: LOW RISK  Fall risk interventions in place: Provide patient/family education based on risk assessment  Bed type: Regular (Earl Score less than 17 interventions in place)  Patient on cardiac monitor: Yes  IVF/IV medications: Not Applicable   Oxygen: How many liters 2L, Traced the line to wall oxygen, and No oxygen tank in room  Bedside sitter: Not Applicable   Isolation: Not applicable

## 2025-05-18 NOTE — PROGRESS NOTES
Bedside report completed with AM RN at 1900. Patient sitting up in chair reporting 0/10 pain. Patient Aox4. POC discussed with patient. Call light within reach. Patient Q2H turns not indicated; patient turns self in bed. Bed alarm not indicated; patient LOW fall risk.

## 2025-05-18 NOTE — PROGRESS NOTES
Hospital Medicine Daily Progress Note    Date of Service  5/18/2025    Chief Complaint  Marlo Gusman is a 60 y.o. male admitted 5/14/2025 with Flank Pain (Right sided flank pain x 5 days, pt denies dysuria but reports pain is darker than normal. +nausea. )        Hospital Course  No notes on file    Interval Problem Update  Patient seen and examine at bedside  Medically cleared: No ,  pleural eff too small to tap however repeat CT shows increased Dr. Martinez Pulmonology recommends IR piggyback catheter/tube plan for TOMORROW. Thora cultures and patholgy PENDING  Pending:  Estimated Discharge Day:  Disposition:     Managing hypoxia/prob postobstructive pneumonia/R pleural eff can't r/o mass/LLL lung mass, QUE and Aib with RVR. He denies smoking but uses alcohol and illicit drugs. 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 He has a history of recent URI, hypertension. Presented with flu like symptoms, dyspnea. Denies inintended weight loss.   S/p 5/16 thoracentesis moderate WBCs no organisms on Gram. Cultures PENDING pathology PENDING. 5/14 blood cx NGTD. Discussed with Pulmonology. Repeat CT ordered showed incresasing R pleural effusion. Ordered IR piggyback catheter, discussed with IR plan for TOMORROW.     97% on 2LO2  CHADSVasc 1 and male and less than 66yo. Among the risk factors with a one-point value, age 65 to 74 years and the presence of heart failure have the greatest effect on thromboembolic risk and OAC is recommended in patients with either of these risk factors. In his case no indication for anticoag for now.   Still fatigued and dyspneic but ambulated around the room no pronblems, would like to mobilize more.      I reviewed the chart along with vitals, labs, imaging, test (both pending and resulted) and recommendations from specialists and interdisciplinary team.  I have discussed this patient's plan of care and discharge plan at IDT rounds today with Case Management, Nursing, Nursing  leadership, and other members of the IDT team.      Consultants/Specialty  Intensivist admitted  Hospitalist  Pulmonology    Code Status  Full Code    Disposition  Medically Cleared  I have placed the appropriate orders for post-discharge needs.    Review of Systems  Review of Systems   Constitutional:  Positive for malaise/fatigue.   Respiratory:  Positive for shortness of breath.    Genitourinary:  Positive for flank pain.        Physical Exam  Temp:  [36.2 °C (97.2 °F)-39.1 °C (102.3 °F)] 39.1 °C (102.3 °F)  Pulse:  [] 123  Resp:  [18-22] 22  BP: (112-133)/(83-91) 120/85  SpO2:  [92 %-97 %] 97 %    Physical Exam  Vitals and nursing note reviewed.   Constitutional:       Appearance: He is obese.   HENT:      Head: Normocephalic and atraumatic.      Right Ear: External ear normal.      Left Ear: External ear normal.      Nose: Nose normal.      Mouth/Throat:      Mouth: Mucous membranes are moist.   Eyes:      General: No scleral icterus.     Conjunctiva/sclera: Conjunctivae normal.   Cardiovascular:      Rate and Rhythm: Normal rate and regular rhythm.      Heart sounds: No murmur heard.     No friction rub. No gallop.   Pulmonary:      Effort: Pulmonary effort is normal.      Breath sounds: Normal breath sounds.      Comments: Decreased breath sounds  Abdominal:      General: Abdomen is flat. Bowel sounds are normal. There is no distension.      Palpations: Abdomen is soft.      Tenderness: There is no abdominal tenderness. There is no guarding.   Musculoskeletal:         General: Normal range of motion.      Cervical back: Normal range of motion and neck supple.   Skin:     General: Skin is warm.   Neurological:      Mental Status: He is alert and oriented to person, place, and time. Mental status is at baseline.   Psychiatric:         Mood and Affect: Mood normal.         Behavior: Behavior normal.         Thought Content: Thought content normal.         Judgment: Judgment normal.          Fluids    Intake/Output Summary (Last 24 hours) at 5/18/2025 1349  Last data filed at 5/18/2025 0937  Gross per 24 hour   Intake 600 ml   Output --   Net 600 ml        Laboratory  Recent Labs     05/16/25 0624 05/17/25  0252 05/18/25  0000   WBC 10.3 8.5 9.1  9.1   RBC 4.78 4.01* 3.99*  3.99*   HEMOGLOBIN 14.2 11.8* 12.0*  12.0*   HEMATOCRIT 45.0 36.3* 35.0*  35.0*   MCV 94.1 90.5 87.7  87.7   MCH 29.7 29.4 30.1  30.1   MCHC 31.6* 32.5 34.3  34.3   RDW 42.8 41.2 41.3  41.3   PLATELETCT 280 361 381  381   MPV 9.4 9.1 9.3  9.3     Recent Labs     05/16/25 0624 05/17/25  0252 05/18/25  0000   SODIUM 132* 136 137   POTASSIUM 4.5 3.5* 3.5*   CHLORIDE 101 99 101   CO2 21 26 25   GLUCOSE 90 100* 110*   BUN 15 14 14   CREATININE 0.82 0.83 0.72   CALCIUM 8.6 8.4* 8.2*                     Imaging  CT-CHEST (THORAX) W/O   Final Result      1.  Loculated right pleural effusion has increased from the prior CT. Adjacent airspace opacification may represent atelectasis or pneumonia.   2.  Small left pleural effusion with adjacent compressive atelectasis.   3.  Mild mediastinal adenopathy is likely reactive.         DX-CHEST-PORTABLE (1 VIEW)   Final Result      1.  Persistent right pleural effusion and associated atelectasis/consolidation.   2.  No pneumothorax status post right-sided thoracentesis.      US-THORACENTESIS PUNCTURE RIGHT   Final Result      1. Ultrasound guided right sided diagnostic and therapeutic thoracentesis.      2. 500 mL of fluid withdrawn.      DX-CHEST-PORTABLE (1 VIEW)   Final Result         1. Moderate right pleural effusion and atelectasis unchanged.                  EC-ECHOCARDIOGRAM COMPLETE W/O CONT   Final Result      DX-CHEST-PORTABLE (1 VIEW)   Final Result      1.  Hypoinflation.   2.  Moderate right pleural effusion and underlying atelectasis or consolidation. As previously mentioned, possible underlying pneumonia.   3.  Minimal left pleural effusion and subsegmental  atelectatic change.      DX-CHEST-LIMITED (1 VIEW)   Final Result      Airspace opacity in the right lower lobe, concerning for pneumonia.      CT-CTA COMPLETE THORACOABDOMINAL AORTA   Final Result      1.  No evidence for aortic aneurysm or dissection.   2.  Loculated right pleural effusion with associated compressive atelectasis and consolidation.   3.  Masslike area of consolidation in the left posterior medial costophrenic angle. This may be due to infection or neoplasm. Clinical correlation and follow-up is needed.   4.  Hepatomegaly with fatty infiltration of the liver.                        IR-CONSULT AND TREAT    (Results Pending)        Assessment/Plan  * Atrial fibrillation with RVR (HCC)- (present on admission)  Assessment & Plan  Resolve of RVR  Monitor on telemetry  Metoprolol 12.5mg BID  Vitals:    05/18/25 1320   BP:    Pulse:    Resp: (!) 22   Temp:    SpO2:      Inc BB    Hypertension  Assessment & Plan  Metoprolol.  Hold outpatientHCTZrestart losartan and amlodipine as BP dictate/allows  Vitals:    05/17/25 1232   BP: 126/85   Pulse: (!) 108   Resp: 20   Temp: 36.8 °C (98.2 °F)   SpO2: 92%     Stable.    Acute hypoxic respiratory failure (HCC)- (present on admission)  Assessment & Plan  Concern of recent URI and probable post obstructive pneumonia.  Thoracentesis with 500cc removed  Follow up on pleural effusion evaluation  Titrate oxygen  Mobilize  Antitussives as need    Mass of lower lobe of left lung- (present on admission)  Assessment & Plan  Mass vs consolidation  Follow up on pleural effusion cytology  May need future follow up CT and or biopsy.  Treating as infection at this time.    Pulmonology following    High anion gap metabolic acidosis- (present on admission)  Assessment & Plan  resolve    QUE (acute kidney injury) (HCC)- (present on admission)  Assessment & Plan  Suspected prerenal and hypovolemia  Improved with IV fluids and resolve of Afib rvr  Monitor on tele  Recent Labs      05/16/25  0624 05/17/25  0252 05/18/25  0000   SODIUM 132* 136 137   POTASSIUM 4.5 3.5* 3.5*   CHLORIDE 101 99 101   CO2 21 26 25   GLUCOSE 90 100* 110*   BUN 15 14 14   CREATININE 0.82 0.83 0.72         Pleural effusion on right- (present on admission)  Assessment & Plan  Had a thoracentesis.  F/U post thoracentesis xray still with opacification of right lower lung    Follow cultures  Pulmnology following  CT showed worsening  IR guided piggy back catheter planned    Sepsis (HCC)- (present on admission)  Assessment & Plan  S/p pressors and has had IV fluid resuscitation  Monitor I/O's, labs, and vitals.    Follow pleural fluid cultures         VTE prophylaxis: VTE Selection  Lovenox ppx  I have performed a physical exam and reviewed and updated ROS and Plan today (5/18/2025). In review of yesterday's note (5/17/2025), there are no changes except as documented above.    Patient is has a high medical complexity, complex decision making and is at high risk for complication, morbidity, and mortality, thus requiring the highest level of my preparedness for sudden, emergent intervention. Medical decision making is therefore complex. I provided  services, which included ordering labs and/or imaging, and discussing the case with various consultants.medication orders, frequent reevaluations of the patient's condition and response to treatment. Time was also devoted to counseling and coordinating care including review of records, pertinent lab data and studies, as well as discussing diagnostic evaluation and work up, planned therapeutic interventions and future disposition of care. Where indicated, the assessment and plan reflect discussion of patient with consultants, other healthcare providers, family members, and additional research needed to obtain further information in formulating the plan of care for Marlo Gusman. Total time spent was 55 minutes.

## 2025-05-19 ENCOUNTER — APPOINTMENT (OUTPATIENT)
Dept: RADIOLOGY | Facility: MEDICAL CENTER | Age: 60
DRG: 871 | End: 2025-05-19
Attending: INTERNAL MEDICINE
Payer: COMMERCIAL

## 2025-05-19 LAB
ALBUMIN SERPL BCP-MCNC: 2.7 G/DL (ref 3.2–4.9)
ALBUMIN/GLOB SERPL: 0.8 G/DL
ALP SERPL-CCNC: 157 U/L (ref 30–99)
ALT SERPL-CCNC: 25 U/L (ref 2–50)
ANION GAP SERPL CALC-SCNC: 13 MMOL/L (ref 7–16)
AST SERPL-CCNC: 20 U/L (ref 12–45)
BACTERIA BLD CULT: NORMAL
BACTERIA BLD CULT: NORMAL
BACTERIA FLD AEROBE CULT: NORMAL
BASOPHILS # BLD AUTO: 0.4 % (ref 0–1.8)
BASOPHILS # BLD: 0.03 K/UL (ref 0–0.12)
BILIRUB SERPL-MCNC: 0.5 MG/DL (ref 0.1–1.5)
BUN SERPL-MCNC: 13 MG/DL (ref 8–22)
CALCIUM ALBUM COR SERPL-MCNC: 9.2 MG/DL (ref 8.5–10.5)
CALCIUM SERPL-MCNC: 8.2 MG/DL (ref 8.5–10.5)
CHLORIDE SERPL-SCNC: 104 MMOL/L (ref 96–112)
CO2 SERPL-SCNC: 21 MMOL/L (ref 20–33)
CREAT SERPL-MCNC: 0.64 MG/DL (ref 0.5–1.4)
EOSINOPHIL # BLD AUTO: 0.12 K/UL (ref 0–0.51)
EOSINOPHIL NFR BLD: 1.4 % (ref 0–6.9)
ERYTHROCYTE [DISTWIDTH] IN BLOOD BY AUTOMATED COUNT: 42 FL (ref 35.9–50)
GFR SERPLBLD CREATININE-BSD FMLA CKD-EPI: 108 ML/MIN/1.73 M 2
GLOBULIN SER CALC-MCNC: 3.4 G/DL (ref 1.9–3.5)
GLUCOSE SERPL-MCNC: 101 MG/DL (ref 65–99)
GRAM STN SPEC: NORMAL
GRAM STN SPEC: NORMAL
HCT VFR BLD AUTO: 36.8 % (ref 42–52)
HGB BLD-MCNC: 12.5 G/DL (ref 14–18)
IMM GRANULOCYTES # BLD AUTO: 0.07 K/UL (ref 0–0.11)
IMM GRANULOCYTES NFR BLD AUTO: 0.8 % (ref 0–0.9)
LYMPHOCYTES # BLD AUTO: 0.86 K/UL (ref 1–4.8)
LYMPHOCYTES NFR BLD: 10.3 % (ref 22–41)
MAGNESIUM SERPL-MCNC: 1.9 MG/DL (ref 1.5–2.5)
MCH RBC QN AUTO: 30.2 PG (ref 27–33)
MCHC RBC AUTO-ENTMCNC: 34 G/DL (ref 32.3–36.5)
MCV RBC AUTO: 88.9 FL (ref 81.4–97.8)
MONOCYTES # BLD AUTO: 0.81 K/UL (ref 0–0.85)
MONOCYTES NFR BLD AUTO: 9.7 % (ref 0–13.4)
NEUTROPHILS # BLD AUTO: 6.5 K/UL (ref 1.82–7.42)
NEUTROPHILS NFR BLD: 77.4 % (ref 44–72)
NRBC # BLD AUTO: 0 K/UL
NRBC BLD-RTO: 0 /100 WBC (ref 0–0.2)
PHOSPHATE SERPL-MCNC: 3.4 MG/DL (ref 2.5–4.5)
PLATELET # BLD AUTO: 377 K/UL (ref 164–446)
PMV BLD AUTO: 8.7 FL (ref 9–12.9)
POTASSIUM SERPL-SCNC: 3.8 MMOL/L (ref 3.6–5.5)
PROT SERPL-MCNC: 6.1 G/DL (ref 6–8.2)
RBC # BLD AUTO: 4.14 M/UL (ref 4.7–6.1)
SIGNIFICANT IND 70042: NORMAL
SITE SITE: NORMAL
SODIUM SERPL-SCNC: 138 MMOL/L (ref 135–145)
SOURCE SOURCE: NORMAL
WBC # BLD AUTO: 8.4 K/UL (ref 4.8–10.8)

## 2025-05-19 PROCEDURE — 700105 HCHG RX REV CODE 258: Performed by: INTERNAL MEDICINE

## 2025-05-19 PROCEDURE — 87070 CULTURE OTHR SPECIMN AEROBIC: CPT

## 2025-05-19 PROCEDURE — 84100 ASSAY OF PHOSPHORUS: CPT

## 2025-05-19 PROCEDURE — 85025 COMPLETE CBC W/AUTO DIFF WBC: CPT

## 2025-05-19 PROCEDURE — 700111 HCHG RX REV CODE 636 W/ 250 OVERRIDE (IP): Performed by: STUDENT IN AN ORGANIZED HEALTH CARE EDUCATION/TRAINING PROGRAM

## 2025-05-19 PROCEDURE — 87205 SMEAR GRAM STAIN: CPT

## 2025-05-19 PROCEDURE — 83735 ASSAY OF MAGNESIUM: CPT

## 2025-05-19 PROCEDURE — 700105 HCHG RX REV CODE 258: Performed by: FAMILY MEDICINE

## 2025-05-19 PROCEDURE — 4401649 CT-CHEST TUBE PLACEMENT (EMPYEMA AND PNEUMO)

## 2025-05-19 PROCEDURE — 0W9930Z DRAINAGE OF RIGHT PLEURAL CAVITY WITH DRAINAGE DEVICE, PERCUTANEOUS APPROACH: ICD-10-PCS | Performed by: STUDENT IN AN ORGANIZED HEALTH CARE EDUCATION/TRAINING PROGRAM

## 2025-05-19 PROCEDURE — 700111 HCHG RX REV CODE 636 W/ 250 OVERRIDE (IP): Mod: JZ | Performed by: INTERNAL MEDICINE

## 2025-05-19 PROCEDURE — 770006 HCHG ROOM/CARE - MED/SURG/GYN SEMI*

## 2025-05-19 PROCEDURE — 700111 HCHG RX REV CODE 636 W/ 250 OVERRIDE (IP): Mod: JZ | Performed by: FAMILY MEDICINE

## 2025-05-19 PROCEDURE — 700102 HCHG RX REV CODE 250 W/ 637 OVERRIDE(OP): Performed by: INTERNAL MEDICINE

## 2025-05-19 PROCEDURE — 99232 SBSQ HOSP IP/OBS MODERATE 35: CPT | Performed by: STUDENT IN AN ORGANIZED HEALTH CARE EDUCATION/TRAINING PROGRAM

## 2025-05-19 PROCEDURE — 36415 COLL VENOUS BLD VENIPUNCTURE: CPT

## 2025-05-19 PROCEDURE — 700111 HCHG RX REV CODE 636 W/ 250 OVERRIDE (IP): Mod: JZ

## 2025-05-19 PROCEDURE — 80053 COMPREHEN METABOLIC PANEL: CPT

## 2025-05-19 PROCEDURE — A9270 NON-COVERED ITEM OR SERVICE: HCPCS | Performed by: INTERNAL MEDICINE

## 2025-05-19 PROCEDURE — 700102 HCHG RX REV CODE 250 W/ 637 OVERRIDE(OP)

## 2025-05-19 PROCEDURE — A9270 NON-COVERED ITEM OR SERVICE: HCPCS

## 2025-05-19 PROCEDURE — 99232 SBSQ HOSP IP/OBS MODERATE 35: CPT | Performed by: FAMILY MEDICINE

## 2025-05-19 RX ORDER — MIDAZOLAM HYDROCHLORIDE 1 MG/ML
.5-2 INJECTION INTRAMUSCULAR; INTRAVENOUS PRN
Status: ACTIVE | OUTPATIENT
Start: 2025-05-19 | End: 2025-05-19

## 2025-05-19 RX ORDER — SODIUM CHLORIDE 9 MG/ML
500 INJECTION, SOLUTION INTRAVENOUS
Status: ACTIVE | OUTPATIENT
Start: 2025-05-19 | End: 2025-05-19

## 2025-05-19 RX ORDER — CHLORHEXIDINE GLUCONATE ORAL RINSE 1.2 MG/ML
15 SOLUTION DENTAL 2 TIMES DAILY
Status: DISCONTINUED | OUTPATIENT
Start: 2025-05-19 | End: 2025-05-19

## 2025-05-19 RX ORDER — MIDAZOLAM HYDROCHLORIDE 1 MG/ML
INJECTION INTRAMUSCULAR; INTRAVENOUS
Status: COMPLETED
Start: 2025-05-19 | End: 2025-05-19

## 2025-05-19 RX ORDER — ONDANSETRON 2 MG/ML
4 INJECTION INTRAMUSCULAR; INTRAVENOUS PRN
Status: ACTIVE | OUTPATIENT
Start: 2025-05-19 | End: 2025-05-19

## 2025-05-19 RX ADMIN — MIDAZOLAM HYDROCHLORIDE 0.5 MG: 1 INJECTION, SOLUTION INTRAMUSCULAR; INTRAVENOUS at 10:26

## 2025-05-19 RX ADMIN — FENTANYL CITRATE 50 MCG: 50 INJECTION, SOLUTION INTRAMUSCULAR; INTRAVENOUS at 10:22

## 2025-05-19 RX ADMIN — MIDAZOLAM HYDROCHLORIDE 1.5 MG: 1 INJECTION, SOLUTION INTRAMUSCULAR; INTRAVENOUS at 10:22

## 2025-05-19 RX ADMIN — METOPROLOL TARTRATE 25 MG: 25 TABLET, FILM COATED ORAL at 05:35

## 2025-05-19 RX ADMIN — AMPICILLIN AND SULBACTAM 3 G: 1; 2 INJECTION, POWDER, FOR SOLUTION INTRAMUSCULAR; INTRAVENOUS at 17:15

## 2025-05-19 RX ADMIN — ACETAMINOPHEN 1000 MG: 500 TABLET ORAL at 05:34

## 2025-05-19 RX ADMIN — SODIUM CHLORIDE: 9 INJECTION, SOLUTION INTRAVENOUS at 00:11

## 2025-05-19 RX ADMIN — AMPICILLIN SODIUM, SULBACTAM SODIUM 3 G: 2; 1 INJECTION, POWDER, FOR SOLUTION INTRAMUSCULAR; INTRAVENOUS at 05:36

## 2025-05-19 RX ADMIN — AMPICILLIN AND SULBACTAM 3 G: 1; 2 INJECTION, POWDER, FOR SOLUTION INTRAMUSCULAR; INTRAVENOUS at 21:58

## 2025-05-19 RX ADMIN — METOPROLOL TARTRATE 25 MG: 25 TABLET, FILM COATED ORAL at 17:13

## 2025-05-19 RX ADMIN — FENTANYL CITRATE 50 MCG: 50 INJECTION, SOLUTION INTRAMUSCULAR; INTRAVENOUS at 10:26

## 2025-05-19 RX ADMIN — ACETAMINOPHEN 1000 MG: 500 TABLET ORAL at 00:05

## 2025-05-19 RX ADMIN — ACETAMINOPHEN 1000 MG: 500 TABLET ORAL at 13:30

## 2025-05-19 ASSESSMENT — ENCOUNTER SYMPTOMS
MYALGIAS: 0
DEPRESSION: 0
CONSTITUTIONAL NEGATIVE: 1
FEVER: 1
VOMITING: 0
CHILLS: 0
COUGH: 0
DIZZINESS: 0
SPUTUM PRODUCTION: 0
FEVER: 0
NECK PAIN: 0
SHORTNESS OF BREATH: 1
ORTHOPNEA: 0
PALPITATIONS: 0
HEARTBURN: 0
HEMOPTYSIS: 0
NAUSEA: 0

## 2025-05-19 ASSESSMENT — PAIN DESCRIPTION - PAIN TYPE
TYPE: ACUTE PAIN
TYPE: ACUTE PAIN

## 2025-05-19 NOTE — ASSESSMENT & PLAN NOTE
Small right-sided pleural effusion associated with atelectasis unknown the cause, patient denied any benefit symptom, smoking history.  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  Initial thoracocentesis was unsuccessful but subsequently was able to get 500 cc with low glucose level  Fluid removed 5/16/2025 (Glucose 12, TP 3.9, LDH 1803_ == infected and exudative  Patient on Augmentin

## 2025-05-19 NOTE — CARE PLAN
Patient now awake. Father holding patient. Patient babbling, and smiling at this RN. This RN gave patient a baby doll. Patient playing with baby doll. This RN gave patient's mother bubbles to blow. Patient's skin is pink, warm, and dry. Respirations are easy and nonlabored.     Patient's mother and father aware that [facility] has accepted her, but there are no beds at this time.    The patient is Watcher - Medium risk of patient condition declining or worsening    Shift Goals  Clinical Goals: Monitor VS; maintain adequate oxygenation  Patient Goals: Comfort  Family Goals: PERLITA    Progress made toward(s) clinical / shift goals:      Problem: Pain - Standard  Goal: Alleviation of pain or a reduction in pain to the patient’s comfort goal  Outcome: Progressing     Problem: Respiratory  Goal: Patient will achieve/maintain optimum respiratory ventilation and gas exchange  Outcome: Progressing     Patient has maintained adequate oxygenation, of 90% or greater on 3 L throughout the night. Patient was found to have a low-grade fever upon transfer to floor, PRN tylenol and IV abx given. Patient educated on the plan to be NPO for procedure tomorrow.     Patient is not progressing towards the following goals:

## 2025-05-19 NOTE — PROGRESS NOTES
4 Eyes Skin Assessment Completed by Radha RN and PAL La.    Head WDL  Ears WDL  Nose WDL  Mouth WDL  Neck WDL  Breast/Chest WDL  Shoulder Blades WDL  Spine R. Posterior thoracentesis site, dressing CDI   (R) Arm/Elbow/Hand WDL  (L) Arm/Elbow/Hand WDL  Abdomen WDL  Groin WDL  Scrotum/Coccyx/Buttocks WDL  (R) Leg WDL  (L) Leg WDL  (R) Heel/Foot/Toe WDL  (L) Heel/Foot/Toe WDL          Devices In Places Pulse Ox      Interventions In Place NC W/Ear Foams and Pillows    Possible Skin Injury No    Pictures Uploaded Into Epic N/A  Wound Consult Placed N/A  RN Wound Prevention Protocol Ordered No

## 2025-05-19 NOTE — ASSESSMENT & PLAN NOTE
Small right-sided pleural effusion associated with atelectasis unknown the cause, patient denied any benefit symptom, smoking history.  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  Initial thoracocentesis was unsuccessful but subsequently was able to get 500 cc with low glucose level  Fluid removed 2025 (Glucose 12, TP 3.9, LDH 1803_ == infected and exudative  Intrapleural fibrinolytics with alteplase 10mg and Dnase 5 mg started on 2025  Lytic day # chest tube output around 600  Lytic day # chest tube output around 940  Today imagin.  Hazy right pulmonary infiltrates, stable  2.  Trace right pleural effusion, stable  Continue chest tube to suction  Pulmonary hygiene with OOB and early mobility  Continue with antibiotic Unasyn  Daily chest x-ray   Follow-up with cultures  Will continue follow-up

## 2025-05-19 NOTE — PROGRESS NOTES
Bedside report received from off going RN Yaa, assumed care of patient.     Fall Risk Score: LOW RISK  Fall risk interventions in place: Provide patient/family education based on risk assessment, Educate patient/family to call staff for assistance when getting out of bed, and Place fall precaution signage outside patient door  Bed type: Regular (Earl Score less than 17 interventions in place)  Patient on cardiac monitor: No   IVF/IV medications: Not Applicable   Oxygen: How many liters 3L  Bedside sitter: Not Applicable   Isolation: Not applicable

## 2025-05-19 NOTE — PROGRESS NOTES
Received report and assumed care of patient at 2240. Patient arrived to unit in a wheelchair and able to ambulate independently to the bed with no assistance. Patient is AOX4, on 3 L nasal cannula, and reporting a pain level of 0/10. Discussed plan and reinforced the need to call if needing assistance. Call light and belongings within reach and patient expresses no other needs at this time.

## 2025-05-19 NOTE — DISCHARGE PLANNING
Case Management Discharge Planning    Admission Date: 5/14/2025  GMLOS: 4.9  ALOS: 5    6-Clicks ADL Score: 21  6-Clicks Mobility Score: 23      Anticipated Discharge Dispo: Discharge Disposition: Discharged to home/self care (01)    Action(s) Taken: Chart review completed. RNCM met with patient at bedside to obtain information for assessment and discuss DCP    Escalations Completed: None    Medically Clear: No    Next Steps: CM to follow up with IDT regarding DCP needs/barriers    Barriers to Discharge: Medical clearance    Is the patient up for discharge tomorrow: No        Care Transition Team Assessment    Case management role discussed; understanding of case management role verbalized   Demographics on facesheet verified  Please see H&P for pertinent PMH and reason for admission  Patient's PCP is: PASTORA Rowland  Patient's preferred pharmacy is: [facility]  Housing: Patient lives in a 3rd story home with his spouse; [address] between floors  IADLS/ADLS: Independent at baseline  Mental Health Concerns: Denies  Substance Abuse: Patient states he has not had ETOH X 1 month; declines resources for substance cessation   Monthly Income/employment status: Question not asked/patient is employed full-time  Financial status: Denies financial concerns  DME: None at baseline  O2: None at baseline  Prior services: Home-Independent  Discharge support: Spouse  Transportation: Denies needing assistance  Discharge planning: Home with help      Information Source  Orientation Level: Oriented X4  Information Given By: Patient  Who is responsible for making decisions for patient? : Patient    Readmission Evaluation  Is this a readmission?: No    Elopement Risk  Legal Hold: No  Ambulatory or Self Mobile in Wheelchair: Yes  Disoriented: No  Psychiatric Symptoms: None  History of Wandering: No  Elopement this Admit: No  Vocalizing Wanting to Leave: No  Displays Behaviors, Body Language Wanting to Leave: No-Not at  Risk for Elopement  Elopement Risk: Not at Risk for Elopement    Interdisciplinary Discharge Planning  Does Admitting Nurse Feel This Could be a Complex Discharge?: No  Primary Care Physician: Malika Chavez. PASTORA  Lives with - Patient's Self Care Capacity: Spouse  Patient or legal guardian wants to designate a caregiver: No  Support Systems: Family Member(s), Friends / Neighbors, Spouse / Significant Other  Housing / Facility: [address]  Do You Take your Prescribed Medications Regularly: Yes  Able to Return to Previous ADL's: Yes  Mobility Issues: No  Prior Services: None, Home-Independent  Assistance Needed: No    Discharge Preparedness  What is your plan after discharge?: Home with help  What are your discharge supports?: Spouse  Prior Functional Level: Ambulatory, Drives Self, Independent with Activities of Daily Living, Independent with Medication Management  Difficulity with ADLs: None  Difficulity with IADLs: None    Functional Assesment  Prior Functional Level: Ambulatory, Drives Self, Independent with Activities of Daily Living, Independent with Medication Management    Finances  Financial Barriers to Discharge: No  Prescription Coverage: Yes    Vision / Hearing Impairment  Vision Impairment : Yes  Right Eye Vision: Impaired, Wears Glasses  Left Eye Vision: Impaired, Wears Glasses  Hearing Impairment : Yes  Hearing Impairment: Both Ears, Patient Declines to Wear Hearing Device(s)    Values / Beliefs / Concerns  Values / Beliefs Concerns : No    Advance Directive  Advance Directive?: None    Domestic Abuse  Have you ever been the victim of abuse or violence?: No  Physical Abuse or Sexual Abuse: No  Verbal Abuse or Emotional Abuse: No  Possible Abuse/Neglect Reported to:: Not Applicable    Psychological Assessment  History of Substance Abuse: Alcohol  History of Psychiatric Problems: No  Non-compliant with Treatment: No  Newly Diagnosed Illness: Yes    Discharge Risks or Barriers  Discharge risks or  barriers?: Complex medical needs  Patient risk factors: Cognitive / sensory / physical deficit, Complex medical needs, Multiple organizational systems involved, Substance abuse, Vulnerable adult    Anticipated Discharge Information  Discharge Disposition: Discharged to home/self care (01)

## 2025-05-19 NOTE — PROGRESS NOTES
Pt presents to CT4. Pt was consented by MD at bedside, confirmed by this RN. Pt transferred to CT table in prone position. Patient underwent a right chest tube placement by Dr. Gallardo. Procedure site was marked by MD and verified using imaging guidance. Pt placed on monitor, prepped and draped in a sterile fashion. Vitals were taken every 5 minutes and remained stable during procedure (see doc flow sheet for results). CO2 waveform capnography was monitored and remained WNL throughout procedure. Report called to Oralia Addison RN. Pt transported by stretcher with RN to [place] [id number].     Specimen: fluid in syringe hand delivered to lab.    Flexima APDL 10F x 25cm  REF: [id number]  LOT: [id number]  EXP: 03/12/2028

## 2025-05-19 NOTE — CARE PLAN
The patient is Stable - Low risk of patient condition declining or worsening    Shift Goals  Clinical Goals: Monitor VS; maintain adequate oxygen;  Patient Goals: Comfort; Rest  Family Goals: PERLITA    Progress made toward(s) clinical / shift goals:     Problem: Respiratory  Goal: Patient will achieve/maintain optimum respiratory ventilation and gas exchange  Outcome: Progressing  Note: Pt oxygen weaned from 3L to 2L and holding at 95%. Will continue to monitor for ability to wean.      Problem: Mobility  Goal: Risk for activity intolerance will decrease  5/19/2025 1633 by Kiara Rao R.BHUMIKA.  Outcome: Progressing  Note: Pt ambulated the unit x2 while on 3 liters of oxygen. Pt wanting to ambulate daily.  5/19/2025 1632 by Kiara Rao R.N.  Outcome: Progressing

## 2025-05-19 NOTE — HOSPITAL COURSE
Marlo Gusman is a 60 y.o. male who presented 5/14/2025 with shortness of breath and right sided flank pain x 5 days.  He has a hx of HTN, DLD.  He states he feels his flank pain was from a recent upper respiratory infection he was battling for past 2 weeks with ongoing fever, chills, and coughing.      A CT chest showed a loculated right pleural effusion.  Initially he required pressor support. He was in atrial fib with RVR and hypoxic. An initial thoracentesis was unsuccessfulHe had a thoracentesis with 500cc removed with improvement of his breathing.

## 2025-05-19 NOTE — PROGRESS NOTES
[facility] Pulmonary Medicine Daily Progress Note    Date of Service  5/19/2025    Senior Resident: Dr. David Mendoza    Contact Number: [phone]    Reason for consultation:  Loculated right side pleural effusion    Hospital Course  60 y.o. male who presented 5/14/2025 with transferred out of the ICU with sepsis 2/2 pna on the right needing pressors.  Patient presented on 5/14/2025 with shortness of breath and right-sided flank pain.  CT chest showed loculated right pleural effusion with a parapneumonic pneumonia requiring pressor support in the ICU and also had A-fib with RVR.  Initially thoracentesis was not successful but subsequently was able to get 500 cc on 5/16/2025 with a glucose of 12 total protein 3.9 amylase 18 LDH 1803 and pH of 8, micro negative.  Blood cultures on 5/14/2025 negative.  Pt on unasyn     Past medical hx: HTN, DLD, alcohol use disorder (quit drinking 3 weeks prior to admission)     5/16: cxr shows persistent right sided loculated effusion     Wbc normal now, afebrile and down to 1 l nc 92% sats      Interval Problem Update  The patient reports that he is feeling much better denies any symptom of shortness of breath and he is on 3 L oxygen  Patient denied any smoking history and that he was doing well before this incident.  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   HENT:  Negative for hearing loss.    Respiratory:  Positive for shortness of breath. Negative for cough, hemoptysis and sputum production.    Cardiovascular:  Negative for chest pain, palpitations and orthopnea.   Gastrointestinal:  Negative for heartburn, nausea and vomiting.    Genitourinary:  Negative for dysuria and urgency.   Musculoskeletal:  Negative for myalgias and neck pain.   Psychiatric/Behavioral:  Negative for depression.         Physical Exam  Temp:  [36.6 °C (97.9 °F)-37.9 °C (100.2 °F)] 37.4 °C (99.4 °F)  Pulse:  [] 95  Resp:  [15-31] 16  BP: (116-144)/(70-95) 116/72  SpO2:  [92 %-100 %] 95 %    Physical Exam  Constitutional:       Appearance: He is ill-appearing.   Cardiovascular:      Rate and Rhythm: Regular rhythm. Tachycardia present.      Pulses: Normal pulses.      Heart sounds: Normal heart sounds.   Pulmonary:      Effort: Pulmonary effort is normal.      Breath sounds: Normal breath sounds.   Abdominal:      General: Abdomen is flat. Bowel sounds are normal.      Palpations: Abdomen is soft.   Musculoskeletal:         General: Normal range of motion.   Skin:     General: Skin is warm.      Capillary Refill: Capillary refill takes less than 2 seconds.   Neurological:      General: No focal deficit present.      Mental Status: He is alert and oriented to person, place, and time.   Psychiatric:         Mood and Affect: Mood normal.         Fluids    Intake/Output Summary (Last 24 hours) at 5/19/2025 1548  Last data filed at 5/18/2025 2300  Gross per 24 hour   Intake 240 ml   Output --   Net 240 ml       Laboratory  Recent Labs     05/17/25 0252 05/18/25  0000 05/19/25  0248   WBC 8.5 9.1  9.1 8.4   RBC 4.01* 3.99*  3.99* 4.14*   HEMOGLOBIN 11.8* 12.0*  12.0* 12.5*   HEMATOCRIT 36.3* 35.0*  35.0* 36.8*   MCV 90.5 87.7  87.7 88.9   MCH 29.4 30.1  30.1 30.2   MCHC 32.5 34.3  34.3 34.0   RDW 41.2 41.3  41.3 42.0   PLATELETCT 361 381  381 377   MPV 9.1 9.3  9.3 8.7*     Recent Labs     05/17/25 0252 05/18/25  0000 05/19/25  0248   SODIUM 136 137 138   POTASSIUM 3.5* 3.5* 3.8   CHLORIDE 99 101 104   CO2 26 25 21   GLUCOSE 100* 110* 101*   BUN 14 14 13   CREATININE 0.83 0.72 0.64   CALCIUM 8.4* 8.2* 8.2*                   Imaging  CT-CHEST TUBE PLACEMENT  (EMPYEMA AND PNEUMO)   Final Result      Successful CT GUIDED right  CHEST TUBE PLACEMENT.      CT-CHEST (THORAX) W/O   Final Result      1.  Loculated right pleural effusion has increased from the prior CT. Adjacent airspace opacification may represent atelectasis or pneumonia.   2.  Small left pleural effusion with adjacent compressive atelectasis.   3.  Mild mediastinal adenopathy is likely reactive.         DX-CHEST-PORTABLE (1 VIEW)   Final Result      1.  Persistent right pleural effusion and associated atelectasis/consolidation.   2.  No pneumothorax status post right-sided thoracentesis.      US-THORACENTESIS PUNCTURE RIGHT   Final Result      1. Ultrasound guided right sided diagnostic and therapeutic thoracentesis.      2. 500 mL of fluid withdrawn.      DX-CHEST-PORTABLE (1 VIEW)   Final Result         1. Moderate right pleural effusion and atelectasis unchanged.                  EC-ECHOCARDIOGRAM COMPLETE W/O CONT   Final Result      DX-CHEST-PORTABLE (1 VIEW)   Final Result      1.  Hypoinflation.   2.  Moderate right pleural effusion and underlying atelectasis or consolidation. As previously mentioned, possible underlying pneumonia.   3.  Minimal left pleural effusion and subsegmental atelectatic change.      DX-CHEST-LIMITED (1 VIEW)   Final Result      Airspace opacity in the right lower lobe, concerning for pneumonia.      CT-CTA COMPLETE THORACOABDOMINAL AORTA   Final Result      1.  No evidence for aortic aneurysm or dissection.   2.  Loculated right pleural effusion with associated compressive atelectasis and consolidation.   3.  Masslike area of consolidation in the left posterior medial costophrenic angle. This may be due to infection or neoplasm. Clinical correlation and follow-up is needed.   4.  Hepatomegaly with fatty infiltration of the liver.                             Assessment/Plan  Problem Representation:    Pleural effusion on right- (present on admission)  Assessment & Plan  Small  right-sided pleural effusion associated with atelectasis unknown the cause, patient denied any benefit symptom, smoking history.  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  Initial thoracocentesis was unsuccessful but subsequently was able to get 500 cc with low glucose level  Fluid removed 5/16/2025 (Glucose 12, TP 3.9, LDH 1803_ == infected and exudative  Continue with antibiotic Unasyn  Chest x-ray the morning  Follow-up with pleural fluid study  Chest tube placement by IR   If patient is improving clinically to remove the chest tube once drainage is less than 100 mL/day for 2 consecutive day  If inadequate drainage may need intrapleural tPA plus Dnase or additional chest tube, and consultation with pulmonary  Patient who failed treatment even with intrapleural tPA/DNase, additional chest tube should be evaluated by thoracic surgery for possible VATS   Will continue follow-up

## 2025-05-19 NOTE — PROGRESS NOTES
Hospital Medicine Daily Progress Note    Date of Service  5/19/2025    Chief Complaint    Hospital Course  Marlo Gusman is a 60 y.o. male who presented 5/14/2025 with shortness of breath and right sided flank pain x 5 days.  He has a hx of HTN, DLD.  He states he feels his flank pain was from a recent upper respiratory infection he was battling for past 2 weeks with ongoing fever, chills, and coughing.      A CT chest showed a loculated right pleural effusion.  Initially he required pressor support. He was in atrial fib with RVR and hypoxic. An initial thoracentesis was unsuccessfulHe had a thoracentesis with 500cc removed with improvement of his breathing.    Interval Problem Update  Patient had chest tube placed today  Reports feeling markedly better-breathing easier  AF VSS  2 L nasal cannula 95% O2 sats    I have discussed this patient's plan of care and discharge plan at IDT rounds today with Case Management, Nursing, Nursing leadership, and other members of the IDT team.    Consultants/Specialty  Pulmonology, interventional radiology, critical care      Code Status  Full Code    Disposition  The patient is not medically cleared for discharge to home or a post-acute facility.  Anticipate discharge to: home with close outpatient follow-up    I have placed the appropriate orders for post-discharge needs.    Review of Systems  Review of Systems   Constitutional: Negative.  Negative for chills and fever.   Cardiovascular:  Positive for chest pain.   Gastrointestinal:  Negative for nausea and vomiting.   Neurological:  Negative for dizziness.        Physical Exam  Temp:  [36.6 °C (97.9 °F)-37.9 °C (100.2 °F)] 37.4 °C (99.4 °F)  Pulse:  [] 95  Resp:  [15-31] 16  BP: (116-144)/(70-95) 116/72  SpO2:  [92 %-100 %] 95 %    Physical Exam  Vitals and nursing note reviewed.   Constitutional:       Appearance: Normal appearance.   HENT:      Head: Normocephalic and atraumatic.   Eyes:      Extraocular Movements:  Extraocular movements intact.   Cardiovascular:      Rate and Rhythm: Normal rate.      Heart sounds: Normal heart sounds. No murmur heard.  Pulmonary:      Effort: Pulmonary effort is normal. No respiratory distress.      Breath sounds: Normal breath sounds. No wheezing.   Abdominal:      General: Abdomen is flat. Bowel sounds are normal. There is no distension.   Musculoskeletal:         General: Normal range of motion.   Skin:     General: Skin is warm and dry.   Neurological:      General: No focal deficit present.      Mental Status: He is alert. Mental status is at baseline.   Psychiatric:         Mood and Affect: Mood normal.         Thought Content: Thought content normal.         Fluids    Intake/Output Summary (Last 24 hours) at 5/19/2025 1618  Last data filed at 5/18/2025 2300  Gross per 24 hour   Intake 240 ml   Output --   Net 240 ml        Laboratory  Recent Labs     05/17/25  0252 05/18/25  0000 05/19/25  0248   WBC 8.5 9.1  9.1 8.4   RBC 4.01* 3.99*  3.99* 4.14*   HEMOGLOBIN 11.8* 12.0*  12.0* 12.5*   HEMATOCRIT 36.3* 35.0*  35.0* 36.8*   MCV 90.5 87.7  87.7 88.9   MCH 29.4 30.1  30.1 30.2   MCHC 32.5 34.3  34.3 34.0   RDW 41.2 41.3  41.3 42.0   PLATELETCT 361 381  381 377   MPV 9.1 9.3  9.3 8.7*     Recent Labs     05/17/25  0252 05/18/25  0000 05/19/25  0248   SODIUM 136 137 138   POTASSIUM 3.5* 3.5* 3.8   CHLORIDE 99 101 104   CO2 26 25 21   GLUCOSE 100* 110* 101*   BUN 14 14 13   CREATININE 0.83 0.72 0.64   CALCIUM 8.4* 8.2* 8.2*                   Imaging  CT guided chest tube 5/19/2025    Assessment/Plan  * Atrial fibrillation with RVR (HCC)- (present on admission)  Assessment & Plan  Resolve of RVR  Monitor on telemetry  Metoprolol 12.5mg BID  Vitals:    05/18/25 1320   BP:    Pulse:    Resp: (!) 22   Temp:    SpO2:      Inc BB    Hypertension  Assessment & Plan  Metoprolol.  Hold outpatientHCTZrestart losartan and amlodipine as BP dictate/allows  Vitals:    05/17/25 1232   BP: 126/85    Pulse: (!) 108   Resp: 20   Temp: 36.8 °C (98.2 °F)   SpO2: 92%     Stable.    Acute hypoxic respiratory failure (HCC)- (present on admission)  Assessment & Plan  Concern of recent URI and probable post obstructive pneumonia.  Thoracentesis with 500cc removed  Follow up on pleural effusion evaluation  Titrate oxygen  Mobilize  Antitussives as need    Mass of lower lobe of left lung- (present on admission)  Assessment & Plan  Mass vs consolidation  Follow up on pleural effusion cytology  May need future follow up CT and or biopsy.  Treating as infection at this time.    Pulmonology following    High anion gap metabolic acidosis- (present on admission)  Assessment & Plan  resolve    QUE (acute kidney injury) (HCC)- (present on admission)  Assessment & Plan  Suspected prerenal and hypovolemia  Improved with IV fluids and resolve of Afib rvr  Monitor on tele  Recent Labs     05/16/25  0624 05/17/25  0252 05/18/25  0000   SODIUM 132* 136 137   POTASSIUM 4.5 3.5* 3.5*   CHLORIDE 101 99 101   CO2 21 26 25   GLUCOSE 90 100* 110*   BUN 15 14 14   CREATININE 0.82 0.83 0.72         Pleural effusion on right- (present on admission)  Assessment & Plan  Had a thoracentesis.  F/U post thoracentesis xray still with opacification of right lower lung    Follow cultures  Pulmnology following  CT showed worsening  IR guided piggy back catheter planned    Sepsis (HCC)- (present on admission)  Assessment & Plan  S/p pressors and has had IV fluid resuscitation  Monitor I/O's, labs, and vitals.    Follow pleural fluid cultures         VTE prophylaxis:    enoxaparin ppx      I have performed a physical exam and reviewed and updated ROS and Plan today (5/19/2025). In review of yesterday's note (5/18/2025), there are no changes except as documented above.

## 2025-05-19 NOTE — OR SURGEON
Immediate Post- Operative Note        Findings: Right pleural effusion      Procedure(s): Right chest tube placement      Estimated Blood Loss: Less than 5 ml        Complications: None            5/19/2025     10:40 AM     Nigel Gallardo M.D.

## 2025-05-19 NOTE — PROGRESS NOTES
0715: Assumed care of patient from NOC RN. Patient assessment obtained. Patient is A&Ox4, on 3lpm via NC, with 0/10 pain at this time. Patient educated on call light. Bed is locked & low. Bedside table and belongins near patient. No further needs at this time.

## 2025-05-20 ENCOUNTER — APPOINTMENT (OUTPATIENT)
Dept: RADIOLOGY | Facility: MEDICAL CENTER | Age: 60
DRG: 871 | End: 2025-05-20
Payer: COMMERCIAL

## 2025-05-20 LAB
ALBUMIN SERPL BCP-MCNC: 2.6 G/DL (ref 3.2–4.9)
ALBUMIN/GLOB SERPL: 0.8 G/DL
ALP SERPL-CCNC: 178 U/L (ref 30–99)
ALT SERPL-CCNC: 26 U/L (ref 2–50)
ANION GAP SERPL CALC-SCNC: 11 MMOL/L (ref 7–16)
AST SERPL-CCNC: 24 U/L (ref 12–45)
BASOPHILS # BLD AUTO: 0.4 % (ref 0–1.8)
BASOPHILS # BLD: 0.03 K/UL (ref 0–0.12)
BILIRUB SERPL-MCNC: 0.3 MG/DL (ref 0.1–1.5)
BUN SERPL-MCNC: 14 MG/DL (ref 8–22)
CALCIUM ALBUM COR SERPL-MCNC: 9.1 MG/DL (ref 8.5–10.5)
CALCIUM SERPL-MCNC: 8 MG/DL (ref 8.5–10.5)
CHLORIDE SERPL-SCNC: 102 MMOL/L (ref 96–112)
CO2 SERPL-SCNC: 23 MMOL/L (ref 20–33)
CREAT SERPL-MCNC: 0.71 MG/DL (ref 0.5–1.4)
EOSINOPHIL # BLD AUTO: 0.21 K/UL (ref 0–0.51)
EOSINOPHIL NFR BLD: 3.1 % (ref 0–6.9)
ERYTHROCYTE [DISTWIDTH] IN BLOOD BY AUTOMATED COUNT: 42.5 FL (ref 35.9–50)
GFR SERPLBLD CREATININE-BSD FMLA CKD-EPI: 104 ML/MIN/1.73 M 2
GLOBULIN SER CALC-MCNC: 3.2 G/DL (ref 1.9–3.5)
GLUCOSE SERPL-MCNC: 98 MG/DL (ref 65–99)
HCT VFR BLD AUTO: 33.3 % (ref 42–52)
HGB BLD-MCNC: 11 G/DL (ref 14–18)
IMM GRANULOCYTES # BLD AUTO: 0.05 K/UL (ref 0–0.11)
IMM GRANULOCYTES NFR BLD AUTO: 0.7 % (ref 0–0.9)
LYMPHOCYTES # BLD AUTO: 1.13 K/UL (ref 1–4.8)
LYMPHOCYTES NFR BLD: 16.6 % (ref 22–41)
MAGNESIUM SERPL-MCNC: 2.1 MG/DL (ref 1.5–2.5)
MCH RBC QN AUTO: 30.1 PG (ref 27–33)
MCHC RBC AUTO-ENTMCNC: 33 G/DL (ref 32.3–36.5)
MCV RBC AUTO: 91 FL (ref 81.4–97.8)
MONOCYTES # BLD AUTO: 0.6 K/UL (ref 0–0.85)
MONOCYTES NFR BLD AUTO: 8.8 % (ref 0–13.4)
NEUTROPHILS # BLD AUTO: 4.8 K/UL (ref 1.82–7.42)
NEUTROPHILS NFR BLD: 70.4 % (ref 44–72)
NRBC # BLD AUTO: 0 K/UL
NRBC BLD-RTO: 0 /100 WBC (ref 0–0.2)
PLATELET # BLD AUTO: 382 K/UL (ref 164–446)
PMV BLD AUTO: 8.7 FL (ref 9–12.9)
POTASSIUM SERPL-SCNC: 3.8 MMOL/L (ref 3.6–5.5)
PROT SERPL-MCNC: 5.8 G/DL (ref 6–8.2)
RBC # BLD AUTO: 3.66 M/UL (ref 4.7–6.1)
SODIUM SERPL-SCNC: 136 MMOL/L (ref 135–145)
WBC # BLD AUTO: 6.8 K/UL (ref 4.8–10.8)

## 2025-05-20 PROCEDURE — A9270 NON-COVERED ITEM OR SERVICE: HCPCS

## 2025-05-20 PROCEDURE — 700102 HCHG RX REV CODE 250 W/ 637 OVERRIDE(OP): Performed by: INTERNAL MEDICINE

## 2025-05-20 PROCEDURE — 80053 COMPREHEN METABOLIC PANEL: CPT

## 2025-05-20 PROCEDURE — 700105 HCHG RX REV CODE 258: Performed by: FAMILY MEDICINE

## 2025-05-20 PROCEDURE — 700111 HCHG RX REV CODE 636 W/ 250 OVERRIDE (IP): Mod: JZ | Performed by: FAMILY MEDICINE

## 2025-05-20 PROCEDURE — 85025 COMPLETE CBC W/AUTO DIFF WBC: CPT

## 2025-05-20 PROCEDURE — A9270 NON-COVERED ITEM OR SERVICE: HCPCS | Performed by: STUDENT IN AN ORGANIZED HEALTH CARE EDUCATION/TRAINING PROGRAM

## 2025-05-20 PROCEDURE — 770006 HCHG ROOM/CARE - MED/SURG/GYN SEMI*

## 2025-05-20 PROCEDURE — 36415 COLL VENOUS BLD VENIPUNCTURE: CPT

## 2025-05-20 PROCEDURE — 99232 SBSQ HOSP IP/OBS MODERATE 35: CPT | Performed by: STUDENT IN AN ORGANIZED HEALTH CARE EDUCATION/TRAINING PROGRAM

## 2025-05-20 PROCEDURE — 71046 X-RAY EXAM CHEST 2 VIEWS: CPT

## 2025-05-20 PROCEDURE — 700102 HCHG RX REV CODE 250 W/ 637 OVERRIDE(OP): Performed by: STUDENT IN AN ORGANIZED HEALTH CARE EDUCATION/TRAINING PROGRAM

## 2025-05-20 PROCEDURE — 99232 SBSQ HOSP IP/OBS MODERATE 35: CPT | Mod: GC | Performed by: STUDENT IN AN ORGANIZED HEALTH CARE EDUCATION/TRAINING PROGRAM

## 2025-05-20 PROCEDURE — 83735 ASSAY OF MAGNESIUM: CPT

## 2025-05-20 PROCEDURE — 700102 HCHG RX REV CODE 250 W/ 637 OVERRIDE(OP)

## 2025-05-20 PROCEDURE — A9270 NON-COVERED ITEM OR SERVICE: HCPCS | Performed by: INTERNAL MEDICINE

## 2025-05-20 RX ORDER — GUAIFENESIN/DEXTROMETHORPHAN 100-10MG/5
5 SYRUP ORAL EVERY 6 HOURS
Status: DISCONTINUED | OUTPATIENT
Start: 2025-05-20 | End: 2025-05-27 | Stop reason: HOSPADM

## 2025-05-20 RX ADMIN — AMPICILLIN AND SULBACTAM 3 G: 1; 2 INJECTION, POWDER, FOR SOLUTION INTRAMUSCULAR; INTRAVENOUS at 04:22

## 2025-05-20 RX ADMIN — GUAIFENESIN SYRUP AND DEXTROMETHORPHAN 5 ML: 100; 10 SYRUP ORAL at 21:33

## 2025-05-20 RX ADMIN — AMPICILLIN AND SULBACTAM 3 G: 1; 2 INJECTION, POWDER, FOR SOLUTION INTRAMUSCULAR; INTRAVENOUS at 16:00

## 2025-05-20 RX ADMIN — METOPROLOL TARTRATE 25 MG: 25 TABLET, FILM COATED ORAL at 17:12

## 2025-05-20 RX ADMIN — SENNOSIDES, DOCUSATE SODIUM 2 TABLET: 50; 8.6 TABLET, FILM COATED ORAL at 17:12

## 2025-05-20 RX ADMIN — METOPROLOL TARTRATE 25 MG: 25 TABLET, FILM COATED ORAL at 04:31

## 2025-05-20 RX ADMIN — OXYCODONE 2.5 MG: 5 TABLET ORAL at 02:14

## 2025-05-20 RX ADMIN — OXYCODONE 5 MG: 5 TABLET ORAL at 21:33

## 2025-05-20 RX ADMIN — AMPICILLIN AND SULBACTAM 3 G: 1; 2 INJECTION, POWDER, FOR SOLUTION INTRAMUSCULAR; INTRAVENOUS at 21:27

## 2025-05-20 RX ADMIN — AMPICILLIN AND SULBACTAM 3 G: 1; 2 INJECTION, POWDER, FOR SOLUTION INTRAMUSCULAR; INTRAVENOUS at 10:13

## 2025-05-20 ASSESSMENT — ENCOUNTER SYMPTOMS
HEMOPTYSIS: 0
VOMITING: 0
SHORTNESS OF BREATH: 0
DEPRESSION: 0
CHILLS: 0
NAUSEA: 0
HEADACHES: 0
COUGH: 0
WEIGHT LOSS: 0
HEARTBURN: 0
ABDOMINAL PAIN: 0
FEVER: 1
CONSTITUTIONAL NEGATIVE: 1
SPUTUM PRODUCTION: 0
FEVER: 0
PALPITATIONS: 0
DIZZINESS: 0

## 2025-05-20 ASSESSMENT — PAIN DESCRIPTION - PAIN TYPE
TYPE: ACUTE PAIN

## 2025-05-20 NOTE — PROGRESS NOTES
"Radiology Progress Note   Author: BENJAMIN Garcia Date & Time created: 5/20/2025  12:36 PM   Date of admission  5/14/2025  Note to reader: this note follows the APSO format rather than the historical SOAP format. Assessment and plan located at the top of the note for ease of use.    Chief Complaint  60 y.o. male admitted 5/14/2025 with   Chief Complaint   Patient presents with    Flank Pain     Right sided flank pain x 5 days, pt denies dysuria but reports pain is darker than normal. +nausea.        HPI  Patient \"is a 60 y.o. male who presented 5/14/2025 with shortness of breath and right sided flank pain x 5 days.  He has a hx of HTN, DLD.  He states he feels his flank pain was from a recent upper respiratory infection he was battling for past 2 weeks with ongoing fever, chills, and coughing. A CT chest showed a loculated right pleural effusion.  Initially he required pressor support. He was in atrial fib with RVR and hypoxic. An initial thoracentesis was unsuccessful. He had a thoracentesis with 500cc removed with improvement of his breathing.\" - MD Chris. IR was consulted for Right chest tube placement r/t loculation of right pleural effusion. IR Dr. Gallardo performed Right chest tube placement on 5/19/25.    Interval History:   05/20/25 - IR Right posterior 10F Chest tube to Pleur-Evac with 590 mL serosanguineous output in the last 24 hours. Chest tube to - 20 cm H2O suction; no leaks appreciated. Labs reviewed; WBC 6.8, H/H 11/33.3, Cr 0.71. Cultures pending. CXR this morning shows, \"Interval placement of small caliber RIGHT basilar chest tube; No pneumothorax; & Persistent moderate RIGHT pleural fluid collection with associated consolidation.\" SpO2 95% on room air. I discussed anticipated plan of care with patient. I discussed patient with hospital nursing staff and I reviewed IDT notes. Anticipate possible lytic treatment by pulm.     Assessment/Plan     Principal Problem:    Atrial fibrillation " with RVR (HCC)  Active Problems:    Sepsis (HCC)    Pleural effusion on right    QUE (acute kidney injury) (HCC)    High anion gap metabolic acidosis    Mass of lower lobe of left lung    Acute hypoxic respiratory failure (HCC)    Hypertension    Shock (HCC)    Plan IR  - Continue Right posterior chest tube to suction -20 cm H2O; defer to pulm during/after lytic treatments if/when applicable.  - Thoracentesis fluid cultures (5/16) pending - NGTD.  - Pleural fluid cultures (5/19) pending - NGTD.  - Serial CXR  - Hospitalist and Pulmonary following.  - lytic therapy pending pulm decision making.  - Recommend fu CT in 5-7 days or when output decreases to approx 100 mL out/24 hours  - Continue to monitor output, labs, and VS.  -  -Thank you for allowing Interventional Radiology team to participate in the patients care, if any additional care or requests are needed in the future please do not hesitate to call or place IR order.        Review of Systems  Physical Exam   Review of Systems   Constitutional:  Negative for chills, fever and malaise/fatigue.   Respiratory:  Negative for cough and shortness of breath.    Cardiovascular:  Positive for chest pain (minor discomfort at chest tube insertion site).   Gastrointestinal:  Negative for abdominal pain, nausea and vomiting.      Vitals:    05/20/25 0748   BP: 105/71   Pulse: 85   Resp: 16   Temp: 36.7 °C (98 °F)   SpO2: 95%     Physical Exam  Vitals and nursing note reviewed.   Constitutional:       General: He is not in acute distress.  Cardiovascular:      Rate and Rhythm: Normal rate.   Pulmonary:      Effort: Pulmonary effort is normal. No respiratory distress.      Comments: IR Right Posterior 10F Chest tube in place.  Abdominal:      Palpations: Abdomen is soft.      Tenderness: There is no abdominal tenderness.   Skin:     General: Skin is warm and dry.   Neurological:      General: No focal deficit present.      Mental Status: He is alert and oriented to person,  place, and time.   Psychiatric:         Mood and Affect: Mood normal.         Behavior: Behavior normal.          Labs    Recent Labs     05/18/25  0000 05/19/25  0248 05/20/25  0240   WBC 9.1  9.1 8.4 6.8   RBC 3.99*  3.99* 4.14* 3.66*   HEMOGLOBIN 12.0*  12.0* 12.5* 11.0*   HEMATOCRIT 35.0*  35.0* 36.8* 33.3*   MCV 87.7  87.7 88.9 91.0   MCH 30.1  30.1 30.2 30.1   MCHC 34.3  34.3 34.0 33.0   RDW 41.3  41.3 42.0 42.5   PLATELETCT 381  381 377 382   MPV 9.3  9.3 8.7* 8.7*     Recent Labs     05/18/25  0000 05/19/25  0248 05/20/25  0240   SODIUM 137 138 136   POTASSIUM 3.5* 3.8 3.8   CHLORIDE 101 104 102   CO2 25 21 23   GLUCOSE 110* 101* 98   BUN 14 13 14   CREATININE 0.72 0.64 0.71   CALCIUM 8.2* 8.2* 8.0*     Recent Labs     05/18/25  0000 05/19/25  0248 05/20/25  0240   ALBUMIN 2.7* 2.7* 2.6*   TBILIRUBIN 0.7 0.5 0.3   ALKPHOSPHAT 202* 157* 178*   TOTPROTEIN 6.0 6.1 5.8*   ALTSGPT 28 25 26   ASTSGOT 30 20 24   CREATININE 0.72 0.64 0.71     DX-CHEST-2 VIEWS   Final Result      1.  Interval placement of small caliber RIGHT basilar chest tube.   2.  No pneumothorax.   3.  Persistent moderate RIGHT pleural fluid collection with associated consolidation.      CT-CHEST TUBE PLACEMENT (EMPYEMA AND PNEUMO)   Final Result      Successful CT GUIDED right  CHEST TUBE PLACEMENT.      CT-CHEST (THORAX) W/O   Final Result      1.  Loculated right pleural effusion has increased from the prior CT. Adjacent airspace opacification may represent atelectasis or pneumonia.   2.  Small left pleural effusion with adjacent compressive atelectasis.   3.  Mild mediastinal adenopathy is likely reactive.         DX-CHEST-PORTABLE (1 VIEW)   Final Result      1.  Persistent right pleural effusion and associated atelectasis/consolidation.   2.  No pneumothorax status post right-sided thoracentesis.      US-THORACENTESIS PUNCTURE RIGHT   Final Result      1. Ultrasound guided right sided diagnostic and therapeutic thoracentesis.     "  2. 500 mL of fluid withdrawn.      DX-CHEST-PORTABLE (1 VIEW)   Final Result         1. Moderate right pleural effusion and atelectasis unchanged.                  EC-ECHOCARDIOGRAM COMPLETE W/O CONT   Final Result      DX-CHEST-PORTABLE (1 VIEW)   Final Result      1.  Hypoinflation.   2.  Moderate right pleural effusion and underlying atelectasis or consolidation. As previously mentioned, possible underlying pneumonia.   3.  Minimal left pleural effusion and subsegmental atelectatic change.      DX-CHEST-LIMITED (1 VIEW)   Final Result      Airspace opacity in the right lower lobe, concerning for pneumonia.      CT-CTA COMPLETE THORACOABDOMINAL AORTA   Final Result      1.  No evidence for aortic aneurysm or dissection.   2.  Loculated right pleural effusion with associated compressive atelectasis and consolidation.   3.  Masslike area of consolidation in the left posterior medial costophrenic angle. This may be due to infection or neoplasm. Clinical correlation and follow-up is needed.   4.  Hepatomegaly with fatty infiltration of the liver.                          INR   Date Value Ref Range Status   05/14/2025 1.15 (H) 0.87 - 1.13 Final     Comment:     INR - Non-therapeutic Reference Range: 0.87-1.13  INR - Therapeutic Reference Range: 2.0-4.0       No results found for: \"POCINR\"     Intake/Output Summary (Last 24 hours) at 5/20/2025 0909  Last data filed at 5/20/2025 0645  Gross per 24 hour   Intake --   Output 590 ml   Net -590 ml      I have personally reviewed the above labs and imaging      I have performed a physical exam and reviewed and updated ROS and Plan today (5/20/2025).     49 minutes in directly providing and coordinating care and extensive data review.  No time overlap and excludes procedures.   "

## 2025-05-20 NOTE — PROGRESS NOTES
Rec'd report from day shift RN. Assumed pt care. Assessment completed. AA&OX4. Denies pain at this time. Chest tube to right upper/backside is patent, scant blood on gauze dressing at insertion site. Pt reports a rash to backside that has been present for about 2 days. Slight rash to lower and upper back.  in use. Pt ambulates to the bathroom with SBA, maintain steady gait. Bed in lowest position, bed locked, treaded socks in place, RN and CNA numbers provided, call light within reach.

## 2025-05-20 NOTE — CARE PLAN
The patient is Watcher - Medium risk of patient condition declining or worsening    Shift Goals  Clinical Goals: Will wean O2 by 0.5 during the night.  Patient Goals: rest  Family Goals: PERLITA    Progress made toward(s) clinical / shift goals:  O2 weaned to 1.5L NC for the NOC, satting at 93%.     Patient is not progressing towards the following goals:

## 2025-05-20 NOTE — CARE PLAN
The patient is Stable - Low risk of patient condition declining or worsening    Shift Goals  Clinical Goals: Ambulate  Patient Goals: Ambulate  Family Goals: PERLITA    Progress made toward(s) clinical / shift goals:     Problem: Pain - Standard  Goal: Alleviation of pain or a reduction in pain to the patient’s comfort goal  Outcome: Progressing  Note: Pt states mild discomfort from chest tube insertion site but not needing any medications. Pain is tolerable during ADLs and ambulation.     Problem: Respiratory  Goal: Patient will achieve/maintain optimum respiratory ventilation and gas exchange  Outcome: Progressing

## 2025-05-20 NOTE — PROGRESS NOTES
Assumed care of patient after receiving report from PAL Shell. Pt is sitting edge of bed. Bed low/locked. Belongings within reach. Discussed plan to ambulate with chest tube today. No further needs at this time.

## 2025-05-20 NOTE — PROGRESS NOTES
[facility] Pulmonary Medicine Daily Progress Note    Date of Service  5/20/2025    Attending: Abby Walden MD  Senior Resident: David Mendoza MD  Contact Number: [phone]    Reason for consultation:  Loculated right-sided pleural effusion    Hospital Course  This is 60 years old male with past medical history of hypertension, hyperlipidemia who presented to ED with severe right-sided chest pain, fever, chills and diaphoresis.  Patient denied any smoking history, sick contact, recent travel, history of similar condition.    In the ED, patient was found to have A-fib with RVR in the 140s and mildly hypoxic.  He is CTA chest revealed loculated right pleural effusion with adjacent atelectasis and patient was admitted to ICU.    Pulmonary was consulted for declined pleural effusion and initial thoracocentesis was not successful but subsequent was able to get 500 cc on 5/16/2025 with a glucose of 12, total protein 3.9, LDH 1803.    Patient had chest tube inserted on 5/19/2025 and drained 590 so far.      Interval Problem Update  Patient reported feeling better today, denied any shortness of breath or chest pain  Patient chest tube drainaged 590 mL since yesterday  On 2 L nasal cannula saturating at 95%  Patient vital stable, blood work within normal level, culture still negative  Chest x-ray showed a little improvement will continue chest tube suction and follow up with daily chest x-ray      Code Status  Full Code    Review of Systems  Review of Systems   Constitutional:  Positive for fever and malaise/fatigue. Negative for chills and weight loss.   Respiratory:  Negative for cough, hemoptysis and sputum production.    Cardiovascular:  Positive for chest pain. Negative for palpitations and leg swelling.   Gastrointestinal:  Negative for abdominal pain, heartburn and vomiting.   Genitourinary:  Negative for dysuria, frequency and urgency.   Neurological:  Negative for dizziness and headaches.    Psychiatric/Behavioral:  Negative for depression.         Physical Exam  Temp:  [36.6 °C (97.8 °F)-37.7 °C (99.9 °F)] 36.7 °C (98 °F)  Pulse:  [] 85  Resp:  [15-31] 16  BP: (104-144)/(64-95) 105/71  SpO2:  [92 %-100 %] 95 %    Physical Exam  Constitutional:       Appearance: Normal appearance.   HENT:      Head: Normocephalic and atraumatic.   Cardiovascular:      Rate and Rhythm: Normal rate and regular rhythm.      Pulses: Normal pulses.      Heart sounds: Normal heart sounds.   Pulmonary:      Effort: Pulmonary effort is normal.      Breath sounds: Normal breath sounds.   Abdominal:      General: Abdomen is flat. Bowel sounds are normal.      Palpations: Abdomen is soft.   Musculoskeletal:         General: Normal range of motion.   Skin:     General: Skin is warm.      Capillary Refill: Capillary refill takes less than 2 seconds.   Neurological:      General: No focal deficit present.      Mental Status: He is alert and oriented to person, place, and time.         Fluids    Intake/Output Summary (Last 24 hours) at 5/20/2025 0939  Last data filed at 5/20/2025 0645  Gross per 24 hour   Intake --   Output 590 ml   Net -590 ml       Laboratory  Recent Labs     05/18/25  0000 05/19/25  0248 05/20/25  0240   WBC 9.1  9.1 8.4 6.8   RBC 3.99*  3.99* 4.14* 3.66*   HEMOGLOBIN 12.0*  12.0* 12.5* 11.0*   HEMATOCRIT 35.0*  35.0* 36.8* 33.3*   MCV 87.7  87.7 88.9 91.0   MCH 30.1  30.1 30.2 30.1   MCHC 34.3  34.3 34.0 33.0   RDW 41.3  41.3 42.0 42.5   PLATELETCT 381  381 377 382   MPV 9.3  9.3 8.7* 8.7*     Recent Labs     05/18/25  0000 05/19/25  0248 05/20/25  0240   SODIUM 137 138 136   POTASSIUM 3.5* 3.8 3.8   CHLORIDE 101 104 102   CO2 25 21 23   GLUCOSE 110* 101* 98   BUN 14 13 14   CREATININE 0.72 0.64 0.71   CALCIUM 8.2* 8.2* 8.0*                   Imaging  CT-CHEST TUBE PLACEMENT (EMPYEMA AND PNEUMO)   Final Result      Successful CT GUIDED right  CHEST TUBE PLACEMENT.      CT-CHEST (THORAX) W/O    Final Result      1.  Loculated right pleural effusion has increased from the prior CT. Adjacent airspace opacification may represent atelectasis or pneumonia.   2.  Small left pleural effusion with adjacent compressive atelectasis.   3.  Mild mediastinal adenopathy is likely reactive.         DX-CHEST-PORTABLE (1 VIEW)   Final Result      1.  Persistent right pleural effusion and associated atelectasis/consolidation.   2.  No pneumothorax status post right-sided thoracentesis.      US-THORACENTESIS PUNCTURE RIGHT   Final Result      1. Ultrasound guided right sided diagnostic and therapeutic thoracentesis.      2. 500 mL of fluid withdrawn.      DX-CHEST-PORTABLE (1 VIEW)   Final Result         1. Moderate right pleural effusion and atelectasis unchanged.                  EC-ECHOCARDIOGRAM COMPLETE W/O CONT   Final Result      DX-CHEST-PORTABLE (1 VIEW)   Final Result      1.  Hypoinflation.   2.  Moderate right pleural effusion and underlying atelectasis or consolidation. As previously mentioned, possible underlying pneumonia.   3.  Minimal left pleural effusion and subsegmental atelectatic change.      DX-CHEST-LIMITED (1 VIEW)   Final Result      Airspace opacity in the right lower lobe, concerning for pneumonia.      CT-CTA COMPLETE THORACOABDOMINAL AORTA   Final Result      1.  No evidence for aortic aneurysm or dissection.   2.  Loculated right pleural effusion with associated compressive atelectasis and consolidation.   3.  Masslike area of consolidation in the left posterior medial costophrenic angle. This may be due to infection or neoplasm. Clinical correlation and follow-up is needed.   4.  Hepatomegaly with fatty infiltration of the liver.                        DX-CHEST-2 VIEWS    (Results Pending)        Assessment/Plan  Problem Representation:    Pleural effusion on right- (present on admission)  Assessment & Plan  Small right-sided pleural effusion associated with atelectasis unknown the cause,  patient denied any benefit symptom, smoking history.  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  Initial thoracocentesis was unsuccessful but subsequently was able to get 500 cc with low glucose level  Fluid removed 5/16/2025 (Glucose 12, TP 3.9, LDH 1803_ == infected and exudative  Chest tube draining 590 mL (  Continue with antibiotic Unasyn  Chest x-ray the morning  Follow-up with cultures  Monitor chest tube output-patient may need intrapleural fibrinolytic based on his chest tube output and chest x-rays  Will continue follow-up

## 2025-05-21 ENCOUNTER — APPOINTMENT (OUTPATIENT)
Dept: RADIOLOGY | Facility: MEDICAL CENTER | Age: 60
DRG: 871 | End: 2025-05-21
Payer: COMMERCIAL

## 2025-05-21 LAB
ALBUMIN SERPL BCP-MCNC: 2.6 G/DL (ref 3.2–4.9)
ALBUMIN/GLOB SERPL: 0.8 G/DL
ALP SERPL-CCNC: 180 U/L (ref 30–99)
ALT SERPL-CCNC: 49 U/L (ref 2–50)
ANION GAP SERPL CALC-SCNC: 11 MMOL/L (ref 7–16)
AST SERPL-CCNC: 47 U/L (ref 12–45)
BASOPHILS # BLD AUTO: 0.5 % (ref 0–1.8)
BASOPHILS # BLD: 0.03 K/UL (ref 0–0.12)
BILIRUB SERPL-MCNC: 0.3 MG/DL (ref 0.1–1.5)
BUN SERPL-MCNC: 12 MG/DL (ref 8–22)
CALCIUM ALBUM COR SERPL-MCNC: 9.2 MG/DL (ref 8.5–10.5)
CALCIUM SERPL-MCNC: 8.1 MG/DL (ref 8.5–10.5)
CHLORIDE SERPL-SCNC: 104 MMOL/L (ref 96–112)
CO2 SERPL-SCNC: 22 MMOL/L (ref 20–33)
CREAT SERPL-MCNC: 0.77 MG/DL (ref 0.5–1.4)
EOSINOPHIL # BLD AUTO: 0.19 K/UL (ref 0–0.51)
EOSINOPHIL NFR BLD: 2.9 % (ref 0–6.9)
ERYTHROCYTE [DISTWIDTH] IN BLOOD BY AUTOMATED COUNT: 42 FL (ref 35.9–50)
FUNGUS SPEC CULT: NORMAL
FUNGUS SPEC FUNGUS STN: NORMAL
GFR SERPLBLD CREATININE-BSD FMLA CKD-EPI: 102 ML/MIN/1.73 M 2
GLOBULIN SER CALC-MCNC: 3.3 G/DL (ref 1.9–3.5)
GLUCOSE SERPL-MCNC: 111 MG/DL (ref 65–99)
HCT VFR BLD AUTO: 34.4 % (ref 42–52)
HGB BLD-MCNC: 11.4 G/DL (ref 14–18)
IMM GRANULOCYTES # BLD AUTO: 0.05 K/UL (ref 0–0.11)
IMM GRANULOCYTES NFR BLD AUTO: 0.8 % (ref 0–0.9)
LYMPHOCYTES # BLD AUTO: 1.03 K/UL (ref 1–4.8)
LYMPHOCYTES NFR BLD: 15.9 % (ref 22–41)
MAGNESIUM SERPL-MCNC: 2.1 MG/DL (ref 1.5–2.5)
MCH RBC QN AUTO: 29.8 PG (ref 27–33)
MCHC RBC AUTO-ENTMCNC: 33.1 G/DL (ref 32.3–36.5)
MCV RBC AUTO: 89.8 FL (ref 81.4–97.8)
MONOCYTES # BLD AUTO: 0.56 K/UL (ref 0–0.85)
MONOCYTES NFR BLD AUTO: 8.7 % (ref 0–13.4)
NEUTROPHILS # BLD AUTO: 4.61 K/UL (ref 1.82–7.42)
NEUTROPHILS NFR BLD: 71.2 % (ref 44–72)
NRBC # BLD AUTO: 0 K/UL
NRBC BLD-RTO: 0 /100 WBC (ref 0–0.2)
PLATELET # BLD AUTO: 397 K/UL (ref 164–446)
PMV BLD AUTO: 8.6 FL (ref 9–12.9)
POTASSIUM SERPL-SCNC: 4 MMOL/L (ref 3.6–5.5)
PROT SERPL-MCNC: 5.9 G/DL (ref 6–8.2)
RBC # BLD AUTO: 3.83 M/UL (ref 4.7–6.1)
SIGNIFICANT IND 70042: NORMAL
SITE SITE: NORMAL
SODIUM SERPL-SCNC: 137 MMOL/L (ref 135–145)
SOURCE SOURCE: NORMAL
WBC # BLD AUTO: 6.5 K/UL (ref 4.8–10.8)

## 2025-05-21 PROCEDURE — A9270 NON-COVERED ITEM OR SERVICE: HCPCS | Performed by: STUDENT IN AN ORGANIZED HEALTH CARE EDUCATION/TRAINING PROGRAM

## 2025-05-21 PROCEDURE — 85025 COMPLETE CBC W/AUTO DIFF WBC: CPT

## 2025-05-21 PROCEDURE — A9270 NON-COVERED ITEM OR SERVICE: HCPCS | Performed by: INTERNAL MEDICINE

## 2025-05-21 PROCEDURE — A9270 NON-COVERED ITEM OR SERVICE: HCPCS

## 2025-05-21 PROCEDURE — 700111 HCHG RX REV CODE 636 W/ 250 OVERRIDE (IP): Mod: JZ | Performed by: FAMILY MEDICINE

## 2025-05-21 PROCEDURE — 700101 HCHG RX REV CODE 250: Mod: JZ | Performed by: STUDENT IN AN ORGANIZED HEALTH CARE EDUCATION/TRAINING PROGRAM

## 2025-05-21 PROCEDURE — 700105 HCHG RX REV CODE 258: Performed by: STUDENT IN AN ORGANIZED HEALTH CARE EDUCATION/TRAINING PROGRAM

## 2025-05-21 PROCEDURE — 700102 HCHG RX REV CODE 250 W/ 637 OVERRIDE(OP): Performed by: STUDENT IN AN ORGANIZED HEALTH CARE EDUCATION/TRAINING PROGRAM

## 2025-05-21 PROCEDURE — 700102 HCHG RX REV CODE 250 W/ 637 OVERRIDE(OP)

## 2025-05-21 PROCEDURE — 3E0L317 INTRODUCTION OF OTHER THROMBOLYTIC INTO PLEURAL CAVITY, PERCUTANEOUS APPROACH: ICD-10-PCS | Performed by: STUDENT IN AN ORGANIZED HEALTH CARE EDUCATION/TRAINING PROGRAM

## 2025-05-21 PROCEDURE — 700102 HCHG RX REV CODE 250 W/ 637 OVERRIDE(OP): Performed by: INTERNAL MEDICINE

## 2025-05-21 PROCEDURE — 32561 LYSE CHEST FIBRIN INIT DAY: CPT | Performed by: STUDENT IN AN ORGANIZED HEALTH CARE EDUCATION/TRAINING PROGRAM

## 2025-05-21 PROCEDURE — 770006 HCHG ROOM/CARE - MED/SURG/GYN SEMI*

## 2025-05-21 PROCEDURE — 700105 HCHG RX REV CODE 258: Performed by: FAMILY MEDICINE

## 2025-05-21 PROCEDURE — 71046 X-RAY EXAM CHEST 2 VIEWS: CPT

## 2025-05-21 PROCEDURE — 36415 COLL VENOUS BLD VENIPUNCTURE: CPT

## 2025-05-21 PROCEDURE — 700111 HCHG RX REV CODE 636 W/ 250 OVERRIDE (IP): Mod: JZ | Performed by: STUDENT IN AN ORGANIZED HEALTH CARE EDUCATION/TRAINING PROGRAM

## 2025-05-21 PROCEDURE — 99232 SBSQ HOSP IP/OBS MODERATE 35: CPT | Mod: 25,GC | Performed by: STUDENT IN AN ORGANIZED HEALTH CARE EDUCATION/TRAINING PROGRAM

## 2025-05-21 PROCEDURE — 83735 ASSAY OF MAGNESIUM: CPT

## 2025-05-21 PROCEDURE — 99232 SBSQ HOSP IP/OBS MODERATE 35: CPT | Performed by: STUDENT IN AN ORGANIZED HEALTH CARE EDUCATION/TRAINING PROGRAM

## 2025-05-21 PROCEDURE — 80053 COMPREHEN METABOLIC PANEL: CPT

## 2025-05-21 RX ADMIN — AMPICILLIN AND SULBACTAM 3 G: 1; 2 INJECTION, POWDER, FOR SOLUTION INTRAMUSCULAR; INTRAVENOUS at 04:06

## 2025-05-21 RX ADMIN — OXYCODONE 5 MG: 5 TABLET ORAL at 18:29

## 2025-05-21 RX ADMIN — GUAIFENESIN SYRUP AND DEXTROMETHORPHAN 5 ML: 100; 10 SYRUP ORAL at 06:00

## 2025-05-21 RX ADMIN — AMPICILLIN AND SULBACTAM 3 G: 1; 2 INJECTION, POWDER, FOR SOLUTION INTRAMUSCULAR; INTRAVENOUS at 23:17

## 2025-05-21 RX ADMIN — OXYCODONE 2.5 MG: 5 TABLET ORAL at 14:49

## 2025-05-21 RX ADMIN — AMPICILLIN AND SULBACTAM 3 G: 1; 2 INJECTION, POWDER, FOR SOLUTION INTRAMUSCULAR; INTRAVENOUS at 12:30

## 2025-05-21 RX ADMIN — GUAIFENESIN SYRUP AND DEXTROMETHORPHAN 5 ML: 100; 10 SYRUP ORAL at 23:17

## 2025-05-21 RX ADMIN — GUAIFENESIN SYRUP AND DEXTROMETHORPHAN 5 ML: 100; 10 SYRUP ORAL at 13:12

## 2025-05-21 RX ADMIN — ACETAMINOPHEN 1000 MG: 500 TABLET ORAL at 08:12

## 2025-05-21 RX ADMIN — METOPROLOL TARTRATE 25 MG: 25 TABLET, FILM COATED ORAL at 05:46

## 2025-05-21 RX ADMIN — GUAIFENESIN SYRUP AND DEXTROMETHORPHAN 5 ML: 100; 10 SYRUP ORAL at 17:40

## 2025-05-21 RX ADMIN — DORNASE ALFA 5 MG: 1 SOLUTION RESPIRATORY (INHALATION) at 10:12

## 2025-05-21 RX ADMIN — ALTEPLASE 10 MG: KIT at 10:13

## 2025-05-21 RX ADMIN — AMPICILLIN AND SULBACTAM 3 G: 1; 2 INJECTION, POWDER, FOR SOLUTION INTRAMUSCULAR; INTRAVENOUS at 17:44

## 2025-05-21 RX ADMIN — METOPROLOL TARTRATE 25 MG: 25 TABLET, FILM COATED ORAL at 17:40

## 2025-05-21 ASSESSMENT — PAIN DESCRIPTION - PAIN TYPE
TYPE: ACUTE PAIN

## 2025-05-21 ASSESSMENT — ENCOUNTER SYMPTOMS
PALPITATIONS: 0
FEVER: 1
SPUTUM PRODUCTION: 0
CLAUDICATION: 0
FEVER: 0
ABDOMINAL PAIN: 0
HEMOPTYSIS: 0
DEPRESSION: 0
SHORTNESS OF BREATH: 0
NAUSEA: 0
HEARTBURN: 0
COUGH: 0
CONSTITUTIONAL NEGATIVE: 1
VOMITING: 0
DIZZINESS: 0
CHILLS: 0
ORTHOPNEA: 0

## 2025-05-21 NOTE — PROGRESS NOTES
Hospital Medicine Daily Progress Note    Date of Service  5/21/2025    Chief Complaint    Hospital Course  Marlo Gusman is a 60 y.o. male who presented 5/14/2025 with shortness of breath and right sided flank pain x 5 days.  He has a hx of HTN, DLD.  He states he feels his flank pain was from a recent upper respiratory infection he was battling for past 2 weeks with ongoing fever, chills, and coughing.      A CT chest showed a loculated right pleural effusion.  Initially he required pressor support. He was in atrial fib with RVR and hypoxic. An initial thoracentesis was unsuccessfulHe had a thoracentesis with 500cc removed with improvement of his breathing.    Interval Problem Update  No acute events overnight.  On 1L oxygen, weaning as able.  Patient feels well.  Pulmonology starting intrapleural lytic therapy today. Appreciate their management.  Chest tube in place.  CXR reviewed today showing improved right pleural effusion.  Continue antibiotics for pneumonia.      I have discussed this patient's plan of care and discharge plan at IDT rounds today with Case Management, Nursing, Nursing leadership, and other members of the IDT team.    Consultants/Specialty  Pulmonology, interventional radiology, critical care      Code Status  Full Code    Disposition  Medically Cleared  I have placed the appropriate orders for post-discharge needs.    Review of Systems  Review of Systems   Constitutional: Negative.  Negative for chills and fever.   Cardiovascular:  Positive for chest pain.   Gastrointestinal:  Negative for nausea and vomiting.   Neurological:  Negative for dizziness.        Physical Exam  Temp:  [36.8 °C (98.2 °F)-38.1 °C (100.5 °F)] 36.8 °C (98.3 °F)  Pulse:  [81-91] 91  Resp:  [17-20] 17  BP: (108-115)/(74-79) 110/74  SpO2:  [92 %-95 %] 93 %    Physical Exam  Vitals and nursing note reviewed.   Constitutional:       Appearance: Normal appearance.   HENT:      Head: Normocephalic and atraumatic.   Eyes:       Extraocular Movements: Extraocular movements intact.   Cardiovascular:      Rate and Rhythm: Normal rate.      Heart sounds: Normal heart sounds. No murmur heard.  Pulmonary:      Effort: Pulmonary effort is normal. No respiratory distress.      Breath sounds: Normal breath sounds. No wheezing.   Abdominal:      General: Abdomen is flat. Bowel sounds are normal. There is no distension.   Musculoskeletal:         General: Normal range of motion.   Skin:     General: Skin is warm and dry.   Neurological:      General: No focal deficit present.      Mental Status: He is alert. Mental status is at baseline.   Psychiatric:         Mood and Affect: Mood normal.         Thought Content: Thought content normal.         Fluids    Intake/Output Summary (Last 24 hours) at 5/21/2025 1625  Last data filed at 5/21/2025 1341  Gross per 24 hour   Intake 590 ml   Output 1185 ml   Net -595 ml        Laboratory  Recent Labs     05/19/25  0248 05/20/25  0240 05/21/25  0200   WBC 8.4 6.8 6.5   RBC 4.14* 3.66* 3.83*   HEMOGLOBIN 12.5* 11.0* 11.4*   HEMATOCRIT 36.8* 33.3* 34.4*   MCV 88.9 91.0 89.8   MCH 30.2 30.1 29.8   MCHC 34.0 33.0 33.1   RDW 42.0 42.5 42.0   PLATELETCT 377 382 397   MPV 8.7* 8.7* 8.6*     Recent Labs     05/19/25  0248 05/20/25  0240 05/21/25  0200   SODIUM 138 136 137   POTASSIUM 3.8 3.8 4.0   CHLORIDE 104 102 104   CO2 21 23 22   GLUCOSE 101* 98 111*   BUN 13 14 12   CREATININE 0.64 0.71 0.77   CALCIUM 8.2* 8.0* 8.1*                   Imaging  CT guided chest tube 5/19/2025    Assessment/Plan  * Atrial fibrillation with RVR (HCC)- (present on admission)  Assessment & Plan  Resolve of RVR  Continue metoprolol    Hypertension  Assessment & Plan  Metoprolol.  Hold outpatientHCTZrestart losartan and amlodipine as BP dictate/allows  Vitals:    05/17/25 1232   BP: 126/85   Pulse: (!) 108   Resp: 20   Temp: 36.8 °C (98.2 °F)   SpO2: 92%     Stable.    Acute hypoxic respiratory failure (HCC)- (present on  admission)  Assessment & Plan  Concern of recent URI and probable post obstructive pneumonia.  Thoracentesis with 500cc removed  Follow up on pleural effusion evaluation  Titrate oxygen  Mobilize  Antitussives as need    Mass of lower lobe of left lung- (present on admission)  Assessment & Plan  Mass vs consolidation  Follow up on pleural effusion cytology  May need future follow up CT and or biopsy.  Treating as infection at this time.    Pulmonology following    High anion gap metabolic acidosis- (present on admission)  Assessment & Plan  resolve    QUE (acute kidney injury) (HCC)- (present on admission)  Assessment & Plan  Suspected prerenal and hypovolemia  Improved with IV fluids and resolve of Afib rvr          Pleural effusion on right- (present on admission)  Assessment & Plan  Had a thoracentesis.  F/U post thoracentesis xray still with opacification of right lower lung  Pulmonology following, starting intrapleural lytic therapy today  Effusion improving    Sepsis (HCC)- (present on admission)  Assessment & Plan  S/p pressors and has had IV fluid resuscitation  Monitor I/O's, labs, and vitals.    Follow pleural fluid cultures         VTE prophylaxis: VTE Selection    I have performed a physical exam and reviewed and updated ROS and Plan today (5/21/2025). In review of yesterday's note (5/20/2025), there are no changes except as documented above.

## 2025-05-21 NOTE — PROGRESS NOTES
Hospital Medicine Daily Progress Note    Date of Service  5/20/2025    Chief Complaint    Hospital Course  Marlo Gusman is a 60 y.o. male who presented 5/14/2025 with shortness of breath and right sided flank pain x 5 days.  He has a hx of HTN, DLD.  He states he feels his flank pain was from a recent upper respiratory infection he was battling for past 2 weeks with ongoing fever, chills, and coughing.      A CT chest showed a loculated right pleural effusion.  Initially he required pressor support. He was in atrial fib with RVR and hypoxic. An initial thoracentesis was unsuccessfulHe had a thoracentesis with 500cc removed with improvement of his breathing.    Interval Problem Update  No acute events overnight.  Patient feels well, reports minimal pain by chest tube site.  On 2L oxygen, wean as able.  Monitoring chest tube output.  Pulmonology following, plan for intrapleural lytic therapy to start tomorrow.  CXR reviewed today showing persistent pleural effusion on right.  Continue antibiotics for pneumonia.      I have discussed this patient's plan of care and discharge plan at IDT rounds today with Case Management, Nursing, Nursing leadership, and other members of the IDT team.    Consultants/Specialty  Pulmonology, interventional radiology, critical care      Code Status  Full Code    Disposition  Medically Cleared  I have placed the appropriate orders for post-discharge needs.    Review of Systems  Review of Systems   Constitutional: Negative.  Negative for chills and fever.   Cardiovascular:  Positive for chest pain.   Gastrointestinal:  Negative for nausea and vomiting.   Neurological:  Negative for dizziness.        Physical Exam  Temp:  [36.6 °C (97.8 °F)-37.7 °C (99.9 °F)] 37.6 °C (99.7 °F)  Pulse:  [83-98] 89  Resp:  [16-20] 16  BP: (104-135)/(64-86) 113/79  SpO2:  [93 %-95 %] 95 %    Physical Exam  Vitals and nursing note reviewed.   Constitutional:       Appearance: Normal appearance.   HENT:      Head:  Normocephalic and atraumatic.   Eyes:      Extraocular Movements: Extraocular movements intact.   Cardiovascular:      Rate and Rhythm: Normal rate.      Heart sounds: Normal heart sounds. No murmur heard.  Pulmonary:      Effort: Pulmonary effort is normal. No respiratory distress.      Breath sounds: Normal breath sounds. No wheezing.   Abdominal:      General: Abdomen is flat. Bowel sounds are normal. There is no distension.   Musculoskeletal:         General: Normal range of motion.   Skin:     General: Skin is warm and dry.   Neurological:      General: No focal deficit present.      Mental Status: He is alert. Mental status is at baseline.   Psychiatric:         Mood and Affect: Mood normal.         Thought Content: Thought content normal.         Fluids    Intake/Output Summary (Last 24 hours) at 5/20/2025 1912  Last data filed at 5/20/2025 1800  Gross per 24 hour   Intake 660 ml   Output 240 ml   Net 420 ml        Laboratory  Recent Labs     05/18/25  0000 05/19/25  0248 05/20/25  0240   WBC 9.1  9.1 8.4 6.8   RBC 3.99*  3.99* 4.14* 3.66*   HEMOGLOBIN 12.0*  12.0* 12.5* 11.0*   HEMATOCRIT 35.0*  35.0* 36.8* 33.3*   MCV 87.7  87.7 88.9 91.0   MCH 30.1  30.1 30.2 30.1   MCHC 34.3  34.3 34.0 33.0   RDW 41.3  41.3 42.0 42.5   PLATELETCT 381  381 377 382   MPV 9.3  9.3 8.7* 8.7*     Recent Labs     05/18/25  0000 05/19/25  0248 05/20/25  0240   SODIUM 137 138 136   POTASSIUM 3.5* 3.8 3.8   CHLORIDE 101 104 102   CO2 25 21 23   GLUCOSE 110* 101* 98   BUN 14 13 14   CREATININE 0.72 0.64 0.71   CALCIUM 8.2* 8.2* 8.0*                   Imaging  CT guided chest tube 5/19/2025    Assessment/Plan  * Atrial fibrillation with RVR (HCC)- (present on admission)  Assessment & Plan  Resolve of RVR  Continue metoprolol    Hypertension  Assessment & Plan  Metoprolol.  Hold outpatientHCTZrestart losartan and amlodipine as BP dictate/allows  Vitals:    05/17/25 1232   BP: 126/85   Pulse: (!) 108   Resp: 20   Temp: 36.8  °C (98.2 °F)   SpO2: 92%     Stable.    Acute hypoxic respiratory failure (HCC)- (present on admission)  Assessment & Plan  Concern of recent URI and probable post obstructive pneumonia.  Thoracentesis with 500cc removed  Follow up on pleural effusion evaluation  Titrate oxygen  Mobilize  Antitussives as need    Mass of lower lobe of left lung- (present on admission)  Assessment & Plan  Mass vs consolidation  Follow up on pleural effusion cytology  May need future follow up CT and or biopsy.  Treating as infection at this time.    Pulmonology following    High anion gap metabolic acidosis- (present on admission)  Assessment & Plan  resolve    QUE (acute kidney injury) (HCC)- (present on admission)  Assessment & Plan  Suspected prerenal and hypovolemia  Improved with IV fluids and resolve of Afib rvr          Pleural effusion on right- (present on admission)  Assessment & Plan  Had a thoracentesis.  F/U post thoracentesis xray still with opacification of right lower lung  Pulmonology following, plan for intrapleural lytic therapy to start tomorrow      Sepsis (HCC)- (present on admission)  Assessment & Plan  S/p pressors and has had IV fluid resuscitation  Monitor I/O's, labs, and vitals.    Follow pleural fluid cultures         VTE prophylaxis: VTE Selection    I have performed a physical exam and reviewed and updated ROS and Plan today (5/20/2025). In review of yesterday's note (5/19/2025), there are no changes except as documented above.

## 2025-05-21 NOTE — PROCEDURES
Procedure Date: 5/21/2025    Procedure: Intrapleual Fibrinolytics (07745)    Physician: Nena Walden MD    Anesthesia Type: N/A    Indication: complicated parapneumonic pleural effusion    Time Out: Patient was identified with two patient identifiers and site was confirmed.    Pre-Op Dx: complicated parapneumonic pleural effusion    Post-Op Dx: complicated parapneumonic pleural effusion    Medications:   Alteplase 10mg  Dornase Alpha 5mg     Description:    10mg of Alteplase and 5mg of Dornase were prepared by pharmacy in normal saline and maintained chilled until administration.  These solutions were mixed at bedside and the total fluid instillation was 60cc.  Patient was positioned in LEFT lateral decubitous position.  Chest tube connections were prepped and cleaned. Alteplase/Dornase solution was drawn up in the appropriate syringe and medications were then injected into the chest tube, avoiding the introduction of air. Chest tube connection to pleurvac was then reconnected. Chest tube was left clamped/closed. Patient was instructed to lie in lateral position x2 hours (60 minutes on the right and 60 minutes on the left).  Chest tube will be placed back on suction in 2 hours.    Complications: none    F/U: routine chest tube care      Abby Walden MD  Pulmonary and Critical Care Medicine  [facility]

## 2025-05-21 NOTE — PROGRESS NOTES
"Radiology Progress Note   Author: BENJAMIN Garcia Date & Time created: 5/21/2025  12:16 PM   Date of admission  5/14/2025  Note to reader: this note follows the APSO format rather than the historical SOAP format. Assessment and plan located at the top of the note for ease of use.    Chief Complaint  60 y.o. male admitted 5/14/2025 with   Chief Complaint   Patient presents with    Flank Pain     Right sided flank pain x 5 days, pt denies dysuria but reports pain is darker than normal. +nausea.        HPI  Patient \"is a 60 y.o. male who presented 5/14/2025 with shortness of breath and right sided flank pain x 5 days.  He has a hx of HTN, DLD.  He states he feels his flank pain was from a recent upper respiratory infection he was battling for past 2 weeks with ongoing fever, chills, and coughing. A CT chest showed a loculated right pleural effusion.  Initially he required pressor support. He was in atrial fib with RVR and hypoxic. An initial thoracentesis was unsuccessful. He had a thoracentesis with 500cc removed with improvement of his breathing.\" - MD Chris. IR was consulted for Right chest tube placement r/t loculation of right pleural effusion. IR Dr. Gallardo performed Right chest tube placement on 5/19/25.    Interval History:   05/20/25 - IR Right posterior 10F Chest tube to Pleur-Evac with 590 mL serosanguineous output in the last 24 hours. Chest tube to - 20 cm H2O suction; no leaks appreciated. Labs reviewed; WBC 6.8, H/H 11/33.3, Cr 0.71. Cultures pending. CXR this morning shows, \"Interval placement of small caliber RIGHT basilar chest tube; No pneumothorax; & Persistent moderate RIGHT pleural fluid collection with associated consolidation.\" SpO2 95% on room air. I discussed anticipated plan of care with patient. I discussed patient with hospital nursing staff and I reviewed IDT notes. Anticipate possible lytic treatment by pulm.    05/21/25 - IR Right posterior 10F Chest tube to Pleur-Evac with " 160 mL serosanguineous output in the last 24 hours. Chest tube clamped s/p lytic treatment by Pulm with patient laying with left side down; no leaks appreciated at time of assessment. Labs reviewed; WBC 6.5, H/H 11.4/34.4, Cr 0.77. Pleural fluid cultures (5/19) pending. CXR this morning delayed pending completion of lytic treatment. SpO2 94% on 1 Lpm Oxygen via NC. I discussed anticipated plan of care with patient. I reviewed IDT notes.     Assessment/Plan     Principal Problem:    Atrial fibrillation with RVR (HCC)  Active Problems:    Sepsis (HCC)    Pleural effusion on right    QUE (acute kidney injury) (HCC)    High anion gap metabolic acidosis    Mass of lower lobe of left lung    Acute hypoxic respiratory failure (HCC)    Hypertension    Shock (HCC)    Plan IR  - Continue Right posterior chest tube to suction -20 cm H2O; defer to pulm during/after lytic treatments if/when applicable.  - Thoracentesis fluid cultures (5/16) pending - NGTD.  - Pleural fluid cultures (5/19) pending - NGTD.  - Serial CXR  - Hospitalist and Pulmonary following.  - lytic therapy #1: 5/21  - Recommend fu CT in 5-7 days or when output decreases to approx 100 mL out/24 hours  - Continue to monitor output, labs, and VS.  -  -Thank you for allowing Interventional Radiology team to participate in the patients care, if any additional care or requests are needed in the future please do not hesitate to call or place IR order.        Review of Systems  Physical Exam   Review of Systems   Constitutional:  Negative for chills, fever and malaise/fatigue.   Respiratory:  Negative for cough and shortness of breath.    Cardiovascular:  Positive for chest pain (minor discomfort at chest tube insertion site).   Gastrointestinal:  Negative for abdominal pain, nausea and vomiting.      Vitals:    05/21/25 0719   BP: 108/74   Pulse: 81   Resp: 19   Temp: 36.8 °C (98.2 °F)   SpO2: 94%     Physical Exam  Vitals and nursing note reviewed.   Constitutional:        General: He is not in acute distress.  Cardiovascular:      Rate and Rhythm: Normal rate.   Pulmonary:      Effort: Pulmonary effort is normal. No respiratory distress.      Comments: IR Right Posterior 10F Chest tube in place.  Abdominal:      Palpations: Abdomen is soft.      Tenderness: There is no abdominal tenderness.   Skin:     General: Skin is warm and dry.   Neurological:      General: No focal deficit present.      Mental Status: He is alert and oriented to person, place, and time.   Psychiatric:         Mood and Affect: Mood normal.         Behavior: Behavior normal.          Labs    Recent Labs     05/19/25 0248 05/20/25 0240 05/21/25  0200   WBC 8.4 6.8 6.5   RBC 4.14* 3.66* 3.83*   HEMOGLOBIN 12.5* 11.0* 11.4*   HEMATOCRIT 36.8* 33.3* 34.4*   MCV 88.9 91.0 89.8   MCH 30.2 30.1 29.8   MCHC 34.0 33.0 33.1   RDW 42.0 42.5 42.0   PLATELETCT 377 382 397   MPV 8.7* 8.7* 8.6*     Recent Labs     05/19/25 0248 05/20/25  0240 05/21/25  0200   SODIUM 138 136 137   POTASSIUM 3.8 3.8 4.0   CHLORIDE 104 102 104   CO2 21 23 22   GLUCOSE 101* 98 111*   BUN 13 14 12   CREATININE 0.64 0.71 0.77   CALCIUM 8.2* 8.0* 8.1*     Recent Labs     05/19/25 0248 05/20/25  0240 05/21/25  0200   ALBUMIN 2.7* 2.6* 2.6*   TBILIRUBIN 0.5 0.3 0.3   ALKPHOSPHAT 157* 178* 180*   TOTPROTEIN 6.1 5.8* 5.9*   ALTSGPT 25 26 49   ASTSGOT 20 24 47*   CREATININE 0.64 0.71 0.77     DX-CHEST-2 VIEWS   Final Result      1.  Interval placement of small caliber RIGHT basilar chest tube.   2.  No pneumothorax.   3.  Persistent moderate RIGHT pleural fluid collection with associated consolidation.      CT-CHEST TUBE PLACEMENT (EMPYEMA AND PNEUMO)   Final Result      Successful CT GUIDED right  CHEST TUBE PLACEMENT.      CT-CHEST (THORAX) W/O   Final Result      1.  Loculated right pleural effusion has increased from the prior CT. Adjacent airspace opacification may represent atelectasis or pneumonia.   2.  Small left pleural effusion with  "adjacent compressive atelectasis.   3.  Mild mediastinal adenopathy is likely reactive.         DX-CHEST-PORTABLE (1 VIEW)   Final Result      1.  Persistent right pleural effusion and associated atelectasis/consolidation.   2.  No pneumothorax status post right-sided thoracentesis.      US-THORACENTESIS PUNCTURE RIGHT   Final Result      1. Ultrasound guided right sided diagnostic and therapeutic thoracentesis.      2. 500 mL of fluid withdrawn.      DX-CHEST-PORTABLE (1 VIEW)   Final Result         1. Moderate right pleural effusion and atelectasis unchanged.                  EC-ECHOCARDIOGRAM COMPLETE W/O CONT   Final Result      DX-CHEST-PORTABLE (1 VIEW)   Final Result      1.  Hypoinflation.   2.  Moderate right pleural effusion and underlying atelectasis or consolidation. As previously mentioned, possible underlying pneumonia.   3.  Minimal left pleural effusion and subsegmental atelectatic change.      DX-CHEST-LIMITED (1 VIEW)   Final Result      Airspace opacity in the right lower lobe, concerning for pneumonia.      CT-CTA COMPLETE THORACOABDOMINAL AORTA   Final Result      1.  No evidence for aortic aneurysm or dissection.   2.  Loculated right pleural effusion with associated compressive atelectasis and consolidation.   3.  Masslike area of consolidation in the left posterior medial costophrenic angle. This may be due to infection or neoplasm. Clinical correlation and follow-up is needed.   4.  Hepatomegaly with fatty infiltration of the liver.                        DX-CHEST-2 VIEWS    (Results Pending)     INR   Date Value Ref Range Status   05/14/2025 1.15 (H) 0.87 - 1.13 Final     Comment:     INR - Non-therapeutic Reference Range: 0.87-1.13  INR - Therapeutic Reference Range: 2.0-4.0       No results found for: \"POCINR\"     Intake/Output Summary (Last 24 hours) at 5/20/2025 0948  Last data filed at 5/20/2025 0645  Gross per 24 hour   Intake --   Output 590 ml   Net -590 ml      I have personally " reviewed the above labs and imaging      I have performed a physical exam and reviewed and updated ROS and Plan today (5/21/2025).     35 minutes in directly providing and coordinating care and extensive data review.  No time overlap and excludes procedures.

## 2025-05-21 NOTE — PROGRESS NOTES
Rec'd report from day shift RN. Assumed pt care. Assessment completed. AA&OX4. Denies pain at this time. Chest tube to right upper/backside is patent, scant blood on gauze dressing at insertion site.  in use. Pt ambulates to the bathroom with SBA, maintains steady gait. Bed in lowest position, bed locked, treaded socks in place, RN and CNA numbers provided, call light within reach.

## 2025-05-21 NOTE — CARE PLAN
The patient is Stable - Low risk of patient condition declining or worsening    Shift Goals  Clinical Goals: Pt will remain at comfort level for pain. Monitor chest tube output.  Patient Goals: Rest  Family Goals: PERLITA    Pt is A&Ox4, on RA, and stable. Monitor chest tube output. All needs are met and questions answered at this time.    Progress made toward(s) clinical / shift goals:    Problem: Knowledge Deficit - Standard  Goal: Patient and family/care givers will demonstrate understanding of plan of care, disease process/condition, diagnostic tests and medications  Outcome: Progressing  Note: Pt verbalizes understanding of POC and status.     Problem: Pain - Standard  Goal: Alleviation of pain or a reduction in pain to the patient’s comfort goal  Outcome: Progressing  Note: Pt pain has been managed per MAR.       Patient is not progressing towards the following goals:

## 2025-05-21 NOTE — CARE PLAN
The patient is Stable - Low risk of patient condition declining or worsening    Shift Goals  Clinical Goals: Pt will report a pain level less than 8/10 after pain med intervention  Patient Goals: pain control  Family Goals: PERLITA    Progress made toward(s) clinical / shift goals:  Pt reports a pain level of 4/10 after PRN pain med intervention.    Patient is not progressing towards the following goals:

## 2025-05-21 NOTE — PROGRESS NOTES
[facility] Internal Medicine Daily Progress Note    Date of Service  5/21/2025    Attending: Nena Walden MD  Senior Resident: Dr. David Mendoza MD  Contact Number: [phone]    Chief Complaint  Shortness of breath    Hospital Course  This is 60 years old male with past medical history of hypertension, hyperlipidemia who presented to ED with severe right-sided chest pain, fever, chills and diaphoresis.  Patient denied any smoking history, sick contact, recent travel, history of similar condition.     In the ED, patient was found to have A-fib with RVR in the 140s and mildly hypoxic.  He is CTA chest revealed loculated right pleural effusion with adjacent atelectasis and patient was admitted to ICU.     Pulmonary was consulted for declined pleural effusion and initial thoracocentesis was not successful but subsequent was able to get 500 cc on 5/16/2025 with a glucose of 12, total protein 3.9, LDH 1803.     Patient had chest tube inserted on 5/19/2025 and drained 590 so far.    On 5/20/2025 total chest tube output is 160     Interval Problem Update  No acute event overnight  Patient reports feeling okay, denied any shortness of breath or chest pain and his chest tube output was 160 during last 24-hour  Patient wean of oxygen and his oxygen saturation 95% on room air    Patient vitals stable apart from 1 time of Tmax of 100.5 F    Blood work: White blood cell within normal limit  Microbiology: Cultures still negative    Plan for intrapleural lytic therapy to start today    Code Status  Full Code        Review of Systems  Review of Systems   Constitutional:  Positive for fever and malaise/fatigue.   Respiratory:  Negative for cough, hemoptysis and sputum production.    Cardiovascular:  Positive for chest pain. Negative for palpitations, orthopnea and claudication.   Gastrointestinal:  Negative for heartburn and nausea.   Genitourinary:  Negative for dysuria and urgency.   Neurological:  Negative for dizziness.    Psychiatric/Behavioral:  Negative for depression.         Physical Exam  Temp:  [36.8 °C (98.2 °F)-38.1 °C (100.5 °F)] 36.8 °C (98.2 °F)  Pulse:  [81-89] 81  Resp:  [16-20] 19  BP: (108-115)/(74-79) 108/74  SpO2:  [92 %-95 %] 94 %    Physical Exam  Constitutional:       Appearance: Normal appearance.   Cardiovascular:      Rate and Rhythm: Normal rate and regular rhythm.   Pulmonary:      Effort: Pulmonary effort is normal.      Breath sounds: Normal breath sounds.   Abdominal:      General: Abdomen is flat. Bowel sounds are normal.      Palpations: Abdomen is soft.   Musculoskeletal:         General: Normal range of motion.   Skin:     General: Skin is warm.      Capillary Refill: Capillary refill takes less than 2 seconds.   Neurological:      General: No focal deficit present.      Mental Status: He is alert and oriented to person, place, and time.         Fluids    Intake/Output Summary (Last 24 hours) at 5/21/2025 1152  Last data filed at 5/21/2025 0906  Gross per 24 hour   Intake 710 ml   Output 710 ml   Net 0 ml       Laboratory  Recent Labs     05/19/25  0248 05/20/25  0240 05/21/25  0200   WBC 8.4 6.8 6.5   RBC 4.14* 3.66* 3.83*   HEMOGLOBIN 12.5* 11.0* 11.4*   HEMATOCRIT 36.8* 33.3* 34.4*   MCV 88.9 91.0 89.8   MCH 30.2 30.1 29.8   MCHC 34.0 33.0 33.1   RDW 42.0 42.5 42.0   PLATELETCT 377 382 397   MPV 8.7* 8.7* 8.6*     Recent Labs     05/19/25  0248 05/20/25  0240 05/21/25  0200   SODIUM 138 136 137   POTASSIUM 3.8 3.8 4.0   CHLORIDE 104 102 104   CO2 21 23 22   GLUCOSE 101* 98 111*   BUN 13 14 12   CREATININE 0.64 0.71 0.77   CALCIUM 8.2* 8.0* 8.1*                   Imaging  DX-CHEST-2 VIEWS   Final Result      1.  Interval placement of small caliber RIGHT basilar chest tube.   2.  No pneumothorax.   3.  Persistent moderate RIGHT pleural fluid collection with associated consolidation.      CT-CHEST TUBE PLACEMENT (EMPYEMA AND PNEUMO)   Final Result      Successful CT GUIDED right  CHEST TUBE  PLACEMENT.      CT-CHEST (THORAX) W/O   Final Result      1.  Loculated right pleural effusion has increased from the prior CT. Adjacent airspace opacification may represent atelectasis or pneumonia.   2.  Small left pleural effusion with adjacent compressive atelectasis.   3.  Mild mediastinal adenopathy is likely reactive.         DX-CHEST-PORTABLE (1 VIEW)   Final Result      1.  Persistent right pleural effusion and associated atelectasis/consolidation.   2.  No pneumothorax status post right-sided thoracentesis.      US-THORACENTESIS PUNCTURE RIGHT   Final Result      1. Ultrasound guided right sided diagnostic and therapeutic thoracentesis.      2. 500 mL of fluid withdrawn.      DX-CHEST-PORTABLE (1 VIEW)   Final Result         1. Moderate right pleural effusion and atelectasis unchanged.                  EC-ECHOCARDIOGRAM COMPLETE W/O CONT   Final Result      DX-CHEST-PORTABLE (1 VIEW)   Final Result      1.  Hypoinflation.   2.  Moderate right pleural effusion and underlying atelectasis or consolidation. As previously mentioned, possible underlying pneumonia.   3.  Minimal left pleural effusion and subsegmental atelectatic change.      DX-CHEST-LIMITED (1 VIEW)   Final Result      Airspace opacity in the right lower lobe, concerning for pneumonia.      CT-CTA COMPLETE THORACOABDOMINAL AORTA   Final Result      1.  No evidence for aortic aneurysm or dissection.   2.  Loculated right pleural effusion with associated compressive atelectasis and consolidation.   3.  Masslike area of consolidation in the left posterior medial costophrenic angle. This may be due to infection or neoplasm. Clinical correlation and follow-up is needed.   4.  Hepatomegaly with fatty infiltration of the liver.                        DX-CHEST-2 VIEWS    (Results Pending)        Assessment/Plan  Problem Representation:    Pleural effusion on right- (present on admission)  Assessment & Plan  Small right-sided pleural effusion associated  with atelectasis unknown the cause, patient denied any benefit symptom, smoking history.  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  Initial thoracocentesis was unsuccessful but subsequently was able to get 500 cc with low glucose level  Fluid removed 5/16/2025 (Glucose 12, TP 3.9, LDH 1803_ == infected and exudative  Chest tube draining 160  Patient received first dose of intrapleural lytic therapy today including tPA and DNase  Continue chest tube to suction  Pulmonary hygiene with OOB and early mobility  Continue with antibiotic Unasyn  Chest x-ray the morning  Follow-up with cultures  Monitor chest tube output-patient may need intrapleural fibrinolytic based on his chest tube output and chest x-rays  Will continue follow-up

## 2025-05-21 NOTE — PROGRESS NOTES
Report received from [facility] PAL Shell and assumed patient care at 0700. Patient is A&Ox 4, on 1L via NC, and awake and alert. Patient reporting a pain level of 4/10, medicated per MAR. Chest tube present on right upper back, dressing in place CDI. Call light within reach and bed in lowest position. Reinforced the need to call for assistance. Plan of care discussed and patient does not have any further needs at this time.

## 2025-05-22 ENCOUNTER — APPOINTMENT (OUTPATIENT)
Dept: RADIOLOGY | Facility: MEDICAL CENTER | Age: 60
DRG: 871 | End: 2025-05-22
Attending: STUDENT IN AN ORGANIZED HEALTH CARE EDUCATION/TRAINING PROGRAM
Payer: COMMERCIAL

## 2025-05-22 LAB
ALBUMIN SERPL BCP-MCNC: 2.8 G/DL (ref 3.2–4.9)
ALBUMIN/GLOB SERPL: 0.9 G/DL
ALP SERPL-CCNC: 162 U/L (ref 30–99)
ALT SERPL-CCNC: 49 U/L (ref 2–50)
ANION GAP SERPL CALC-SCNC: 11 MMOL/L (ref 7–16)
AST SERPL-CCNC: 34 U/L (ref 12–45)
BACTERIA FLD AEROBE CULT: NORMAL
BASOPHILS # BLD AUTO: 0.4 % (ref 0–1.8)
BASOPHILS # BLD: 0.03 K/UL (ref 0–0.12)
BILIRUB SERPL-MCNC: 0.3 MG/DL (ref 0.1–1.5)
BUN SERPL-MCNC: 12 MG/DL (ref 8–22)
CALCIUM ALBUM COR SERPL-MCNC: 9.2 MG/DL (ref 8.5–10.5)
CALCIUM SERPL-MCNC: 8.2 MG/DL (ref 8.5–10.5)
CHLORIDE SERPL-SCNC: 104 MMOL/L (ref 96–112)
CO2 SERPL-SCNC: 23 MMOL/L (ref 20–33)
CREAT SERPL-MCNC: 0.73 MG/DL (ref 0.5–1.4)
EOSINOPHIL # BLD AUTO: 0.18 K/UL (ref 0–0.51)
EOSINOPHIL NFR BLD: 2.5 % (ref 0–6.9)
ERYTHROCYTE [DISTWIDTH] IN BLOOD BY AUTOMATED COUNT: 43.5 FL (ref 35.9–50)
GFR SERPLBLD CREATININE-BSD FMLA CKD-EPI: 103 ML/MIN/1.73 M 2
GLOBULIN SER CALC-MCNC: 3.2 G/DL (ref 1.9–3.5)
GLUCOSE SERPL-MCNC: 95 MG/DL (ref 65–99)
GRAM STN SPEC: NORMAL
HCT VFR BLD AUTO: 34.1 % (ref 42–52)
HGB BLD-MCNC: 11 G/DL (ref 14–18)
IMM GRANULOCYTES # BLD AUTO: 0.05 K/UL (ref 0–0.11)
IMM GRANULOCYTES NFR BLD AUTO: 0.7 % (ref 0–0.9)
LYMPHOCYTES # BLD AUTO: 1.29 K/UL (ref 1–4.8)
LYMPHOCYTES NFR BLD: 17.8 % (ref 22–41)
MAGNESIUM SERPL-MCNC: 2.1 MG/DL (ref 1.5–2.5)
MCH RBC QN AUTO: 29.7 PG (ref 27–33)
MCHC RBC AUTO-ENTMCNC: 32.3 G/DL (ref 32.3–36.5)
MCV RBC AUTO: 92.2 FL (ref 81.4–97.8)
MONOCYTES # BLD AUTO: 0.61 K/UL (ref 0–0.85)
MONOCYTES NFR BLD AUTO: 8.4 % (ref 0–13.4)
NEUTROPHILS # BLD AUTO: 5.08 K/UL (ref 1.82–7.42)
NEUTROPHILS NFR BLD: 70.2 % (ref 44–72)
NRBC # BLD AUTO: 0 K/UL
NRBC BLD-RTO: 0 /100 WBC (ref 0–0.2)
PLATELET # BLD AUTO: 386 K/UL (ref 164–446)
PMV BLD AUTO: 9 FL (ref 9–12.9)
POTASSIUM SERPL-SCNC: 4.2 MMOL/L (ref 3.6–5.5)
PROT SERPL-MCNC: 6 G/DL (ref 6–8.2)
RBC # BLD AUTO: 3.7 M/UL (ref 4.7–6.1)
SIGNIFICANT IND 70042: NORMAL
SITE SITE: NORMAL
SODIUM SERPL-SCNC: 138 MMOL/L (ref 135–145)
SOURCE SOURCE: NORMAL
WBC # BLD AUTO: 7.2 K/UL (ref 4.8–10.8)

## 2025-05-22 PROCEDURE — 3E0L317 INTRODUCTION OF OTHER THROMBOLYTIC INTO PLEURAL CAVITY, PERCUTANEOUS APPROACH: ICD-10-PCS | Performed by: STUDENT IN AN ORGANIZED HEALTH CARE EDUCATION/TRAINING PROGRAM

## 2025-05-22 PROCEDURE — 700105 HCHG RX REV CODE 258: Performed by: STUDENT IN AN ORGANIZED HEALTH CARE EDUCATION/TRAINING PROGRAM

## 2025-05-22 PROCEDURE — 99232 SBSQ HOSP IP/OBS MODERATE 35: CPT | Performed by: STUDENT IN AN ORGANIZED HEALTH CARE EDUCATION/TRAINING PROGRAM

## 2025-05-22 PROCEDURE — 80053 COMPREHEN METABOLIC PANEL: CPT

## 2025-05-22 PROCEDURE — 83735 ASSAY OF MAGNESIUM: CPT

## 2025-05-22 PROCEDURE — A9270 NON-COVERED ITEM OR SERVICE: HCPCS

## 2025-05-22 PROCEDURE — 700101 HCHG RX REV CODE 250: Mod: JZ | Performed by: STUDENT IN AN ORGANIZED HEALTH CARE EDUCATION/TRAINING PROGRAM

## 2025-05-22 PROCEDURE — A9270 NON-COVERED ITEM OR SERVICE: HCPCS | Performed by: STUDENT IN AN ORGANIZED HEALTH CARE EDUCATION/TRAINING PROGRAM

## 2025-05-22 PROCEDURE — 99232 SBSQ HOSP IP/OBS MODERATE 35: CPT | Mod: 25,GC | Performed by: STUDENT IN AN ORGANIZED HEALTH CARE EDUCATION/TRAINING PROGRAM

## 2025-05-22 PROCEDURE — 770006 HCHG ROOM/CARE - MED/SURG/GYN SEMI*

## 2025-05-22 PROCEDURE — 85025 COMPLETE CBC W/AUTO DIFF WBC: CPT

## 2025-05-22 PROCEDURE — 32562 LYSE CHEST FIBRIN SUBQ DAY: CPT | Performed by: STUDENT IN AN ORGANIZED HEALTH CARE EDUCATION/TRAINING PROGRAM

## 2025-05-22 PROCEDURE — 700102 HCHG RX REV CODE 250 W/ 637 OVERRIDE(OP)

## 2025-05-22 PROCEDURE — 700102 HCHG RX REV CODE 250 W/ 637 OVERRIDE(OP): Performed by: STUDENT IN AN ORGANIZED HEALTH CARE EDUCATION/TRAINING PROGRAM

## 2025-05-22 PROCEDURE — A9270 NON-COVERED ITEM OR SERVICE: HCPCS | Performed by: INTERNAL MEDICINE

## 2025-05-22 PROCEDURE — C1729 CATH, DRAINAGE: HCPCS

## 2025-05-22 PROCEDURE — 700111 HCHG RX REV CODE 636 W/ 250 OVERRIDE (IP): Mod: JZ | Performed by: STUDENT IN AN ORGANIZED HEALTH CARE EDUCATION/TRAINING PROGRAM

## 2025-05-22 PROCEDURE — 700105 HCHG RX REV CODE 258: Performed by: FAMILY MEDICINE

## 2025-05-22 PROCEDURE — 71045 X-RAY EXAM CHEST 1 VIEW: CPT

## 2025-05-22 PROCEDURE — 36415 COLL VENOUS BLD VENIPUNCTURE: CPT

## 2025-05-22 PROCEDURE — 700102 HCHG RX REV CODE 250 W/ 637 OVERRIDE(OP): Performed by: INTERNAL MEDICINE

## 2025-05-22 PROCEDURE — 700111 HCHG RX REV CODE 636 W/ 250 OVERRIDE (IP): Mod: JZ | Performed by: FAMILY MEDICINE

## 2025-05-22 RX ADMIN — GUAIFENESIN SYRUP AND DEXTROMETHORPHAN 5 ML: 100; 10 SYRUP ORAL at 17:25

## 2025-05-22 RX ADMIN — GUAIFENESIN SYRUP AND DEXTROMETHORPHAN 5 ML: 100; 10 SYRUP ORAL at 23:34

## 2025-05-22 RX ADMIN — METOPROLOL TARTRATE 25 MG: 25 TABLET, FILM COATED ORAL at 17:24

## 2025-05-22 RX ADMIN — GUAIFENESIN SYRUP AND DEXTROMETHORPHAN 5 ML: 100; 10 SYRUP ORAL at 11:48

## 2025-05-22 RX ADMIN — AMPICILLIN AND SULBACTAM 3 G: 1; 2 INJECTION, POWDER, FOR SOLUTION INTRAMUSCULAR; INTRAVENOUS at 17:30

## 2025-05-22 RX ADMIN — AMPICILLIN AND SULBACTAM 3 G: 1; 2 INJECTION, POWDER, FOR SOLUTION INTRAMUSCULAR; INTRAVENOUS at 06:15

## 2025-05-22 RX ADMIN — METOPROLOL TARTRATE 25 MG: 25 TABLET, FILM COATED ORAL at 06:14

## 2025-05-22 RX ADMIN — GUAIFENESIN SYRUP AND DEXTROMETHORPHAN 5 ML: 100; 10 SYRUP ORAL at 06:14

## 2025-05-22 RX ADMIN — OXYCODONE 2.5 MG: 5 TABLET ORAL at 20:37

## 2025-05-22 RX ADMIN — AMPICILLIN AND SULBACTAM 3 G: 1; 2 INJECTION, POWDER, FOR SOLUTION INTRAMUSCULAR; INTRAVENOUS at 11:57

## 2025-05-22 RX ADMIN — ALTEPLASE 10 MG: KIT at 15:20

## 2025-05-22 RX ADMIN — AMPICILLIN AND SULBACTAM 3 G: 1; 2 INJECTION, POWDER, FOR SOLUTION INTRAMUSCULAR; INTRAVENOUS at 23:37

## 2025-05-22 RX ADMIN — DORNASE ALFA 5 MG: 1 SOLUTION RESPIRATORY (INHALATION) at 15:20

## 2025-05-22 RX ADMIN — ACETAMINOPHEN 1000 MG: 500 TABLET ORAL at 11:48

## 2025-05-22 ASSESSMENT — PAIN DESCRIPTION - PAIN TYPE
TYPE: ACUTE PAIN

## 2025-05-22 ASSESSMENT — ENCOUNTER SYMPTOMS
SPUTUM PRODUCTION: 0
COUGH: 0
CHILLS: 0
NAUSEA: 0
WEIGHT LOSS: 0
DEPRESSION: 0
HEMOPTYSIS: 0
ORTHOPNEA: 0
ABDOMINAL PAIN: 0
CLAUDICATION: 0
DIZZINESS: 0
HEARTBURN: 0
VOMITING: 0
SHORTNESS OF BREATH: 0
CONSTITUTIONAL NEGATIVE: 1
MYALGIAS: 0
FEVER: 0
PALPITATIONS: 0

## 2025-05-22 NOTE — CARE PLAN
The patient is Stable - Low risk of patient condition declining or worsening    Shift Goals  Clinical Goals: Pt will report a pain level less than 6/10 after PRN pain medication.  Patient Goals: rest  Family Goals: PERLITA    Progress made toward(s) clinical / shift goals:  No complaints of pain during the night.    Patient is not progressing towards the following goals:

## 2025-05-22 NOTE — PROGRESS NOTES
"Radiology Progress Note   Author: BENJAMIN Garcia Date & Time created: 5/22/2025  9:46 AM   Date of admission  5/14/2025  Note to reader: this note follows the APSO format rather than the historical SOAP format. Assessment and plan located at the top of the note for ease of use.    Chief Complaint  60 y.o. male admitted 5/14/2025 with   Chief Complaint   Patient presents with    Flank Pain     Right sided flank pain x 5 days, pt denies dysuria but reports pain is darker than normal. +nausea.        HPI  Patient \"is a 60 y.o. male who presented 5/14/2025 with shortness of breath and right sided flank pain x 5 days.  He has a hx of HTN, DLD.  He states he feels his flank pain was from a recent upper respiratory infection he was battling for past 2 weeks with ongoing fever, chills, and coughing. A CT chest showed a loculated right pleural effusion.  Initially he required pressor support. He was in atrial fib with RVR and hypoxic. An initial thoracentesis was unsuccessful. He had a thoracentesis with 500cc removed with improvement of his breathing.\" - MD Chris. IR was consulted for Right chest tube placement r/t loculation of right pleural effusion. IR Dr. Gallardo performed Right chest tube placement on 5/19/25.    Interval History:   05/20/25 - IR Right posterior 10F Chest tube to Pleur-Evac with 590 mL serosanguineous output in the last 24 hours. Chest tube to - 20 cm H2O suction; no leaks appreciated. Labs reviewed; WBC 6.8, H/H 11/33.3, Cr 0.71. Cultures pending. CXR this morning shows, \"Interval placement of small caliber RIGHT basilar chest tube; No pneumothorax; & Persistent moderate RIGHT pleural fluid collection with associated consolidation.\" SpO2 95% on room air. I discussed anticipated plan of care with patient. I discussed patient with hospital nursing staff and I reviewed IDT notes. Anticipate possible lytic treatment by pulm.    05/21/25 - IR Right posterior 10F Chest tube to Pleur-Evac with " "160 mL serosanguineous output in the last 24 hours. Chest tube clamped s/p lytic treatment by Pulm with patient laying with left side down; no leaks appreciated at time of assessment. Labs reviewed; WBC 6.5, H/H 11.4/34.4, Cr 0.77. Pleural fluid cultures (5/19) pending. CXR this morning delayed pending completion of lytic treatment. SpO2 94% on 1 Lpm Oxygen via NC. I discussed anticipated plan of care with patient. I reviewed IDT notes.    05/22/25 - Patient ambulating to restroom without difficulty. IR Right posterior 10F Chest tube to Pleur-Evac with 560 mL serosanguineous output in the last 24 hours. Chest tube to - 20 cm H2O suction; no leaks appreciated. Labs reviewed; WBC 7.2, H/H 11/34.1, Cr 0.73. Pleural fluid cultures (5/19) pending - NGTD. CXR from yesterday showed: \"Right pleural effusion appears slightly decreased with right pigtail chest tube in place. No pneumothorax.\" CXR today shows: \"Stable small right pleural effusion and associated atelectasis. No new consolidation. Right pigtail catheter tip projects over the right midlung, stable since prior. No pneumothorax.\" SpO2 90% on room air. I discussed anticipated plan of care with patient. I reviewed IDT notes.    Assessment/Plan     Principal Problem:    Atrial fibrillation with RVR (HCC)  Active Problems:    Sepsis (HCC)    Pleural effusion on right    QUE (acute kidney injury) (HCC)    High anion gap metabolic acidosis    Mass of lower lobe of left lung    Acute hypoxic respiratory failure (HCC)    Hypertension    Shock (HCC)    Plan IR  - Continue Right posterior chest tube to suction -20 cm H2O; defer to pulm during/after lytic treatments if/when applicable.  - Thoracentesis fluid cultures (5/16) pending - NGTD.  - Pleural fluid cultures (5/19) pending - NGTD.  - Serial CXR  - Hospitalist and Pulmonary following.  - lytic therapy #1 by pulm: 5/21  - lytic therapy #2 by pulm: anticipated for today  - Recommend fu CT in 5-7 days or when output " decreases to approx 100 mL out/24 hours  - Continue to monitor output, labs, and VS.  -  -Thank you for allowing Interventional Radiology team to participate in the patients care, if any additional care or requests are needed in the future please do not hesitate to call or place IR order.        Review of Systems  Physical Exam   Review of Systems   Constitutional:  Negative for chills, fever and malaise/fatigue.   Respiratory:  Negative for cough and shortness of breath.    Cardiovascular:  Negative for chest pain (minor discomfort at chest tube insertion site).   Gastrointestinal:  Negative for abdominal pain, nausea and vomiting.      Vitals:    05/22/25 0709   BP: 121/79   Pulse: 78   Resp: 18   Temp: 36.5 °C (97.7 °F)   SpO2: 90%     Physical Exam  Vitals and nursing note reviewed.   Constitutional:       General: He is not in acute distress.  Cardiovascular:      Rate and Rhythm: Normal rate.   Pulmonary:      Effort: Pulmonary effort is normal. No respiratory distress.      Comments: IR Right Posterior 10F Chest tube in place.  Abdominal:      Palpations: Abdomen is soft.      Tenderness: There is no abdominal tenderness.   Skin:     General: Skin is warm and dry.   Neurological:      General: No focal deficit present.      Mental Status: He is alert and oriented to person, place, and time.   Psychiatric:         Mood and Affect: Mood normal.         Behavior: Behavior normal.          Labs    Recent Labs     05/20/25 0240 05/21/25 0200 05/22/25 0224   WBC 6.8 6.5 7.2   RBC 3.66* 3.83* 3.70*   HEMOGLOBIN 11.0* 11.4* 11.0*   HEMATOCRIT 33.3* 34.4* 34.1*   MCV 91.0 89.8 92.2   MCH 30.1 29.8 29.7   MCHC 33.0 33.1 32.3   RDW 42.5 42.0 43.5   PLATELETCT 382 397 386   MPV 8.7* 8.6* 9.0     Recent Labs     05/20/25  0240 05/21/25  0200 05/22/25 0224   SODIUM 136 137 138   POTASSIUM 3.8 4.0 4.2   CHLORIDE 102 104 104   CO2 23 22 23   GLUCOSE 98 111* 95   BUN 14 12 12   CREATININE 0.71 0.77 0.73   CALCIUM 8.0*  8.1* 8.2*     Recent Labs     05/20/25  0240 05/21/25  0200 05/22/25  0224   ALBUMIN 2.6* 2.6* 2.8*   TBILIRUBIN 0.3 0.3 0.3   ALKPHOSPHAT 178* 180* 162*   TOTPROTEIN 5.8* 5.9* 6.0   ALTSGPT 26 49 49   ASTSGOT 24 47* 34   CREATININE 0.71 0.77 0.73     DX-CHEST-LIMITED (1 VIEW)   Final Result      1.  Stable small right pleural effusion and associated atelectasis. No new consolidation.   2.  Right pigtail catheter tip projects over the right midlung, stable since prior. No pneumothorax.      DX-CHEST-2 VIEWS   Final Result      Right pleural effusion appears slightly decreased with right pigtail chest tube in place. No pneumothorax.      DX-CHEST-2 VIEWS   Final Result      1.  Interval placement of small caliber RIGHT basilar chest tube.   2.  No pneumothorax.   3.  Persistent moderate RIGHT pleural fluid collection with associated consolidation.      CT-CHEST TUBE PLACEMENT (EMPYEMA AND PNEUMO)   Final Result      Successful CT GUIDED right  CHEST TUBE PLACEMENT.      CT-CHEST (THORAX) W/O   Final Result      1.  Loculated right pleural effusion has increased from the prior CT. Adjacent airspace opacification may represent atelectasis or pneumonia.   2.  Small left pleural effusion with adjacent compressive atelectasis.   3.  Mild mediastinal adenopathy is likely reactive.         DX-CHEST-PORTABLE (1 VIEW)   Final Result      1.  Persistent right pleural effusion and associated atelectasis/consolidation.   2.  No pneumothorax status post right-sided thoracentesis.      US-THORACENTESIS PUNCTURE RIGHT   Final Result      1. Ultrasound guided right sided diagnostic and therapeutic thoracentesis.      2. 500 mL of fluid withdrawn.      DX-CHEST-PORTABLE (1 VIEW)   Final Result         1. Moderate right pleural effusion and atelectasis unchanged.                  EC-ECHOCARDIOGRAM COMPLETE W/O CONT   Final Result      DX-CHEST-PORTABLE (1 VIEW)   Final Result      1.  Hypoinflation.   2.  Moderate right pleural  "effusion and underlying atelectasis or consolidation. As previously mentioned, possible underlying pneumonia.   3.  Minimal left pleural effusion and subsegmental atelectatic change.      DX-CHEST-LIMITED (1 VIEW)   Final Result      Airspace opacity in the right lower lobe, concerning for pneumonia.      CT-CTA COMPLETE THORACOABDOMINAL AORTA   Final Result      1.  No evidence for aortic aneurysm or dissection.   2.  Loculated right pleural effusion with associated compressive atelectasis and consolidation.   3.  Masslike area of consolidation in the left posterior medial costophrenic angle. This may be due to infection or neoplasm. Clinical correlation and follow-up is needed.   4.  Hepatomegaly with fatty infiltration of the liver.                          INR   Date Value Ref Range Status   05/14/2025 1.15 (H) 0.87 - 1.13 Final     Comment:     INR - Non-therapeutic Reference Range: 0.87-1.13  INR - Therapeutic Reference Range: 2.0-4.0       No results found for: \"POCINR\"     Intake/Output Summary (Last 24 hours) at 5/20/2025 0909  Last data filed at 5/20/2025 0645  Gross per 24 hour   Intake --   Output 590 ml   Net -590 ml      I have personally reviewed the above labs and imaging      I have performed a physical exam and reviewed and updated ROS and Plan today (5/22/2025).     32 minutes in directly providing and coordinating care and extensive data review.  No time overlap and excludes procedures.   "

## 2025-05-22 NOTE — PROGRESS NOTES
Report received from [facility] and assumed patient care at 0700. Patient is A&Ox 4, on RA, and awake and alert. Patient reporting a pain level of 0/10. Chest tube in place, dressing CDI. Call light within reach and bed in lowest position. Reinforced the need to call for assistance. Plan of care discussed and patient does not have any further needs at this time.

## 2025-05-22 NOTE — CARE PLAN
The patient is Stable - Low risk of patient condition declining or worsening    Shift Goals  Clinical Goals: Pt will remain at comfort level for pain. Monitor chest tube output.  Patient Goals: Rest  Family Goals: PERLITA    Pt is A&Ox4, on RA, and stable. Monitor chest tube output. All needs are met and questions answered at this time.    Progress made toward(s) clinical / shift goals:    Problem: Knowledge Deficit - Standard  Goal: Patient and family/care givers will demonstrate understanding of plan of care, disease process/condition, diagnostic tests and medications  Outcome: Progressing  Note: Pt verbalizes understanding of POC and status.     Problem: Pain - Standard  Goal: Alleviation of pain or a reduction in pain to the patient’s comfort goal  Outcome: Progressing  Note: Pt pain has been maintained at comfort level throughout shift.       Patient is not progressing towards the following goals:

## 2025-05-22 NOTE — PROGRESS NOTES
[facility] Internal Medicine Daily Progress Note    Date of Service  5/22/2025    Attending: Nena Walden MD  Senior Resident: Dr. David Mendoza MD  Contact Number: [phone]    Chief Complaint  Shortness of breath    Hospital Course  This is 60 years old male with past medical history of hypertension, hyperlipidemia who presented to ED with severe right-sided chest pain, fever, chills and diaphoresis.  Patient denied any smoking history, sick contact, recent travel, history of similar condition.     In the ED, patient was found to have A-fib with RVR in the 140s and mildly hypoxic.  He is CTA chest revealed loculated right pleural effusion with adjacent atelectasis and patient was admitted to ICU.     Pulmonary was consulted for declined pleural effusion and initial thoracocentesis was not successful but subsequent was able to get 500 cc on 5/16/2025 with a glucose of 12, total protein 3.9, LDH 1803.     Patient had chest tube inserted on 5/19/2025 and drained 590 so far.     On 5/20/2025 total chest tube output is 160     On 5/21/2024: Intrapleural fibrinolytics (Alteplase 10mg, Dornase Alpha 5mg).    Interval Problem Update  No acute overnight event  Patient reports feeling much better today, denied any shortness of breath or chest pain.  After fibrinolytic alteplase and DNase patient chest tube output 1985 mL    Vital stable, blood work unremarkable  Chest x-ray showed small right sight pleural effusion better than yesterday    Code Status  Full Code      Review of Systems  Review of Systems   Constitutional:  Positive for malaise/fatigue. Negative for chills, fever and weight loss.   Respiratory:  Negative for cough, hemoptysis, sputum production and shortness of breath.    Cardiovascular:  Negative for chest pain, palpitations, orthopnea and claudication.   Gastrointestinal:  Negative for heartburn, nausea and vomiting.   Genitourinary:  Negative for dysuria and urgency.   Musculoskeletal:  Negative for  myalgias.   Neurological:  Negative for dizziness.   Psychiatric/Behavioral:  Negative for depression.         Physical Exam  Temp:  [36.5 °C (97.7 °F)-37.7 °C (99.9 °F)] 36.5 °C (97.7 °F)  Pulse:  [78-93] 78  Resp:  [15-18] 18  BP: (104-125)/(68-79) 121/79  SpO2:  [90 %-94 %] 90 %    Physical Exam  Constitutional:       Appearance: Normal appearance.   HENT:      Head: Normocephalic and atraumatic.   Cardiovascular:      Rate and Rhythm: Normal rate and regular rhythm.   Pulmonary:      Effort: Pulmonary effort is normal.      Breath sounds: Normal breath sounds.      Comments: Chest tube in place  Abdominal:      General: Abdomen is flat. Bowel sounds are normal.      Palpations: Abdomen is soft.   Musculoskeletal:         General: Normal range of motion.   Skin:     General: Skin is warm.      Capillary Refill: Capillary refill takes less than 2 seconds.   Neurological:      General: No focal deficit present.      Mental Status: He is alert and oriented to person, place, and time.         Fluids    Intake/Output Summary (Last 24 hours) at 5/22/2025 0843  Last data filed at 5/22/2025 0615  Gross per 24 hour   Intake 790 ml   Output 1985 ml   Net -1195 ml       Laboratory  Recent Labs     05/20/25 0240 05/21/25 0200 05/22/25 0224   WBC 6.8 6.5 7.2   RBC 3.66* 3.83* 3.70*   HEMOGLOBIN 11.0* 11.4* 11.0*   HEMATOCRIT 33.3* 34.4* 34.1*   MCV 91.0 89.8 92.2   MCH 30.1 29.8 29.7   MCHC 33.0 33.1 32.3   RDW 42.5 42.0 43.5   PLATELETCT 382 397 386   MPV 8.7* 8.6* 9.0     Recent Labs     05/20/25 0240 05/21/25  0200 05/22/25 0224   SODIUM 136 137 138   POTASSIUM 3.8 4.0 4.2   CHLORIDE 102 104 104   CO2 23 22 23   GLUCOSE 98 111* 95   BUN 14 12 12   CREATININE 0.71 0.77 0.73   CALCIUM 8.0* 8.1* 8.2*                   Imaging  DX-CHEST-2 VIEWS   Final Result      Right pleural effusion appears slightly decreased with right pigtail chest tube in place. No pneumothorax.      DX-CHEST-2 VIEWS   Final Result      1.   Interval placement of small caliber RIGHT basilar chest tube.   2.  No pneumothorax.   3.  Persistent moderate RIGHT pleural fluid collection with associated consolidation.      CT-CHEST TUBE PLACEMENT (EMPYEMA AND PNEUMO)   Final Result      Successful CT GUIDED right  CHEST TUBE PLACEMENT.      CT-CHEST (THORAX) W/O   Final Result      1.  Loculated right pleural effusion has increased from the prior CT. Adjacent airspace opacification may represent atelectasis or pneumonia.   2.  Small left pleural effusion with adjacent compressive atelectasis.   3.  Mild mediastinal adenopathy is likely reactive.         DX-CHEST-PORTABLE (1 VIEW)   Final Result      1.  Persistent right pleural effusion and associated atelectasis/consolidation.   2.  No pneumothorax status post right-sided thoracentesis.      US-THORACENTESIS PUNCTURE RIGHT   Final Result      1. Ultrasound guided right sided diagnostic and therapeutic thoracentesis.      2. 500 mL of fluid withdrawn.      DX-CHEST-PORTABLE (1 VIEW)   Final Result         1. Moderate right pleural effusion and atelectasis unchanged.                  EC-ECHOCARDIOGRAM COMPLETE W/O CONT   Final Result      DX-CHEST-PORTABLE (1 VIEW)   Final Result      1.  Hypoinflation.   2.  Moderate right pleural effusion and underlying atelectasis or consolidation. As previously mentioned, possible underlying pneumonia.   3.  Minimal left pleural effusion and subsegmental atelectatic change.      DX-CHEST-LIMITED (1 VIEW)   Final Result      Airspace opacity in the right lower lobe, concerning for pneumonia.      CT-CTA COMPLETE THORACOABDOMINAL AORTA   Final Result      1.  No evidence for aortic aneurysm or dissection.   2.  Loculated right pleural effusion with associated compressive atelectasis and consolidation.   3.  Masslike area of consolidation in the left posterior medial costophrenic angle. This may be due to infection or neoplasm. Clinical correlation and follow-up is needed.    4.  Hepatomegaly with fatty infiltration of the liver.                        DX-CHEST-LIMITED (1 VIEW)    (Results Pending)        Assessment/Plan  Problem Representation:    Pleural effusion on right- (present on admission)  Assessment & Plan  Small right-sided pleural effusion associated with atelectasis unknown the cause, patient denied any benefit symptom, smoking history.  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  Initial thoracocentesis was unsuccessful but subsequently was able to get 500 cc with low glucose level  Fluid removed 5/16/2025 (Glucose 12, TP 3.9, LDH 1803_ == infected and exudative  Intrapleural fibrinolytics with alteplase 10mg and Dnase 5 mg started on 5/21/2025  Chest tube draining 1985 during last 24 hours  Continue chest tube to suction  Pulmonary hygiene with OOB and early mobility  Continue with antibiotic Unasyn  Chest x-ray the morning  Follow-up with cultures  Will continue follow-up

## 2025-05-22 NOTE — PROGRESS NOTES
Hospital Medicine Daily Progress Note    Date of Service  5/22/2025    Chief Complaint    Hospital Course  Marlo Gusman is a 60 y.o. male who presented 5/14/2025 with shortness of breath and right sided flank pain x 5 days.  He has a hx of HTN, DLD.  He states he feels his flank pain was from a recent upper respiratory infection he was battling for past 2 weeks with ongoing fever, chills, and coughing.      A CT chest showed a loculated right pleural effusion.  Initially he required pressor support. He was in atrial fib with RVR and hypoxic. An initial thoracentesis was unsuccessfulHe had a thoracentesis with 500cc removed with improvement of his breathing.    Interval Problem Update  No acute events overnight.  Patient feels well, on room air.  CXR reviewed showing improved R pleural effusion, having good output from chest tube. Monitor output.  Continuing intrapleural lytic therapy today. Appreciate pulmonology management.  Continue antibiotics for pneumonia.      I have discussed this patient's plan of care and discharge plan at IDT rounds today with Case Management, Nursing, Nursing leadership, and other members of the IDT team.    Consultants/Specialty  Pulmonology, interventional radiology, critical care      Code Status  Full Code    Disposition  Medically Cleared  I have placed the appropriate orders for post-discharge needs.    Review of Systems  Review of Systems   Constitutional: Negative.  Negative for chills and fever.   Cardiovascular:  Positive for chest pain.   Gastrointestinal:  Negative for nausea and vomiting.   Neurological:  Negative for dizziness.        Physical Exam  Temp:  [36.5 °C (97.7 °F)-37.7 °C (99.9 °F)] 36.5 °C (97.7 °F)  Pulse:  [78-93] 78  Resp:  [15-18] 18  BP: (104-125)/(68-79) 121/79  SpO2:  [90 %-94 %] 90 %    Physical Exam  Vitals and nursing note reviewed.   Constitutional:       Appearance: Normal appearance.   HENT:      Head: Normocephalic and atraumatic.   Eyes:       Extraocular Movements: Extraocular movements intact.   Cardiovascular:      Rate and Rhythm: Normal rate.      Heart sounds: Normal heart sounds. No murmur heard.  Pulmonary:      Effort: Pulmonary effort is normal. No respiratory distress.      Breath sounds: Normal breath sounds. No wheezing.   Abdominal:      General: Abdomen is flat. Bowel sounds are normal. There is no distension.   Musculoskeletal:         General: Normal range of motion.   Skin:     General: Skin is warm and dry.   Neurological:      General: No focal deficit present.      Mental Status: He is alert. Mental status is at baseline.   Psychiatric:         Mood and Affect: Mood normal.         Thought Content: Thought content normal.         Fluids    Intake/Output Summary (Last 24 hours) at 5/22/2025 1530  Last data filed at 5/22/2025 0918  Gross per 24 hour   Intake 500 ml   Output 1460 ml   Net -960 ml        Laboratory  Recent Labs     05/20/25  0240 05/21/25  0200 05/22/25  0224   WBC 6.8 6.5 7.2   RBC 3.66* 3.83* 3.70*   HEMOGLOBIN 11.0* 11.4* 11.0*   HEMATOCRIT 33.3* 34.4* 34.1*   MCV 91.0 89.8 92.2   MCH 30.1 29.8 29.7   MCHC 33.0 33.1 32.3   RDW 42.5 42.0 43.5   PLATELETCT 382 397 386   MPV 8.7* 8.6* 9.0     Recent Labs     05/20/25  0240 05/21/25  0200 05/22/25  0224   SODIUM 136 137 138   POTASSIUM 3.8 4.0 4.2   CHLORIDE 102 104 104   CO2 23 22 23   GLUCOSE 98 111* 95   BUN 14 12 12   CREATININE 0.71 0.77 0.73   CALCIUM 8.0* 8.1* 8.2*                   Imaging  CT guided chest tube 5/19/2025    Assessment/Plan  * Atrial fibrillation with RVR (HCC)- (present on admission)  Assessment & Plan  Resolve of RVR  Continue metoprolol    Hypertension  Assessment & Plan  Metoprolol.  Hold outpatientHCTZrestart losartan and amlodipine as BP dictate/allows  Vitals:    05/17/25 1232   BP: 126/85   Pulse: (!) 108   Resp: 20   Temp: 36.8 °C (98.2 °F)   SpO2: 92%     Stable.    Acute hypoxic respiratory failure (HCC)- (present on  admission)  Assessment & Plan  Concern of recent URI and probable post obstructive pneumonia.  Thoracentesis with 500cc removed  Follow up on pleural effusion evaluation  Titrate oxygen  Mobilize  Antitussives as need    Mass of lower lobe of left lung- (present on admission)  Assessment & Plan  Mass vs consolidation  Follow up on pleural effusion cytology  May need future follow up CT and or biopsy.  Treating as infection at this time.    Pulmonology following    High anion gap metabolic acidosis- (present on admission)  Assessment & Plan  resolve    QUE (acute kidney injury) (HCC)- (present on admission)  Assessment & Plan  Suspected prerenal and hypovolemia  Improved with IV fluids and resolve of Afib rvr          Pleural effusion on right- (present on admission)  Assessment & Plan  Had a thoracentesis.  F/U post thoracentesis xray still with opacification of right lower lung  Pulmonology following, continue intrapleural lytic therapy today day 2  Effusion improving    Sepsis (HCC)- (present on admission)  Assessment & Plan  S/p pressors and has had IV fluid resuscitation  Monitor I/O's, labs, and vitals.    Follow pleural fluid cultures         VTE prophylaxis: VTE Selection    I have performed a physical exam and reviewed and updated ROS and Plan today (5/22/2025). In review of yesterday's note (5/21/2025), there are no changes except as documented above.

## 2025-05-23 ENCOUNTER — APPOINTMENT (OUTPATIENT)
Dept: RADIOLOGY | Facility: MEDICAL CENTER | Age: 60
DRG: 871 | End: 2025-05-23
Attending: STUDENT IN AN ORGANIZED HEALTH CARE EDUCATION/TRAINING PROGRAM
Payer: COMMERCIAL

## 2025-05-23 LAB
ALBUMIN SERPL BCP-MCNC: 2.8 G/DL (ref 3.2–4.9)
ALBUMIN/GLOB SERPL: 0.8 G/DL
ALP SERPL-CCNC: 156 U/L (ref 30–99)
ALT SERPL-CCNC: 60 U/L (ref 2–50)
ANION GAP SERPL CALC-SCNC: 11 MMOL/L (ref 7–16)
AST SERPL-CCNC: 38 U/L (ref 12–45)
BACTERIA BLD CULT: NORMAL
BASOPHILS # BLD AUTO: 0.4 % (ref 0–1.8)
BASOPHILS # BLD: 0.03 K/UL (ref 0–0.12)
BILIRUB SERPL-MCNC: 0.3 MG/DL (ref 0.1–1.5)
BUN SERPL-MCNC: 10 MG/DL (ref 8–22)
CALCIUM ALBUM COR SERPL-MCNC: 9.1 MG/DL (ref 8.5–10.5)
CALCIUM SERPL-MCNC: 8.1 MG/DL (ref 8.5–10.5)
CHLORIDE SERPL-SCNC: 104 MMOL/L (ref 96–112)
CO2 SERPL-SCNC: 21 MMOL/L (ref 20–33)
CREAT SERPL-MCNC: 0.7 MG/DL (ref 0.5–1.4)
EOSINOPHIL # BLD AUTO: 0.16 K/UL (ref 0–0.51)
EOSINOPHIL NFR BLD: 2 % (ref 0–6.9)
ERYTHROCYTE [DISTWIDTH] IN BLOOD BY AUTOMATED COUNT: 41.1 FL (ref 35.9–50)
GFR SERPLBLD CREATININE-BSD FMLA CKD-EPI: 105 ML/MIN/1.73 M 2
GLOBULIN SER CALC-MCNC: 3.4 G/DL (ref 1.9–3.5)
GLUCOSE SERPL-MCNC: 106 MG/DL (ref 65–99)
HCT VFR BLD AUTO: 34.3 % (ref 42–52)
HGB BLD-MCNC: 11.3 G/DL (ref 14–18)
IMM GRANULOCYTES # BLD AUTO: 0.04 K/UL (ref 0–0.11)
IMM GRANULOCYTES NFR BLD AUTO: 0.5 % (ref 0–0.9)
LYMPHOCYTES # BLD AUTO: 0.99 K/UL (ref 1–4.8)
LYMPHOCYTES NFR BLD: 12.5 % (ref 22–41)
MAGNESIUM SERPL-MCNC: 2 MG/DL (ref 1.5–2.5)
MCH RBC QN AUTO: 29.5 PG (ref 27–33)
MCHC RBC AUTO-ENTMCNC: 32.9 G/DL (ref 32.3–36.5)
MCV RBC AUTO: 89.6 FL (ref 81.4–97.8)
MONOCYTES # BLD AUTO: 0.65 K/UL (ref 0–0.85)
MONOCYTES NFR BLD AUTO: 8.2 % (ref 0–13.4)
NEUTROPHILS # BLD AUTO: 6.08 K/UL (ref 1.82–7.42)
NEUTROPHILS NFR BLD: 76.4 % (ref 44–72)
NRBC # BLD AUTO: 0 K/UL
NRBC BLD-RTO: 0 /100 WBC (ref 0–0.2)
PLATELET # BLD AUTO: 380 K/UL (ref 164–446)
PMV BLD AUTO: 8.6 FL (ref 9–12.9)
POTASSIUM SERPL-SCNC: 4.2 MMOL/L (ref 3.6–5.5)
PROT SERPL-MCNC: 6.2 G/DL (ref 6–8.2)
RBC # BLD AUTO: 3.83 M/UL (ref 4.7–6.1)
SIGNIFICANT IND 70042: NORMAL
SITE SITE: NORMAL
SODIUM SERPL-SCNC: 136 MMOL/L (ref 135–145)
SOURCE SOURCE: NORMAL
WBC # BLD AUTO: 8 K/UL (ref 4.8–10.8)

## 2025-05-23 PROCEDURE — 770006 HCHG ROOM/CARE - MED/SURG/GYN SEMI*

## 2025-05-23 PROCEDURE — 700102 HCHG RX REV CODE 250 W/ 637 OVERRIDE(OP): Performed by: NURSE PRACTITIONER

## 2025-05-23 PROCEDURE — A9270 NON-COVERED ITEM OR SERVICE: HCPCS

## 2025-05-23 PROCEDURE — A9270 NON-COVERED ITEM OR SERVICE: HCPCS | Performed by: NURSE PRACTITIONER

## 2025-05-23 PROCEDURE — 83735 ASSAY OF MAGNESIUM: CPT

## 2025-05-23 PROCEDURE — 700102 HCHG RX REV CODE 250 W/ 637 OVERRIDE(OP): Performed by: INTERNAL MEDICINE

## 2025-05-23 PROCEDURE — 85025 COMPLETE CBC W/AUTO DIFF WBC: CPT

## 2025-05-23 PROCEDURE — 700111 HCHG RX REV CODE 636 W/ 250 OVERRIDE (IP): Mod: JZ | Performed by: FAMILY MEDICINE

## 2025-05-23 PROCEDURE — 99232 SBSQ HOSP IP/OBS MODERATE 35: CPT | Mod: 25,GC | Performed by: STUDENT IN AN ORGANIZED HEALTH CARE EDUCATION/TRAINING PROGRAM

## 2025-05-23 PROCEDURE — 71045 X-RAY EXAM CHEST 1 VIEW: CPT

## 2025-05-23 PROCEDURE — A9270 NON-COVERED ITEM OR SERVICE: HCPCS | Performed by: STUDENT IN AN ORGANIZED HEALTH CARE EDUCATION/TRAINING PROGRAM

## 2025-05-23 PROCEDURE — 700105 HCHG RX REV CODE 258: Performed by: FAMILY MEDICINE

## 2025-05-23 PROCEDURE — 3E0L317 INTRODUCTION OF OTHER THROMBOLYTIC INTO PLEURAL CAVITY, PERCUTANEOUS APPROACH: ICD-10-PCS | Performed by: STUDENT IN AN ORGANIZED HEALTH CARE EDUCATION/TRAINING PROGRAM

## 2025-05-23 PROCEDURE — 700102 HCHG RX REV CODE 250 W/ 637 OVERRIDE(OP)

## 2025-05-23 PROCEDURE — 36415 COLL VENOUS BLD VENIPUNCTURE: CPT

## 2025-05-23 PROCEDURE — 700102 HCHG RX REV CODE 250 W/ 637 OVERRIDE(OP): Performed by: STUDENT IN AN ORGANIZED HEALTH CARE EDUCATION/TRAINING PROGRAM

## 2025-05-23 PROCEDURE — A9270 NON-COVERED ITEM OR SERVICE: HCPCS | Performed by: INTERNAL MEDICINE

## 2025-05-23 PROCEDURE — 80053 COMPREHEN METABOLIC PANEL: CPT

## 2025-05-23 PROCEDURE — 32562 LYSE CHEST FIBRIN SUBQ DAY: CPT | Performed by: STUDENT IN AN ORGANIZED HEALTH CARE EDUCATION/TRAINING PROGRAM

## 2025-05-23 PROCEDURE — 99232 SBSQ HOSP IP/OBS MODERATE 35: CPT | Performed by: STUDENT IN AN ORGANIZED HEALTH CARE EDUCATION/TRAINING PROGRAM

## 2025-05-23 RX ORDER — ACETAMINOPHEN 500 MG
1000 TABLET ORAL EVERY 6 HOURS PRN
Status: DISCONTINUED | OUTPATIENT
Start: 2025-05-23 | End: 2025-05-26

## 2025-05-23 RX ADMIN — ACETAMINOPHEN 1000 MG: 500 TABLET ORAL at 15:06

## 2025-05-23 RX ADMIN — METOPROLOL TARTRATE 25 MG: 25 TABLET, FILM COATED ORAL at 17:21

## 2025-05-23 RX ADMIN — GUAIFENESIN SYRUP AND DEXTROMETHORPHAN 5 ML: 100; 10 SYRUP ORAL at 23:17

## 2025-05-23 RX ADMIN — AMPICILLIN AND SULBACTAM 3 G: 1; 2 INJECTION, POWDER, FOR SOLUTION INTRAMUSCULAR; INTRAVENOUS at 23:18

## 2025-05-23 RX ADMIN — METOPROLOL TARTRATE 25 MG: 25 TABLET, FILM COATED ORAL at 04:45

## 2025-05-23 RX ADMIN — OXYCODONE 5 MG: 5 TABLET ORAL at 23:17

## 2025-05-23 RX ADMIN — AMPICILLIN AND SULBACTAM 3 G: 1; 2 INJECTION, POWDER, FOR SOLUTION INTRAMUSCULAR; INTRAVENOUS at 17:23

## 2025-05-23 RX ADMIN — AMPICILLIN AND SULBACTAM 3 G: 1; 2 INJECTION, POWDER, FOR SOLUTION INTRAMUSCULAR; INTRAVENOUS at 04:49

## 2025-05-23 RX ADMIN — GUAIFENESIN SYRUP AND DEXTROMETHORPHAN 5 ML: 100; 10 SYRUP ORAL at 17:21

## 2025-05-23 RX ADMIN — AMPICILLIN AND SULBACTAM 3 G: 1; 2 INJECTION, POWDER, FOR SOLUTION INTRAMUSCULAR; INTRAVENOUS at 12:53

## 2025-05-23 RX ADMIN — GUAIFENESIN SYRUP AND DEXTROMETHORPHAN 5 ML: 100; 10 SYRUP ORAL at 12:54

## 2025-05-23 RX ADMIN — GUAIFENESIN SYRUP AND DEXTROMETHORPHAN 5 ML: 100; 10 SYRUP ORAL at 04:45

## 2025-05-23 RX ADMIN — ACETAMINOPHEN 1000 MG: 500 TABLET ORAL at 04:51

## 2025-05-23 ASSESSMENT — COGNITIVE AND FUNCTIONAL STATUS - GENERAL
MOBILITY SCORE: 23
SUGGESTED CMS G CODE MODIFIER DAILY ACTIVITY: CJ
DRESSING REGULAR UPPER BODY CLOTHING: A LITTLE
CLIMB 3 TO 5 STEPS WITH RAILING: A LITTLE
SUGGESTED CMS G CODE MODIFIER MOBILITY: CI
HELP NEEDED FOR BATHING: A LITTLE
DAILY ACTIVITIY SCORE: 22

## 2025-05-23 ASSESSMENT — ENCOUNTER SYMPTOMS
PALPITATIONS: 0
CHILLS: 0
SHORTNESS OF BREATH: 0
WEIGHT LOSS: 0
VOMITING: 0
NAUSEA: 0
ABDOMINAL PAIN: 0
DIZZINESS: 0
HEMOPTYSIS: 0
HEARTBURN: 0
ORTHOPNEA: 0
FEVER: 0
CLAUDICATION: 0
MYALGIAS: 0
SPUTUM PRODUCTION: 0
DEPRESSION: 0
COUGH: 0
CONSTITUTIONAL NEGATIVE: 1

## 2025-05-23 ASSESSMENT — PAIN DESCRIPTION - PAIN TYPE
TYPE: ACUTE PAIN

## 2025-05-23 NOTE — CARE PLAN
The patient is Stable - Low risk of patient condition declining or worsening    Shift Goals  Clinical Goals: Patient will remain hemodynamically stable, have adequate oxygenation with chest tube in place  Patient Goals: Rest  Family Goals: PERLITA    Progress made toward(s) clinical / shift goals:  Patient on Q4H VS per policy of chest tube. Patient has remained hemodynamically stable. Patient stating well on RA, no reports of SOB or dyspnea. Patient O2 saturation remained in the 90% this shift. Chest tube remained intact, no evidence of dislodgement or subcutaneous emphysema. Tidaling present throughout shift.       Problem: Knowledge Deficit - Standard  Goal: Patient and family/care givers will demonstrate understanding of plan of care, disease process/condition, diagnostic tests and medications  Outcome: Progressing     Problem: Hemodynamics  Goal: Patient's hemodynamics, fluid balance and neurologic status will be stable or improve  Description: Target End Date:  Prior to discharge or change in level of careDocument on Assessment and I/O flowsheet templates1.  Monitor vital signs, pulse oximetry and cardiac monitor per provider order and/or policy2.  Maintain blood pressure per provider order3.  Hemodynamic monitoring per provider order4.  Manage IV fluids and IV infusions5.  Monitor intake and output6.  Daily weights per unit policy or provider order7.  Assess peripheral pulses and capillary refill8.  Assess color and body temperature9.  Position patient for maximum circulation/cardiac [id number]. Monitor for signs/symptoms of excessive [id number]. Assess mental status, restlessness and changes in level of [id number]. Monitor temperature and report fever or hypothermia to provider immediately. Consideration of targeted temperature management.  Outcome: Progressing     Problem: Respiratory  Goal: Patient will achieve/maintain optimum respiratory ventilation and gas exchange  Description: Target End Date:  Prior  to discharge or change in level of careDocument on Assessment flowsheet1.  Assess and monitor rate, rhythm, depth and effort of respiration2.  Breath sounds assessed qshift and/or as needed3.  Assess O2 saturation, administer/titrate oxygen as ordered4.  Position patient for maximum ventilatory efficiency5.  Turn, cough, and deep breath with splinting to improve effectiveness6.  Collaborate with RT to administer medication/treatments per order7.  Encourage use of incentive spirometer and encourage patient to cough after use and utilize splinting techniques if applicable8.  Airway suctioning9.  Monitor sputum production for changes in color, consistency and [id number]. Perform frequent oral [id number]. Alternate physical activity with rest periods  Outcome: Progressing       Patient is not progressing towards the following goals:

## 2025-05-23 NOTE — PROGRESS NOTES
Hospital Medicine Daily Progress Note    Date of Service  5/23/2025    Chief Complaint    Hospital Course  Marlo Gusman is a 60 y.o. male who presented 5/14/2025 with shortness of breath and right sided flank pain x 5 days.  He has a hx of HTN, DLD.  He states he feels his flank pain was from a recent upper respiratory infection he was battling for past 2 weeks with ongoing fever, chills, and coughing.      A CT chest showed a loculated right pleural effusion.  Initially he required pressor support. He was in atrial fib with RVR and hypoxic. An initial thoracentesis was unsuccessfulHe had a thoracentesis with 500cc removed with improvement of his breathing.    Interval Problem Update  No acute events overnight.  Patient feels well, denies any dyspnea.  CXR reviewed showing improved R lung effusion.  Day 3 of intralpleural lytic therapy today per pulmonology.  Follow up CXR.  Appreciate chest tube management per pulm and IR.  Continue antibiotics for pneumonia.        I have discussed this patient's plan of care and discharge plan at IDT rounds today with Case Management, Nursing, Nursing leadership, and other members of the IDT team.    Consultants/Specialty  Pulmonology, interventional radiology, critical care      Code Status  Full Code    Disposition  Medically Cleared  I have placed the appropriate orders for post-discharge needs.    Review of Systems  Review of Systems   Constitutional: Negative.  Negative for chills and fever.   Cardiovascular:  Positive for chest pain.   Gastrointestinal:  Negative for nausea and vomiting.   Neurological:  Negative for dizziness.        Physical Exam  Temp:  [36.4 °C (97.6 °F)-38.2 °C (100.8 °F)] 36.7 °C (98.1 °F)  Pulse:  [74-92] 80  Resp:  [17-18] 18  BP: (108-128)/(72-88) 111/73  SpO2:  [91 %-93 %] 93 %    Physical Exam  Vitals and nursing note reviewed.   Constitutional:       Appearance: Normal appearance.   HENT:      Head: Normocephalic and atraumatic.   Eyes:       Extraocular Movements: Extraocular movements intact.   Cardiovascular:      Rate and Rhythm: Normal rate.      Heart sounds: Normal heart sounds. No murmur heard.  Pulmonary:      Effort: Pulmonary effort is normal. No respiratory distress.      Breath sounds: Normal breath sounds. No wheezing.   Abdominal:      General: Abdomen is flat. Bowel sounds are normal. There is no distension.   Musculoskeletal:         General: Normal range of motion.   Skin:     General: Skin is warm and dry.   Neurological:      General: No focal deficit present.      Mental Status: He is alert. Mental status is at baseline.   Psychiatric:         Mood and Affect: Mood normal.         Thought Content: Thought content normal.         Fluids    Intake/Output Summary (Last 24 hours) at 5/23/2025 1558  Last data filed at 5/23/2025 1412  Gross per 24 hour   Intake 840 ml   Output 1465 ml   Net -625 ml        Laboratory  Recent Labs     05/21/25  0200 05/22/25  0224 05/23/25  0254   WBC 6.5 7.2 8.0   RBC 3.83* 3.70* 3.83*   HEMOGLOBIN 11.4* 11.0* 11.3*   HEMATOCRIT 34.4* 34.1* 34.3*   MCV 89.8 92.2 89.6   MCH 29.8 29.7 29.5   MCHC 33.1 32.3 32.9   RDW 42.0 43.5 41.1   PLATELETCT 397 386 380   MPV 8.6* 9.0 8.6*     Recent Labs     05/21/25  0200 05/22/25  0224 05/23/25  0254   SODIUM 137 138 136   POTASSIUM 4.0 4.2 4.2   CHLORIDE 104 104 104   CO2 22 23 21   GLUCOSE 111* 95 106*   BUN 12 12 10   CREATININE 0.77 0.73 0.70   CALCIUM 8.1* 8.2* 8.1*                   Imaging  CT guided chest tube 5/19/2025    Assessment/Plan  * Atrial fibrillation with RVR (HCC)- (present on admission)  Assessment & Plan  Resolve of RVR  Continue metoprolol    Hypertension  Assessment & Plan  Metoprolol.  Hold outpatientHCTZrestart losartan and amlodipine as BP dictate/allows  Vitals:    05/17/25 1232   BP: 126/85   Pulse: (!) 108   Resp: 20   Temp: 36.8 °C (98.2 °F)   SpO2: 92%     Stable.    Acute hypoxic respiratory failure (HCC)- (present on  admission)  Assessment & Plan  Concern of recent URI and probable post obstructive pneumonia.  Thoracentesis with 500cc removed  Follow up on pleural effusion evaluation  Titrate oxygen  Mobilize  Antitussives as need    Mass of lower lobe of left lung- (present on admission)  Assessment & Plan  Mass vs consolidation  Follow up on pleural effusion cytology  May need future follow up CT and or biopsy.  Treating as infection at this time.    Pulmonology following    High anion gap metabolic acidosis- (present on admission)  Assessment & Plan  resolve    QUE (acute kidney injury) (HCC)- (present on admission)  Assessment & Plan  Suspected prerenal and hypovolemia  Improved with IV fluids and resolve of Afib rvr          Pleural effusion on right- (present on admission)  Assessment & Plan  Had a thoracentesis.  F/U post thoracentesis xray still with opacification of right lower lung  Pulmonology following, continue intrapleural lytic therapy today day 2  Effusion improving    Sepsis (HCC)- (present on admission)  Assessment & Plan  S/p pressors and has had IV fluid resuscitation  Monitor I/O's, labs, and vitals.    Follow pleural fluid cultures         VTE prophylaxis: VTE Selection    I have performed a physical exam and reviewed and updated ROS and Plan today (5/23/2025). In review of yesterday's note (5/22/2025), there are no changes except as documented above.

## 2025-05-23 NOTE — PROGRESS NOTES
[facility] Internal Medicine Daily Progress Note    Date of Service  5/23/2025    Attending: Nena Walden MD  Senior Resident: David Mendoza MD  Contact Number: [phone]    Chief Complaint  Shortness of breath    Hospital Course  This is 60 years old male with past medical history of hypertension, hyperlipidemia who presented to ED with severe right-sided chest pain, fever, chills and diaphoresis.  Patient denied any smoking history, sick contact, recent travel, history of similar condition.     In the ED, patient was found to have A-fib with RVR in the 140s and mildly hypoxic.  He is CTA chest revealed loculated right pleural effusion with adjacent atelectasis and patient was admitted to ICU.     Pulmonary was consulted for declined pleural effusion and initial thoracocentesis was not successful but subsequent was able to get 500 cc on 5/16/2025 with a glucose of 12, total protein 3.9, LDH 1803.     Patient had chest tube inserted on 5/19/2025 and drained 590 so far.     On 5/20/2025 total chest tube output is 160      Day #1, 5/21/2025: Intrapleural fibrinolytics (Alteplase 10mg, Dornase Alpha 5mg)     Day #2, 5/22/2025: Intrapleural fibrinolytics  About another 600 cc output after tpa/dornase yesterday, pleural effusion looks improved on chest x-ray.      Interval Problem Update  No major event last night  Patient report feeling okay, no any symptom or complaint  tPA/DNase day 2 around 940.  Vitals: Patient with small incidence of Tmax 100.8 Fahrenheit  Blood work showed hemoglobin 11.3, white blood cell 8000, pleural fluid culture negative  Imaging showed:  1.  Hazy right pulmonary infiltrates, stable  2.  Trace right pleural effusion, stable    Code Status  Full Code        Review of Systems  Review of Systems   Constitutional:  Positive for malaise/fatigue. Negative for chills, fever and weight loss.   Respiratory:  Negative for cough, hemoptysis and sputum production.    Cardiovascular:  Negative for chest  pain, palpitations, orthopnea and claudication.   Gastrointestinal:  Negative for heartburn, nausea and vomiting.   Genitourinary:  Negative for dysuria, frequency and urgency.   Musculoskeletal:  Negative for myalgias.   Neurological:  Negative for dizziness.   Psychiatric/Behavioral:  Negative for depression.         Physical Exam  Temp:  [36.4 °C (97.6 °F)-38.2 °C (100.8 °F)] 36.4 °C (97.6 °F)  Pulse:  [66-92] 74  Resp:  [17-18] 17  BP: ()/(57-88) 108/72  SpO2:  [91 %-93 %] 92 %    Physical Exam  Constitutional:       Appearance: Normal appearance.   HENT:      Head: Normocephalic and atraumatic.   Cardiovascular:      Rate and Rhythm: Normal rate and regular rhythm.   Pulmonary:      Effort: Pulmonary effort is normal.      Breath sounds: Normal breath sounds.   Abdominal:      General: Abdomen is flat. Bowel sounds are normal.      Palpations: Abdomen is soft.   Musculoskeletal:         General: Normal range of motion.   Skin:     General: Skin is warm.      Capillary Refill: Capillary refill takes less than 2 seconds.   Neurological:      General: No focal deficit present.      Mental Status: He is alert and oriented to person, place, and time.   Psychiatric:         Mood and Affect: Mood normal.         Fluids    Intake/Output Summary (Last 24 hours) at 5/23/2025 0820  Last data filed at 5/23/2025 0818  Gross per 24 hour   Intake 540 ml   Output 1465 ml   Net -925 ml       Laboratory  Recent Labs     05/21/25  0200 05/22/25 0224 05/23/25  0254   WBC 6.5 7.2 8.0   RBC 3.83* 3.70* 3.83*   HEMOGLOBIN 11.4* 11.0* 11.3*   HEMATOCRIT 34.4* 34.1* 34.3*   MCV 89.8 92.2 89.6   MCH 29.8 29.7 29.5   MCHC 33.1 32.3 32.9   RDW 42.0 43.5 41.1   PLATELETCT 397 386 380   MPV 8.6* 9.0 8.6*     Recent Labs     05/21/25  0200 05/22/25  0224 05/23/25  0254   SODIUM 137 138 136   POTASSIUM 4.0 4.2 4.2   CHLORIDE 104 104 104   CO2 22 23 21   GLUCOSE 111* 95 106*   BUN 12 12 10   CREATININE 0.77 0.73 0.70   CALCIUM 8.1* 8.2*  8.1*                   Imaging  DX-CHEST-LIMITED (1 VIEW)   Final Result         1.  Hazy right pulmonary infiltrates, stable   2.  Trace right pleural effusion, stable      DX-CHEST-LIMITED (1 VIEW)   Final Result      1.  Stable small right pleural effusion and associated atelectasis. No new consolidation.   2.  Right pigtail catheter tip projects over the right midlung, stable since prior. No pneumothorax.      DX-CHEST-2 VIEWS   Final Result      Right pleural effusion appears slightly decreased with right pigtail chest tube in place. No pneumothorax.      DX-CHEST-2 VIEWS   Final Result      1.  Interval placement of small caliber RIGHT basilar chest tube.   2.  No pneumothorax.   3.  Persistent moderate RIGHT pleural fluid collection with associated consolidation.      CT-CHEST TUBE PLACEMENT (EMPYEMA AND PNEUMO)   Final Result      Successful CT GUIDED right  CHEST TUBE PLACEMENT.      CT-CHEST (THORAX) W/O   Final Result      1.  Loculated right pleural effusion has increased from the prior CT. Adjacent airspace opacification may represent atelectasis or pneumonia.   2.  Small left pleural effusion with adjacent compressive atelectasis.   3.  Mild mediastinal adenopathy is likely reactive.         DX-CHEST-PORTABLE (1 VIEW)   Final Result      1.  Persistent right pleural effusion and associated atelectasis/consolidation.   2.  No pneumothorax status post right-sided thoracentesis.      US-THORACENTESIS PUNCTURE RIGHT   Final Result      1. Ultrasound guided right sided diagnostic and therapeutic thoracentesis.      2. 500 mL of fluid withdrawn.      DX-CHEST-PORTABLE (1 VIEW)   Final Result         1. Moderate right pleural effusion and atelectasis unchanged.                  EC-ECHOCARDIOGRAM COMPLETE W/O CONT   Final Result      DX-CHEST-PORTABLE (1 VIEW)   Final Result      1.  Hypoinflation.   2.  Moderate right pleural effusion and underlying atelectasis or consolidation. As previously mentioned,  possible underlying pneumonia.   3.  Minimal left pleural effusion and subsegmental atelectatic change.      DX-CHEST-LIMITED (1 VIEW)   Final Result      Airspace opacity in the right lower lobe, concerning for pneumonia.      CT-CTA COMPLETE THORACOABDOMINAL AORTA   Final Result      1.  No evidence for aortic aneurysm or dissection.   2.  Loculated right pleural effusion with associated compressive atelectasis and consolidation.   3.  Masslike area of consolidation in the left posterior medial costophrenic angle. This may be due to infection or neoplasm. Clinical correlation and follow-up is needed.   4.  Hepatomegaly with fatty infiltration of the liver.                             Assessment/Plan  Problem Representation:    Pleural effusion on right- (present on admission)  Assessment & Plan  Small right-sided pleural effusion associated with atelectasis unknown the cause, patient denied any benefit symptom, smoking history.  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  Initial thoracocentesis was unsuccessful but subsequently was able to get 500 cc with low glucose level  Fluid removed 2025 (Glucose 12, TP 3.9, LDH 1803_ == infected and exudative  Intrapleural fibrinolytics with alteplase 10mg and Dnase 5 mg started on 2025  Lytic day # chest tube output around 600  Lytic day # chest tube output around 940  Today imagin.  Hazy right pulmonary infiltrates, stable  2.  Trace right pleural effusion, stable  Continue chest tube to suction  Pulmonary hygiene with OOB and early mobility  Continue with antibiotic Unasyn  Daily chest x-ray   Follow-up with cultures  Will continue follow-up

## 2025-05-23 NOTE — PROCEDURES
Procedure Date: 5/22/2025    Procedure: Intrapleual Fibrinolytics (18693)    Physician: Nena Walden MD    Anesthesia Type: N/A    Indication: complicated parapneumonic pleural effusion    Time Out: Patient was identified with two patient identifiers and site was confirmed.    Pre-Op Dx: complicated parapneumonic pleural effusion    Post-Op Dx: complicated parapneumonic pleural effusion    Medications:   Alteplase 10mg  Dornase Alpha 5mg     Description:    10mg of Alteplase and 5mg of Dornase were prepared by pharmacy in normal saline and maintained chilled until administration.  These solutions were mixed at bedside and the total fluid instillation was 60cc.  Patient was positioned in LEFT lateral decubitous position.  Chest tube connections were prepped and cleaned. Alteplase/Dornase solution was drawn up in the appropriate syringe and medications were then injected into the chest tube, avoiding the introduction of air. Chest tube connection to pleurvac was then reconnected. Chest tube was left clamped/closed. Patient was instructed to lie in lateral position x2 hours (60 minutes on the right and 60 minutes on the left).  Chest tube will be placed back on suction in 2 hours.    Complications: none    F/U: routine chest tube care      Abby Walden MD  Pulmonary and Critical Care Medicine  [facility]

## 2025-05-23 NOTE — PROGRESS NOTES
Received bedside report and assumed care of patient at change of shift. Patient is alert and oriented × 4, currently on RA. Patient reports no pain at this time. Initial assessment completed; Bed is in the lowest and locked position, call light and personal belongings placed within reach, and environment is safe and free of hazards. Fall risk precautions in place as appropriate. Patient verbalized understanding of plan of care and expressed no additional needs or concerns at this time. Will continue to monitor and reassess as needed.

## 2025-05-23 NOTE — PROGRESS NOTES
"Radiology Progress Note   Author: BENJAMIN Garcia Date & Time created: 5/23/2025  10:48 AM   Date of admission  5/14/2025  Note to reader: this note follows the APSO format rather than the historical SOAP format. Assessment and plan located at the top of the note for ease of use.    Chief Complaint  60 y.o. male admitted 5/14/2025 with   Chief Complaint   Patient presents with    Flank Pain     Right sided flank pain x 5 days, pt denies dysuria but reports pain is darker than normal. +nausea.        HPI  Patient \"is a 60 y.o. male who presented 5/14/2025 with shortness of breath and right sided flank pain x 5 days.  He has a hx of HTN, DLD.  He states he feels his flank pain was from a recent upper respiratory infection he was battling for past 2 weeks with ongoing fever, chills, and coughing. A CT chest showed a loculated right pleural effusion.  Initially he required pressor support. He was in atrial fib with RVR and hypoxic. An initial thoracentesis was unsuccessful. He had a thoracentesis with 500cc removed with improvement of his breathing.\" - MD Chris. IR was consulted for Right chest tube placement r/t loculation of right pleural effusion. IR Dr. Gallardo performed Right chest tube placement on 5/19/25.    Interval History:   05/20/25 - IR Right posterior 10F Chest tube to Pleur-Evac with 590 mL serosanguineous output in the last 24 hours. Chest tube to - 20 cm H2O suction; no leaks appreciated. Labs reviewed; WBC 6.8, H/H 11/33.3, Cr 0.71. Cultures pending. CXR this morning shows, \"Interval placement of small caliber RIGHT basilar chest tube; No pneumothorax; & Persistent moderate RIGHT pleural fluid collection with associated consolidation.\" SpO2 95% on room air. I discussed anticipated plan of care with patient. I discussed patient with hospital nursing staff and I reviewed IDT notes. Anticipate possible lytic treatment by pulm.    05/21/25 - IR Right posterior 10F Chest tube to Pleur-Evac with " "160 mL serosanguineous output in the last 24 hours. Chest tube clamped s/p lytic treatment by Pulm with patient laying with left side down; no leaks appreciated at time of assessment. Labs reviewed; WBC 6.5, H/H 11.4/34.4, Cr 0.77. Pleural fluid cultures (5/19) pending. CXR this morning delayed pending completion of lytic treatment. SpO2 94% on 1 Lpm Oxygen via NC. I discussed anticipated plan of care with patient. I reviewed IDT notes.    05/22/25 - Patient ambulating to restroom without difficulty. IR Right posterior 10F Chest tube to Pleur-Evac with 560 mL serosanguineous output in the last 24 hours. Chest tube to - 20 cm H2O suction; no leaks appreciated. Labs reviewed; WBC 7.2, H/H 11/34.1, Cr 0.73. Pleural fluid cultures (5/19) pending - NGTD. CXR from yesterday showed: \"Right pleural effusion appears slightly decreased with right pigtail chest tube in place. No pneumothorax.\" CXR today shows: \"Stable small right pleural effusion and associated atelectasis. No new consolidation. Right pigtail catheter tip projects over the right midlung, stable since prior. No pneumothorax.\" SpO2 90% on room air. I discussed anticipated plan of care with patient. I reviewed IDT notes.    05/23/25 - IR Right posterior 10F Chest tube to Pleur-Evac with 240 mL serosanguineous output in the last 24 hours. Chest tube to - 20 cm H2O suction; no leaks appreciated. Labs reviewed by me, including: WBC 8, H/H 11.3/34.3, Cr 0.7. Pleural fluid cultures (5/19) pending - NGTD. CXR today shows: \"Trace right pleural effusion, stable\" SpO2 92% on room air. I discussed anticipated plan of care with patient. I reviewed IDT notes.    Assessment/Plan     Principal Problem:    Atrial fibrillation with RVR (HCC)  Active Problems:    Sepsis (HCC)    Pleural effusion on right    QUE (acute kidney injury) ([facility])    High anion gap metabolic acidosis    Mass of lower lobe of left lung    Acute hypoxic respiratory failure (HCC)    Hypertension    Shock " (HCC)    Plan IR  - Continue Right posterior chest tube to suction -20 cm H2O; defer to pulm during/after lytic treatments if/when applicable.  - Thoracentesis fluid cultures (5/16) pending - NGTD.  - Pleural fluid cultures (5/19) pending - NGTD.  - Serial CXR  - Hospitalist and Pulmonary following.  - lytic therapy #1 by pulm: 5/21  - lytic therapy #2 by pulm: 5/22  - Recommend fu CT in 5-7 days or when output decreases to approx 100 mL out/24 hours  - Continue to monitor output, labs, and VS.  -  -Thank you for allowing Interventional Radiology team to participate in the patients care, if any additional care or requests are needed in the future please do not hesitate to call or place IR order.        Review of Systems  Physical Exam   Review of Systems   Constitutional:  Negative for chills, fever and malaise/fatigue.   Respiratory:  Negative for cough and shortness of breath.    Cardiovascular:  Negative for chest pain (minor discomfort at chest tube insertion site).   Gastrointestinal:  Negative for abdominal pain, nausea and vomiting.      Vitals:    05/23/25 0816   BP: 108/72   Pulse: 74   Resp: 17   Temp: 36.4 °C (97.6 °F)   SpO2: 92%     Physical Exam  Vitals and nursing note reviewed.   Constitutional:       General: He is not in acute distress.  Cardiovascular:      Rate and Rhythm: Normal rate.   Pulmonary:      Effort: Pulmonary effort is normal. No respiratory distress.      Comments: IR Right Posterior 10F Chest tube in place.  Abdominal:      Palpations: Abdomen is soft.      Tenderness: There is no abdominal tenderness.   Skin:     General: Skin is warm and dry.   Neurological:      General: No focal deficit present.      Mental Status: He is alert and oriented to person, place, and time.   Psychiatric:         Mood and Affect: Mood normal.         Behavior: Behavior normal.          Labs    Recent Labs     05/21/25  0200 05/22/25  0224 05/23/25  0254   WBC 6.5 7.2 8.0   RBC 3.83* 3.70* 3.83*    HEMOGLOBIN 11.4* 11.0* 11.3*   HEMATOCRIT 34.4* 34.1* 34.3*   MCV 89.8 92.2 89.6   MCH 29.8 29.7 29.5   MCHC 33.1 32.3 32.9   RDW 42.0 43.5 41.1   PLATELETCT 397 386 380   MPV 8.6* 9.0 8.6*     Recent Labs     05/21/25  0200 05/22/25  0224 05/23/25  0254   SODIUM 137 138 136   POTASSIUM 4.0 4.2 4.2   CHLORIDE 104 104 104   CO2 22 23 21   GLUCOSE 111* 95 106*   BUN 12 12 10   CREATININE 0.77 0.73 0.70   CALCIUM 8.1* 8.2* 8.1*     Recent Labs     05/21/25 0200 05/22/25 0224 05/23/25  0254   ALBUMIN 2.6* 2.8* 2.8*   TBILIRUBIN 0.3 0.3 0.3   ALKPHOSPHAT 180* 162* 156*   TOTPROTEIN 5.9* 6.0 6.2   ALTSGPT 49 49 60*   ASTSGOT 47* 34 38   CREATININE 0.77 0.73 0.70     DX-CHEST-LIMITED (1 VIEW)   Final Result         1.  Hazy right pulmonary infiltrates, stable   2.  Trace right pleural effusion, stable      DX-CHEST-LIMITED (1 VIEW)   Final Result      1.  Stable small right pleural effusion and associated atelectasis. No new consolidation.   2.  Right pigtail catheter tip projects over the right midlung, stable since prior. No pneumothorax.      DX-CHEST-2 VIEWS   Final Result      Right pleural effusion appears slightly decreased with right pigtail chest tube in place. No pneumothorax.      DX-CHEST-2 VIEWS   Final Result      1.  Interval placement of small caliber RIGHT basilar chest tube.   2.  No pneumothorax.   3.  Persistent moderate RIGHT pleural fluid collection with associated consolidation.      CT-CHEST TUBE PLACEMENT (EMPYEMA AND PNEUMO)   Final Result      Successful CT GUIDED right  CHEST TUBE PLACEMENT.      CT-CHEST (THORAX) W/O   Final Result      1.  Loculated right pleural effusion has increased from the prior CT. Adjacent airspace opacification may represent atelectasis or pneumonia.   2.  Small left pleural effusion with adjacent compressive atelectasis.   3.  Mild mediastinal adenopathy is likely reactive.         DX-CHEST-PORTABLE (1 VIEW)   Final Result      1.  Persistent right pleural  "effusion and associated atelectasis/consolidation.   2.  No pneumothorax status post right-sided thoracentesis.      US-THORACENTESIS PUNCTURE RIGHT   Final Result      1. Ultrasound guided right sided diagnostic and therapeutic thoracentesis.      2. 500 mL of fluid withdrawn.      DX-CHEST-PORTABLE (1 VIEW)   Final Result         1. Moderate right pleural effusion and atelectasis unchanged.                  EC-ECHOCARDIOGRAM COMPLETE W/O CONT   Final Result      DX-CHEST-PORTABLE (1 VIEW)   Final Result      1.  Hypoinflation.   2.  Moderate right pleural effusion and underlying atelectasis or consolidation. As previously mentioned, possible underlying pneumonia.   3.  Minimal left pleural effusion and subsegmental atelectatic change.      DX-CHEST-LIMITED (1 VIEW)   Final Result      Airspace opacity in the right lower lobe, concerning for pneumonia.      CT-CTA COMPLETE THORACOABDOMINAL AORTA   Final Result      1.  No evidence for aortic aneurysm or dissection.   2.  Loculated right pleural effusion with associated compressive atelectasis and consolidation.   3.  Masslike area of consolidation in the left posterior medial costophrenic angle. This may be due to infection or neoplasm. Clinical correlation and follow-up is needed.   4.  Hepatomegaly with fatty infiltration of the liver.                          INR   Date Value Ref Range Status   05/14/2025 1.15 (H) 0.87 - 1.13 Final     Comment:     INR - Non-therapeutic Reference Range: 0.87-1.13  INR - Therapeutic Reference Range: 2.0-4.0       No results found for: \"POCINR\"     Intake/Output Summary (Last 24 hours) at 5/20/2025 0909  Last data filed at 5/20/2025 0645  Gross per 24 hour   Intake --   Output 590 ml   Net -590 ml      I have personally reviewed the above labs and imaging      I have performed a physical exam and reviewed and updated ROS and Plan today (5/23/2025).     37 minutes in directly providing and coordinating care and extensive data " review.  No time overlap and excludes procedures.

## 2025-05-23 NOTE — CARE PLAN
The patient is Stable - Low risk of patient condition declining or worsening    Shift Goals  Clinical Goals: fall prevention, promote mobility and independance, support nutritional and hydration needs.  Patient Goals: rst and recovery  Family Goals: Updates    Progress made toward(s) clinical / shift goals:    Problem: Urinary - Renal Perfusion  Goal: Ability to achieve and maintain adequate renal perfusion and functioning will improve  Outcome: Progressing  Note: Monitored urine output, assessed for signs of dehydration, and educated the patient on importance of adequate fluid intake within prescribed limits       Patient is not progressing towards the following goals:

## 2025-05-23 NOTE — PROGRESS NOTES
Assumed patient care and received bedside report from Day RN. Patient is A&Ox4 and reports 0/10 pain at this time. Patient on RA, awake, and alert. Chest tube in place of R Upper back, dressing in place, CDI.  in place along with Q4H VS per policy.     Patient educated on the use of the call light and verbalized understanding. Bed in the lowest and locked position with personal belongings and call light within reach. Low Fall Risk precautions and hourly rounding in place. Safety education provided. POC discussed and patient declines any further needs at this time. Orders carried out.

## 2025-05-24 ENCOUNTER — APPOINTMENT (OUTPATIENT)
Dept: RADIOLOGY | Facility: MEDICAL CENTER | Age: 60
DRG: 871 | End: 2025-05-24
Attending: STUDENT IN AN ORGANIZED HEALTH CARE EDUCATION/TRAINING PROGRAM
Payer: COMMERCIAL

## 2025-05-24 LAB
ALBUMIN SERPL BCP-MCNC: 3.1 G/DL (ref 3.2–4.9)
ALBUMIN/GLOB SERPL: 0.8 G/DL
ALP SERPL-CCNC: 166 U/L (ref 30–99)
ALT SERPL-CCNC: 64 U/L (ref 2–50)
ANION GAP SERPL CALC-SCNC: 14 MMOL/L (ref 7–16)
AST SERPL-CCNC: 40 U/L (ref 12–45)
BASOPHILS # BLD AUTO: 0.5 % (ref 0–1.8)
BASOPHILS # BLD: 0.04 K/UL (ref 0–0.12)
BILIRUB SERPL-MCNC: 0.3 MG/DL (ref 0.1–1.5)
BUN SERPL-MCNC: 11 MG/DL (ref 8–22)
CALCIUM ALBUM COR SERPL-MCNC: 9.4 MG/DL (ref 8.5–10.5)
CALCIUM SERPL-MCNC: 8.7 MG/DL (ref 8.5–10.5)
CHLORIDE SERPL-SCNC: 102 MMOL/L (ref 96–112)
CO2 SERPL-SCNC: 21 MMOL/L (ref 20–33)
CREAT SERPL-MCNC: 0.87 MG/DL (ref 0.5–1.4)
EOSINOPHIL # BLD AUTO: 0.16 K/UL (ref 0–0.51)
EOSINOPHIL NFR BLD: 2 % (ref 0–6.9)
ERYTHROCYTE [DISTWIDTH] IN BLOOD BY AUTOMATED COUNT: 42.6 FL (ref 35.9–50)
GFR SERPLBLD CREATININE-BSD FMLA CKD-EPI: 98 ML/MIN/1.73 M 2
GLOBULIN SER CALC-MCNC: 4 G/DL (ref 1.9–3.5)
GLUCOSE SERPL-MCNC: 102 MG/DL (ref 65–99)
HCT VFR BLD AUTO: 39.7 % (ref 42–52)
HGB BLD-MCNC: 12.7 G/DL (ref 14–18)
IMM GRANULOCYTES # BLD AUTO: 0.05 K/UL (ref 0–0.11)
IMM GRANULOCYTES NFR BLD AUTO: 0.6 % (ref 0–0.9)
LYMPHOCYTES # BLD AUTO: 1.28 K/UL (ref 1–4.8)
LYMPHOCYTES NFR BLD: 15.7 % (ref 22–41)
MAGNESIUM SERPL-MCNC: 2.2 MG/DL (ref 1.5–2.5)
MCH RBC QN AUTO: 29.3 PG (ref 27–33)
MCHC RBC AUTO-ENTMCNC: 32 G/DL (ref 32.3–36.5)
MCV RBC AUTO: 91.7 FL (ref 81.4–97.8)
MONOCYTES # BLD AUTO: 0.62 K/UL (ref 0–0.85)
MONOCYTES NFR BLD AUTO: 7.6 % (ref 0–13.4)
NEUTROPHILS # BLD AUTO: 6 K/UL (ref 1.82–7.42)
NEUTROPHILS NFR BLD: 73.6 % (ref 44–72)
NRBC # BLD AUTO: 0 K/UL
NRBC BLD-RTO: 0 /100 WBC (ref 0–0.2)
PLATELET # BLD AUTO: 450 K/UL (ref 164–446)
PMV BLD AUTO: 8.6 FL (ref 9–12.9)
POTASSIUM SERPL-SCNC: 4.4 MMOL/L (ref 3.6–5.5)
PROT SERPL-MCNC: 7.1 G/DL (ref 6–8.2)
RBC # BLD AUTO: 4.33 M/UL (ref 4.7–6.1)
SODIUM SERPL-SCNC: 137 MMOL/L (ref 135–145)
WBC # BLD AUTO: 8.2 K/UL (ref 4.8–10.8)

## 2025-05-24 PROCEDURE — 99232 SBSQ HOSP IP/OBS MODERATE 35: CPT | Performed by: STUDENT IN AN ORGANIZED HEALTH CARE EDUCATION/TRAINING PROGRAM

## 2025-05-24 PROCEDURE — 700102 HCHG RX REV CODE 250 W/ 637 OVERRIDE(OP): Performed by: INTERNAL MEDICINE

## 2025-05-24 PROCEDURE — 700105 HCHG RX REV CODE 258: Performed by: STUDENT IN AN ORGANIZED HEALTH CARE EDUCATION/TRAINING PROGRAM

## 2025-05-24 PROCEDURE — A9270 NON-COVERED ITEM OR SERVICE: HCPCS | Performed by: INTERNAL MEDICINE

## 2025-05-24 PROCEDURE — 700102 HCHG RX REV CODE 250 W/ 637 OVERRIDE(OP): Performed by: STUDENT IN AN ORGANIZED HEALTH CARE EDUCATION/TRAINING PROGRAM

## 2025-05-24 PROCEDURE — A9270 NON-COVERED ITEM OR SERVICE: HCPCS | Performed by: STUDENT IN AN ORGANIZED HEALTH CARE EDUCATION/TRAINING PROGRAM

## 2025-05-24 PROCEDURE — A9270 NON-COVERED ITEM OR SERVICE: HCPCS | Performed by: NURSE PRACTITIONER

## 2025-05-24 PROCEDURE — 80053 COMPREHEN METABOLIC PANEL: CPT

## 2025-05-24 PROCEDURE — 700111 HCHG RX REV CODE 636 W/ 250 OVERRIDE (IP): Mod: JZ | Performed by: FAMILY MEDICINE

## 2025-05-24 PROCEDURE — 71045 X-RAY EXAM CHEST 1 VIEW: CPT

## 2025-05-24 PROCEDURE — 36415 COLL VENOUS BLD VENIPUNCTURE: CPT

## 2025-05-24 PROCEDURE — 85025 COMPLETE CBC W/AUTO DIFF WBC: CPT

## 2025-05-24 PROCEDURE — 700111 HCHG RX REV CODE 636 W/ 250 OVERRIDE (IP): Mod: JZ | Performed by: STUDENT IN AN ORGANIZED HEALTH CARE EDUCATION/TRAINING PROGRAM

## 2025-05-24 PROCEDURE — 71250 CT THORAX DX C-: CPT

## 2025-05-24 PROCEDURE — 770006 HCHG ROOM/CARE - MED/SURG/GYN SEMI*

## 2025-05-24 PROCEDURE — 700105 HCHG RX REV CODE 258: Performed by: FAMILY MEDICINE

## 2025-05-24 PROCEDURE — 83735 ASSAY OF MAGNESIUM: CPT

## 2025-05-24 PROCEDURE — 700102 HCHG RX REV CODE 250 W/ 637 OVERRIDE(OP): Performed by: NURSE PRACTITIONER

## 2025-05-24 RX ADMIN — AMPICILLIN AND SULBACTAM 3 G: 1; 2 INJECTION, POWDER, FOR SOLUTION INTRAMUSCULAR; INTRAVENOUS at 17:11

## 2025-05-24 RX ADMIN — GUAIFENESIN SYRUP AND DEXTROMETHORPHAN 5 ML: 100; 10 SYRUP ORAL at 06:00

## 2025-05-24 RX ADMIN — ACETAMINOPHEN 1000 MG: 500 TABLET ORAL at 19:01

## 2025-05-24 RX ADMIN — GUAIFENESIN SYRUP AND DEXTROMETHORPHAN 5 ML: 100; 10 SYRUP ORAL at 23:37

## 2025-05-24 RX ADMIN — GUAIFENESIN SYRUP AND DEXTROMETHORPHAN 5 ML: 100; 10 SYRUP ORAL at 11:14

## 2025-05-24 RX ADMIN — AMPICILLIN AND SULBACTAM 3 G: 1; 2 INJECTION, POWDER, FOR SOLUTION INTRAMUSCULAR; INTRAVENOUS at 06:03

## 2025-05-24 RX ADMIN — GUAIFENESIN SYRUP AND DEXTROMETHORPHAN 5 ML: 100; 10 SYRUP ORAL at 17:03

## 2025-05-24 RX ADMIN — ACETAMINOPHEN 1000 MG: 500 TABLET ORAL at 07:35

## 2025-05-24 RX ADMIN — AMPICILLIN AND SULBACTAM 3 G: 1; 2 INJECTION, POWDER, FOR SOLUTION INTRAMUSCULAR; INTRAVENOUS at 11:17

## 2025-05-24 RX ADMIN — METOPROLOL TARTRATE 25 MG: 25 TABLET, FILM COATED ORAL at 06:00

## 2025-05-24 RX ADMIN — METOPROLOL TARTRATE 25 MG: 25 TABLET, FILM COATED ORAL at 17:03

## 2025-05-24 RX ADMIN — AMPICILLIN AND SULBACTAM 3 G: 1; 2 INJECTION, POWDER, FOR SOLUTION INTRAMUSCULAR; INTRAVENOUS at 23:37

## 2025-05-24 ASSESSMENT — PATIENT HEALTH QUESTIONNAIRE - PHQ9
1. LITTLE INTEREST OR PLEASURE IN DOING THINGS: NOT AT ALL
2. FEELING DOWN, DEPRESSED, IRRITABLE, OR HOPELESS: NOT AT ALL
SUM OF ALL RESPONSES TO PHQ9 QUESTIONS 1 AND 2: 0

## 2025-05-24 ASSESSMENT — ENCOUNTER SYMPTOMS
VOMITING: 0
SHORTNESS OF BREATH: 0
FEVER: 0
COUGH: 0
FALLS: 0
ABDOMINAL PAIN: 0
CHILLS: 0
DIZZINESS: 0
CONSTITUTIONAL NEGATIVE: 1
FOCAL WEAKNESS: 0
NAUSEA: 0
EYE REDNESS: 0

## 2025-05-24 ASSESSMENT — FIBROSIS 4 INDEX: FIB4 SCORE: 0.68

## 2025-05-24 ASSESSMENT — PAIN DESCRIPTION - PAIN TYPE: TYPE: ACUTE PAIN

## 2025-05-24 NOTE — PROGRESS NOTES
Hospital Medicine Daily Progress Note    Date of Service  5/24/2025    Chief Complaint    Hospital Course  Marlo Gusman is a 60 y.o. male who presented 5/14/2025 with shortness of breath and right sided flank pain x 5 days.  He has a hx of HTN, DLD.  He states he feels his flank pain was from a recent upper respiratory infection he was battling for past 2 weeks with ongoing fever, chills, and coughing.      A CT chest showed a loculated right pleural effusion.  Initially he required pressor support. He was in atrial fib with RVR and hypoxic. An initial thoracentesis was unsuccessfulHe had a thoracentesis with 500cc removed with improvement of his breathing.    Interval Problem Update  No acute events overnight.  Patient feels well, on room air.  Received day 3 of intrapleural lytic therapy yesterday.  CXR today looks good, small pleural effusion on right.  CT chest ordered today per pulmonology.  Hopefully chest tube can be removed tomorrow if feasible.  Continue antibiotics for pneumonia.  Appreciate pulmonology and IR plan of care.  Anticipate discharge home in the next 24-48 hours.        I have discussed this patient's plan of care and discharge plan at IDT rounds today with Case Management, Nursing, Nursing leadership, and other members of the IDT team.    Consultants/Specialty  Pulmonology, interventional radiology, critical care      Code Status  Full Code    Disposition  Medically Cleared  I have placed the appropriate orders for post-discharge needs.    Review of Systems  Review of Systems   Constitutional: Negative.  Negative for chills and fever.   Cardiovascular:  Positive for chest pain.   Gastrointestinal:  Negative for nausea and vomiting.   Neurological:  Negative for dizziness.        Physical Exam  Temp:  [36.6 °C (97.8 °F)-37.2 °C (98.9 °F)] 37 °C (98.6 °F)  Pulse:  [76-87] 83  Resp:  [16-18] 17  BP: ()/(54-82) 102/69  SpO2:  [89 %-95 %] 89 %    Physical Exam  Vitals and nursing note  reviewed.   Constitutional:       Appearance: Normal appearance.   HENT:      Head: Normocephalic and atraumatic.   Eyes:      Extraocular Movements: Extraocular movements intact.   Cardiovascular:      Rate and Rhythm: Normal rate.      Heart sounds: Normal heart sounds. No murmur heard.  Pulmonary:      Effort: Pulmonary effort is normal. No respiratory distress.      Breath sounds: Normal breath sounds. No wheezing.   Abdominal:      General: Abdomen is flat. Bowel sounds are normal. There is no distension.   Musculoskeletal:         General: Normal range of motion.   Skin:     General: Skin is warm and dry.   Neurological:      General: No focal deficit present.      Mental Status: He is alert. Mental status is at baseline.   Psychiatric:         Mood and Affect: Mood normal.         Thought Content: Thought content normal.         Fluids    Intake/Output Summary (Last 24 hours) at 5/24/2025 1543  Last data filed at 5/24/2025 0633  Gross per 24 hour   Intake 240 ml   Output 760 ml   Net -520 ml        Laboratory  Recent Labs     05/22/25 0224 05/23/25  0254 05/24/25  0434   WBC 7.2 8.0 8.2   RBC 3.70* 3.83* 4.33*   HEMOGLOBIN 11.0* 11.3* 12.7*   HEMATOCRIT 34.1* 34.3* 39.7*   MCV 92.2 89.6 91.7   MCH 29.7 29.5 29.3   MCHC 32.3 32.9 32.0*   RDW 43.5 41.1 42.6   PLATELETCT 386 380 450*   MPV 9.0 8.6* 8.6*     Recent Labs     05/22/25 0224 05/23/25  0254 05/24/25  0434   SODIUM 138 136 137   POTASSIUM 4.2 4.2 4.4   CHLORIDE 104 104 102   CO2 23 21 21   GLUCOSE 95 106* 102*   BUN 12 10 11   CREATININE 0.73 0.70 0.87   CALCIUM 8.2* 8.1* 8.7                   Imaging  CT guided chest tube 5/19/2025    Assessment/Plan  * Atrial fibrillation with RVR (HCC)- (present on admission)  Assessment & Plan  Resolve of RVR  Continue metoprolol    Hypertension  Assessment & Plan  Metoprolol.  Hold outpatientHCTZrestart losartan and amlodipine as BP dictate/allows  Vitals:    05/17/25 1232   BP: 126/85   Pulse: (!) 108   Resp:  20   Temp: 36.8 °C (98.2 °F)   SpO2: 92%     Stable.    Acute hypoxic respiratory failure (HCC)- (present on admission)  Assessment & Plan  Concern of recent URI and probable post obstructive pneumonia.  Thoracentesis with 500cc removed  Follow up on pleural effusion evaluation  Titrate oxygen  Mobilize  Antitussives as need    Mass of lower lobe of left lung- (present on admission)  Assessment & Plan  Mass vs consolidation  Follow up on pleural effusion cytology  May need future follow up CT and or biopsy.  Treating as infection at this time.    Pulmonology following    High anion gap metabolic acidosis- (present on admission)  Assessment & Plan  resolve    QUE (acute kidney injury) (HCC)- (present on admission)  Assessment & Plan  Suspected prerenal and hypovolemia  Improved with IV fluids and resolve of Afib rvr          Pleural effusion on right- (present on admission)  Assessment & Plan  Had a thoracentesis.  F/U post thoracentesis xray still with opacification of right lower lung  Pulmonology following, continue intrapleural lytic therapy, completed 3 days tx  Effusion improving  CT chest ordered    Sepsis (HCC)- (present on admission)  Assessment & Plan  S/p pressors and has had IV fluid resuscitation  Monitor I/O's, labs, and vitals.    Follow pleural fluid cultures         VTE prophylaxis: VTE Selection    I have performed a physical exam and reviewed and updated ROS and Plan today (5/24/2025). In review of yesterday's note (5/23/2025), there are no changes except as documented above.

## 2025-05-24 NOTE — CARE PLAN
The patient is Stable - Low risk of patient condition declining or worsening    Shift Goals  Clinical Goals: Patient will remain hemodynamically stable, improve ventilation and report a tolerable pain level  Patient Goals: Sleep  Family Goals: Updates    Progress made toward(s) clinical / shift goals:  Patient on Q4H VS per policy of chest tube. Patient has remained hemodynamically stable. Patient stating well on RA, no reports of SOB or dyspnea. Patient O2 saturation remained in the 90% this shift. No evidence of fever. Chest tube remained intact, no evidence of dislodgement or subcutaneous emphysema. Tidaling present throughout shift.       Problem: Knowledge Deficit - Standard  Goal: Patient and family/care givers will demonstrate understanding of plan of care, disease process/condition, diagnostic tests and medications  Outcome: Progressing     Problem: Hemodynamics  Goal: Patient's hemodynamics, fluid balance and neurologic status will be stable or improve  Description: Target End Date:  Prior to discharge or change in level of careDocument on Assessment and I/O flowsheet templates1.  Monitor vital signs, pulse oximetry and cardiac monitor per provider order and/or policy2.  Maintain blood pressure per provider order3.  Hemodynamic monitoring per provider order4.  Manage IV fluids and IV infusions5.  Monitor intake and output6.  Daily weights per unit policy or provider order7.  Assess peripheral pulses and capillary refill8.  Assess color and body temperature9.  Position patient for maximum circulation/cardiac efaryp23. Monitor for signs/symptoms of excessive [id number]. Assess mental status, restlessness and changes in level of [id number]. Monitor temperature and report fever or hypothermia to provider immediately. Consideration of targeted temperature management.  Outcome: Progressing     Problem: Respiratory  Goal: Patient will achieve/maintain optimum respiratory ventilation and gas  exchange  Description: Target End Date:  Prior to discharge or change in level of careDocument on Assessment flowsheet1.  Assess and monitor rate, rhythm, depth and effort of respiration2.  Breath sounds assessed qshift and/or as needed3.  Assess O2 saturation, administer/titrate oxygen as ordered4.  Position patient for maximum ventilatory efficiency5.  Turn, cough, and deep breath with splinting to improve effectiveness6.  Collaborate with RT to administer medication/treatments per order7.  Encourage use of incentive spirometer and encourage patient to cough after use and utilize splinting techniques if applicable8.  Airway suctioning9.  Monitor sputum production for changes in color, consistency and [id number]. Perform frequent oral gogivrw26. Alternate physical activity with rest periods  Outcome: Progressing       Patient is not progressing towards the following goals:

## 2025-05-24 NOTE — PROGRESS NOTES
"Radiology Progress Note   Author: BENJAMIN Garcia Date & Time created: 5/24/2025  3:43 PM   Date of admission  5/14/2025  Note to reader: this note follows the APSO format rather than the historical SOAP format. Assessment and plan located at the top of the note for ease of use.    Chief Complaint  60 y.o. male admitted 5/14/2025 with   Chief Complaint   Patient presents with    Flank Pain     Right sided flank pain x 5 days, pt denies dysuria but reports pain is darker than normal. +nausea.        HPI  Patient \"is a 60 y.o. male who presented 5/14/2025 with shortness of breath and right sided flank pain x 5 days.  He has a hx of HTN, DLD.  He states he feels his flank pain was from a recent upper respiratory infection he was battling for past 2 weeks with ongoing fever, chills, and coughing. A CT chest showed a loculated right pleural effusion.  Initially he required pressor support. He was in atrial fib with RVR and hypoxic. An initial thoracentesis was unsuccessful. He had a thoracentesis with 500cc removed with improvement of his breathing.\" - MD Chris. IR was consulted for Right chest tube placement r/t loculation of right pleural effusion. IR Dr. Gallardo performed Right chest tube placement on 5/19/25.    Interval History:   05/20/25 - IR Right posterior 10F Chest tube to Pleur-Evac with 590 mL serosanguineous output in the last 24 hours. Chest tube to - 20 cm H2O suction; no leaks appreciated. Labs reviewed; WBC 6.8, H/H 11/33.3, Cr 0.71. Cultures pending. CXR this morning shows, \"Interval placement of small caliber RIGHT basilar chest tube; No pneumothorax; & Persistent moderate RIGHT pleural fluid collection with associated consolidation.\" SpO2 95% on room air. I discussed anticipated plan of care with patient. I discussed patient with hospital nursing staff and I reviewed IDT notes. Anticipate possible lytic treatment by pulm.    05/21/25 - IR Right posterior 10F Chest tube to Pleur-Evac with " "160 mL serosanguineous output in the last 24 hours. Chest tube clamped s/p lytic treatment by Pulm with patient laying with left side down; no leaks appreciated at time of assessment. Labs reviewed; WBC 6.5, H/H 11.4/34.4, Cr 0.77. Pleural fluid cultures (5/19) pending. CXR this morning delayed pending completion of lytic treatment. SpO2 94% on 1 Lpm Oxygen via NC. I discussed anticipated plan of care with patient. I reviewed IDT notes.    05/22/25 - Patient ambulating to restroom without difficulty. IR Right posterior 10F Chest tube to Pleur-Evac with 560 mL serosanguineous output in the last 24 hours. Chest tube to - 20 cm H2O suction; no leaks appreciated. Labs reviewed; WBC 7.2, H/H 11/34.1, Cr 0.73. Pleural fluid cultures (5/19) pending - NGTD. CXR from yesterday showed: \"Right pleural effusion appears slightly decreased with right pigtail chest tube in place. No pneumothorax.\" CXR today shows: \"Stable small right pleural effusion and associated atelectasis. No new consolidation. Right pigtail catheter tip projects over the right midlung, stable since prior. No pneumothorax.\" SpO2 90% on room air. I discussed anticipated plan of care with patient. I reviewed IDT notes.    05/23/25 - IR Right posterior 10F Chest tube to Pleur-Evac with 240 mL serosanguineous output in the last 24 hours. Chest tube to - 20 cm H2O suction; no leaks appreciated. Labs reviewed by me, including: WBC 8, H/H 11.3/34.3, Cr 0.7. Pleural fluid cultures (5/19) pending - NGTD. CXR today shows: \"Trace right pleural effusion, stable\" SpO2 92% on room air. I discussed anticipated plan of care with patient. I reviewed IDT notes.    05/24/25 - IR Right posterior 10F Chest tube to Pleur-Evac with 60 mL serosanguineous output in the last 24 hours. Chest tube to - 20 cm H2O suction; no leaks appreciated. Labs reviewed by me, including: WBC 8.2, H/H 12.7/39.7, Cr 0.87. Pleural fluid cultures (5/19) negative. CXR today shows: \"Right pleural pigtail " "drain remains. No pneumothorax visible. Mild right lower lung nodular opacity remains. Possible effusion.\" SpO2 92% on room air. I discussed anticipated plan of care with patient, Hospitalist, and Pulmonary. I reviewed IDT notes. CT today per Pulm, anticipate chest tube removal likely tomorrow.    Assessment/Plan     Principal Problem:    Atrial fibrillation with RVR (HCC)  Active Problems:    Sepsis (HCC)    Pleural effusion on right    QUE (acute kidney injury) (HCC)    High anion gap metabolic acidosis    Mass of lower lobe of left lung    Acute hypoxic respiratory failure (HCC)    Hypertension    Shock (HCC)    Plan IR  - Continue Right posterior chest tube to suction -20 cm H2O; defer to pulm during/after lytic treatments if/when applicable.  - Thoracentesis fluid cultures (5/16) pending - NGTD.  - Pleural fluid cultures (5/19) negative.  - Serial CXR  - Hospitalist and Pulmonary following.  - lytic therapy #1 by pulm: 5/21  - lytic therapy #2 by pulm: 5/22  - lytic therapy #3 by pulm: 5/23  - Recommend fu CT in 5-7 days or when output decreases to approx 100 mL out/24 hours  - Continue to monitor output, labs, and VS.  -  -Thank you for allowing Interventional Radiology team to participate in the patients care, if any additional care or requests are needed in the future please do not hesitate to call or place IR order.        Review of Systems  Physical Exam   Review of Systems   Constitutional:  Negative for chills, fever and malaise/fatigue.   Respiratory:  Negative for cough and shortness of breath.    Cardiovascular:  Negative for chest pain (minor discomfort at chest tube insertion site).   Gastrointestinal:  Negative for abdominal pain, nausea and vomiting.      Vitals:    05/24/25 1511   BP: 102/69   Pulse: 83   Resp: 17   Temp: 37 °C (98.6 °F)   SpO2: 89%     Physical Exam  Vitals and nursing note reviewed.   Constitutional:       General: He is not in acute distress.  Cardiovascular:      Rate and " Rhythm: Normal rate.   Pulmonary:      Effort: Pulmonary effort is normal. No respiratory distress.      Comments: IR Right Posterior 10F Chest tube in place.  Abdominal:      Palpations: Abdomen is soft.      Tenderness: There is no abdominal tenderness.   Skin:     General: Skin is warm and dry.   Neurological:      General: No focal deficit present.      Mental Status: He is alert and oriented to person, place, and time.   Psychiatric:         Mood and Affect: Mood normal.         Behavior: Behavior normal.          Labs    Recent Labs     05/22/25 0224 05/23/25 0254 05/24/25  0434   WBC 7.2 8.0 8.2   RBC 3.70* 3.83* 4.33*   HEMOGLOBIN 11.0* 11.3* 12.7*   HEMATOCRIT 34.1* 34.3* 39.7*   MCV 92.2 89.6 91.7   MCH 29.7 29.5 29.3   MCHC 32.3 32.9 32.0*   RDW 43.5 41.1 42.6   PLATELETCT 386 380 450*   MPV 9.0 8.6* 8.6*     Recent Labs     05/22/25 0224 05/23/25  0254 05/24/25  0434   SODIUM 138 136 137   POTASSIUM 4.2 4.2 4.4   CHLORIDE 104 104 102   CO2 23 21 21   GLUCOSE 95 106* 102*   BUN 12 10 11   CREATININE 0.73 0.70 0.87   CALCIUM 8.2* 8.1* 8.7     Recent Labs     05/22/25 0224 05/23/25  0254 05/24/25  0434   ALBUMIN 2.8* 2.8* 3.1*   TBILIRUBIN 0.3 0.3 0.3   ALKPHOSPHAT 162* 156* 166*   TOTPROTEIN 6.0 6.2 7.1   ALTSGPT 49 60* 64*   ASTSGOT 34 38 40   CREATININE 0.73 0.70 0.87     DX-CHEST-LIMITED (1 VIEW)   Final Result         1. No change.      2. Right pleural pigtail drain. No pneumothorax. Possible trace effusion.      3. Persistent mild right basilar opacity.                     DX-CHEST-LIMITED (1 VIEW)   Final Result         1.  Hazy right pulmonary infiltrates, stable   2.  Trace right pleural effusion, stable      DX-CHEST-LIMITED (1 VIEW)   Final Result      1.  Stable small right pleural effusion and associated atelectasis. No new consolidation.   2.  Right pigtail catheter tip projects over the right midlung, stable since prior. No pneumothorax.      DX-CHEST-2 VIEWS   Final Result      Right  pleural effusion appears slightly decreased with right pigtail chest tube in place. No pneumothorax.      DX-CHEST-2 VIEWS   Final Result      1.  Interval placement of small caliber RIGHT basilar chest tube.   2.  No pneumothorax.   3.  Persistent moderate RIGHT pleural fluid collection with associated consolidation.      CT-CHEST TUBE PLACEMENT (EMPYEMA AND PNEUMO)   Final Result      Successful CT GUIDED right  CHEST TUBE PLACEMENT.      CT-CHEST (THORAX) W/O   Final Result      1.  Loculated right pleural effusion has increased from the prior CT. Adjacent airspace opacification may represent atelectasis or pneumonia.   2.  Small left pleural effusion with adjacent compressive atelectasis.   3.  Mild mediastinal adenopathy is likely reactive.         DX-CHEST-PORTABLE (1 VIEW)   Final Result      1.  Persistent right pleural effusion and associated atelectasis/consolidation.   2.  No pneumothorax status post right-sided thoracentesis.      US-THORACENTESIS PUNCTURE RIGHT   Final Result      1. Ultrasound guided right sided diagnostic and therapeutic thoracentesis.      2. 500 mL of fluid withdrawn.      DX-CHEST-PORTABLE (1 VIEW)   Final Result         1. Moderate right pleural effusion and atelectasis unchanged.                  EC-ECHOCARDIOGRAM COMPLETE W/O CONT   Final Result      DX-CHEST-PORTABLE (1 VIEW)   Final Result      1.  Hypoinflation.   2.  Moderate right pleural effusion and underlying atelectasis or consolidation. As previously mentioned, possible underlying pneumonia.   3.  Minimal left pleural effusion and subsegmental atelectatic change.      DX-CHEST-LIMITED (1 VIEW)   Final Result      Airspace opacity in the right lower lobe, concerning for pneumonia.      CT-CTA COMPLETE THORACOABDOMINAL AORTA   Final Result      1.  No evidence for aortic aneurysm or dissection.   2.  Loculated right pleural effusion with associated compressive atelectasis and consolidation.   3.  Masslike area of  "consolidation in the left posterior medial costophrenic angle. This may be due to infection or neoplasm. Clinical correlation and follow-up is needed.   4.  Hepatomegaly with fatty infiltration of the liver.                        CT-CHEST (THORAX) W/O    (Results Pending)     INR   Date Value Ref Range Status   05/14/2025 1.15 (H) 0.87 - 1.13 Final     Comment:     INR - Non-therapeutic Reference Range: 0.87-1.13  INR - Therapeutic Reference Range: 2.0-4.0       No results found for: \"POCINR\"     Intake/Output Summary (Last 24 hours) at 5/20/2025 0909  Last data filed at 5/20/2025 0645  Gross per 24 hour   Intake --   Output 590 ml   Net -590 ml      I have personally reviewed the above labs and imaging      I have performed a physical exam and reviewed and updated ROS and Plan today (5/24/2025).     37 minutes in directly providing and coordinating care and extensive data review.  No time overlap and excludes procedures.   "

## 2025-05-24 NOTE — CARE PLAN
The patient is Stable - Low risk of patient condition declining or worsening    Shift Goals  Clinical Goals: fall prevention, promote mobility and independance, support nutritional and hydration needs.  Patient Goals: rst and recovery  Family Goals: Updates    Progress made toward(s) clinical / shift goals:    Problem: Knowledge Deficit - Standard  Goal: Patient and family/care givers will demonstrate understanding of plan of care, disease process/condition, diagnostic tests and medications  Outcome: Progressing  Note: Assessed patient's understanding, provided clear education, and reinforced key information, to promote comprehension and adherence.     Problem: Fall Risk  Goal: Patient will remain free from falls  Outcome: Progressing  Note: Implemented fall precautions, educated the patient on safety measures, and assisted with mobility as needed.       Patient is not progressing towards the following goals:

## 2025-05-24 NOTE — PROGRESS NOTES
Received bedside report and assumed care of patient at change of shift. Patient is alert and oriented × 4, currently on RA. Patient sleep calm at this time. Initial assessment completed; Bed is in the lowest and locked position, call light and personal belongings placed within reach, and environment is safe and free of hazards. Fall risk precautions in place as appropriate. Patient verbalized understanding of plan of care and expressed no additional needs or concerns at this time. Will continue to monitor and reassess as needed.

## 2025-05-25 ENCOUNTER — APPOINTMENT (OUTPATIENT)
Dept: RADIOLOGY | Facility: MEDICAL CENTER | Age: 60
DRG: 871 | End: 2025-05-25
Attending: STUDENT IN AN ORGANIZED HEALTH CARE EDUCATION/TRAINING PROGRAM
Payer: COMMERCIAL

## 2025-05-25 LAB
ALBUMIN SERPL BCP-MCNC: 2.9 G/DL (ref 3.2–4.9)
ALBUMIN/GLOB SERPL: 0.8 G/DL
ALP SERPL-CCNC: 157 U/L (ref 30–99)
ALT SERPL-CCNC: 71 U/L (ref 2–50)
ANION GAP SERPL CALC-SCNC: 12 MMOL/L (ref 7–16)
AST SERPL-CCNC: 42 U/L (ref 12–45)
BASOPHILS # BLD AUTO: 0.3 % (ref 0–1.8)
BASOPHILS # BLD: 0.03 K/UL (ref 0–0.12)
BILIRUB SERPL-MCNC: 0.3 MG/DL (ref 0.1–1.5)
BUN SERPL-MCNC: 14 MG/DL (ref 8–22)
CALCIUM ALBUM COR SERPL-MCNC: 9 MG/DL (ref 8.5–10.5)
CALCIUM SERPL-MCNC: 8.1 MG/DL (ref 8.5–10.5)
CHLORIDE SERPL-SCNC: 104 MMOL/L (ref 96–112)
CO2 SERPL-SCNC: 21 MMOL/L (ref 20–33)
CREAT SERPL-MCNC: 0.69 MG/DL (ref 0.5–1.4)
EOSINOPHIL # BLD AUTO: 0.19 K/UL (ref 0–0.51)
EOSINOPHIL NFR BLD: 2.2 % (ref 0–6.9)
ERYTHROCYTE [DISTWIDTH] IN BLOOD BY AUTOMATED COUNT: 41.6 FL (ref 35.9–50)
GFR SERPLBLD CREATININE-BSD FMLA CKD-EPI: 105 ML/MIN/1.73 M 2
GLOBULIN SER CALC-MCNC: 3.5 G/DL (ref 1.9–3.5)
GLUCOSE SERPL-MCNC: 102 MG/DL (ref 65–99)
HCT VFR BLD AUTO: 34.9 % (ref 42–52)
HGB BLD-MCNC: 11.2 G/DL (ref 14–18)
IMM GRANULOCYTES # BLD AUTO: 0.05 K/UL (ref 0–0.11)
IMM GRANULOCYTES NFR BLD AUTO: 0.6 % (ref 0–0.9)
LYMPHOCYTES # BLD AUTO: 1.15 K/UL (ref 1–4.8)
LYMPHOCYTES NFR BLD: 13.4 % (ref 22–41)
MAGNESIUM SERPL-MCNC: 2.1 MG/DL (ref 1.5–2.5)
MCH RBC QN AUTO: 28.9 PG (ref 27–33)
MCHC RBC AUTO-ENTMCNC: 32.1 G/DL (ref 32.3–36.5)
MCV RBC AUTO: 90.2 FL (ref 81.4–97.8)
MONOCYTES # BLD AUTO: 0.68 K/UL (ref 0–0.85)
MONOCYTES NFR BLD AUTO: 7.9 % (ref 0–13.4)
NEUTROPHILS # BLD AUTO: 6.48 K/UL (ref 1.82–7.42)
NEUTROPHILS NFR BLD: 75.6 % (ref 44–72)
NRBC # BLD AUTO: 0 K/UL
NRBC BLD-RTO: 0 /100 WBC (ref 0–0.2)
PLATELET # BLD AUTO: 394 K/UL (ref 164–446)
PMV BLD AUTO: 8.7 FL (ref 9–12.9)
POTASSIUM SERPL-SCNC: 4 MMOL/L (ref 3.6–5.5)
PROT SERPL-MCNC: 6.4 G/DL (ref 6–8.2)
RBC # BLD AUTO: 3.87 M/UL (ref 4.7–6.1)
SODIUM SERPL-SCNC: 137 MMOL/L (ref 135–145)
WBC # BLD AUTO: 8.6 K/UL (ref 4.8–10.8)

## 2025-05-25 PROCEDURE — 700102 HCHG RX REV CODE 250 W/ 637 OVERRIDE(OP): Performed by: STUDENT IN AN ORGANIZED HEALTH CARE EDUCATION/TRAINING PROGRAM

## 2025-05-25 PROCEDURE — A9270 NON-COVERED ITEM OR SERVICE: HCPCS | Performed by: INTERNAL MEDICINE

## 2025-05-25 PROCEDURE — 71045 X-RAY EXAM CHEST 1 VIEW: CPT

## 2025-05-25 PROCEDURE — 85025 COMPLETE CBC W/AUTO DIFF WBC: CPT

## 2025-05-25 PROCEDURE — 700105 HCHG RX REV CODE 258: Performed by: STUDENT IN AN ORGANIZED HEALTH CARE EDUCATION/TRAINING PROGRAM

## 2025-05-25 PROCEDURE — 99232 SBSQ HOSP IP/OBS MODERATE 35: CPT | Performed by: STUDENT IN AN ORGANIZED HEALTH CARE EDUCATION/TRAINING PROGRAM

## 2025-05-25 PROCEDURE — 700111 HCHG RX REV CODE 636 W/ 250 OVERRIDE (IP): Mod: JZ | Performed by: STUDENT IN AN ORGANIZED HEALTH CARE EDUCATION/TRAINING PROGRAM

## 2025-05-25 PROCEDURE — 770006 HCHG ROOM/CARE - MED/SURG/GYN SEMI*

## 2025-05-25 PROCEDURE — 36415 COLL VENOUS BLD VENIPUNCTURE: CPT

## 2025-05-25 PROCEDURE — 83735 ASSAY OF MAGNESIUM: CPT

## 2025-05-25 PROCEDURE — A9270 NON-COVERED ITEM OR SERVICE: HCPCS | Performed by: NURSE PRACTITIONER

## 2025-05-25 PROCEDURE — 700102 HCHG RX REV CODE 250 W/ 637 OVERRIDE(OP): Performed by: INTERNAL MEDICINE

## 2025-05-25 PROCEDURE — 80053 COMPREHEN METABOLIC PANEL: CPT

## 2025-05-25 PROCEDURE — 700102 HCHG RX REV CODE 250 W/ 637 OVERRIDE(OP): Performed by: NURSE PRACTITIONER

## 2025-05-25 PROCEDURE — A9270 NON-COVERED ITEM OR SERVICE: HCPCS | Performed by: STUDENT IN AN ORGANIZED HEALTH CARE EDUCATION/TRAINING PROGRAM

## 2025-05-25 RX ORDER — HYDRALAZINE HYDROCHLORIDE 20 MG/ML
20 INJECTION INTRAMUSCULAR; INTRAVENOUS EVERY 6 HOURS PRN
Status: DISCONTINUED | OUTPATIENT
Start: 2025-05-25 | End: 2025-05-25

## 2025-05-25 RX ADMIN — ACETAMINOPHEN 1000 MG: 500 TABLET ORAL at 04:22

## 2025-05-25 RX ADMIN — GUAIFENESIN SYRUP AND DEXTROMETHORPHAN 5 ML: 100; 10 SYRUP ORAL at 17:19

## 2025-05-25 RX ADMIN — AMPICILLIN AND SULBACTAM 3 G: 1; 2 INJECTION, POWDER, FOR SOLUTION INTRAMUSCULAR; INTRAVENOUS at 11:22

## 2025-05-25 RX ADMIN — AMPICILLIN AND SULBACTAM 3 G: 1; 2 INJECTION, POWDER, FOR SOLUTION INTRAMUSCULAR; INTRAVENOUS at 05:45

## 2025-05-25 RX ADMIN — GUAIFENESIN SYRUP AND DEXTROMETHORPHAN 5 ML: 100; 10 SYRUP ORAL at 05:32

## 2025-05-25 RX ADMIN — GUAIFENESIN SYRUP AND DEXTROMETHORPHAN 5 ML: 100; 10 SYRUP ORAL at 11:21

## 2025-05-25 RX ADMIN — AMPICILLIN AND SULBACTAM 3 G: 1; 2 INJECTION, POWDER, FOR SOLUTION INTRAMUSCULAR; INTRAVENOUS at 17:23

## 2025-05-25 RX ADMIN — METOPROLOL TARTRATE 25 MG: 25 TABLET, FILM COATED ORAL at 05:32

## 2025-05-25 RX ADMIN — ACETAMINOPHEN 1000 MG: 500 TABLET ORAL at 17:42

## 2025-05-25 RX ADMIN — METOPROLOL TARTRATE 25 MG: 25 TABLET, FILM COATED ORAL at 17:19

## 2025-05-25 ASSESSMENT — PATIENT HEALTH QUESTIONNAIRE - PHQ9
2. FEELING DOWN, DEPRESSED, IRRITABLE, OR HOPELESS: NOT AT ALL
1. LITTLE INTEREST OR PLEASURE IN DOING THINGS: NOT AT ALL
SUM OF ALL RESPONSES TO PHQ9 QUESTIONS 1 AND 2: 0

## 2025-05-25 ASSESSMENT — ENCOUNTER SYMPTOMS
FEVER: 0
ABDOMINAL PAIN: 0
FOCAL WEAKNESS: 0
DIZZINESS: 0
SHORTNESS OF BREATH: 0
FALLS: 0
VOMITING: 0
CHILLS: 0
COUGH: 0
CONSTITUTIONAL NEGATIVE: 1
EYE REDNESS: 0
NAUSEA: 0

## 2025-05-25 ASSESSMENT — PAIN DESCRIPTION - PAIN TYPE
TYPE: ACUTE PAIN
TYPE: ACUTE PAIN

## 2025-05-25 NOTE — PROCEDURES
Procedure Date: 5/23/2025    Procedure: Intrapleual Fibrinolytics (46574)    Physician: Nena Walden MD    Anesthesia Type: N/A    Indication: complicated parapneumonic pleural effusion    Time Out: Patient was identified with two patient identifiers and site was confirmed.    Pre-Op Dx: complicated parapneumonic pleural effusion    Post-Op Dx: complicated parapneumonic pleural effusion    Medications:   Alteplase 10mg  Dornase Alpha 5mg     Description:    10mg of Alteplase and 5mg of Dornase were prepared by pharmacy in normal saline and maintained chilled until administration.  These solutions were mixed at bedside and the total fluid instillation was 60cc.  Patient was positioned in LEFT lateral decubitous position.  Chest tube connections were prepped and cleaned. Alteplase/Dornase solution was drawn up in the appropriate syringe and medications were then injected into the chest tube, avoiding the introduction of air. Chest tube connection to pleurvac was then reconnected. Chest tube was left clamped/closed. Patient was instructed to lie in lateral position x2 hours (60 minutes on the right and 60 minutes on the left).  Chest tube will be placed back on suction in 2 hours.    Complications: none    F/U: routine chest tube care      Abby Walden MD  Pulmonary and Critical Care Medicine  [facility]

## 2025-05-25 NOTE — CARE PLAN
The patient is Stable - Low risk of patient condition declining or worsening    Shift Goals  Clinical Goals: manage pain as needed, comdort  Patient Goals: rest  Family Goals: not present    Progress made toward(s) clinical / shift goals:    Problem: Knowledge Deficit - Standard  Goal: Patient and family/care givers will demonstrate understanding of plan of care, disease process/condition, diagnostic tests and medications  Outcome: Progressing     Problem: Pain - Standard  Goal: Alleviation of pain or a reduction in pain to the patient’s comfort goal  Outcome: Progressing       Patient is not progressing towards the following goals:

## 2025-05-25 NOTE — PROGRESS NOTES
Hospital Medicine Daily Progress Note    Date of Service  5/25/2025    Chief Complaint    Hospital Course  Marlo Gusman is a 60 y.o. male who presented 5/14/2025 with shortness of breath and right sided flank pain x 5 days.  He has a hx of HTN, DLD.  He states he feels his flank pain was from a recent upper respiratory infection he was battling for past 2 weeks with ongoing fever, chills, and coughing.      A CT chest showed a loculated right pleural effusion.  Initially he required pressor support. He was in atrial fib with RVR and hypoxic. An initial thoracentesis was unsuccessfulHe had a thoracentesis with 500cc removed with improvement of his breathing.    Interval Problem Update  No acute events overnight.  Patient feels well.  CT chest shows residual small pleural effusion.  Per pulmonology review, CT with loculated effusion still.  Plan for surgical decortication tentatively Tuesday. NPO Monday night.  Continue antibiotics for pneumonia.  Appreciate pulmonology and IR plan of care.        I have discussed this patient's plan of care and discharge plan at IDT rounds today with Case Management, Nursing, Nursing leadership, and other members of the IDT team.    Consultants/Specialty  Pulmonology, interventional radiology, critical care      Code Status  Full Code    Disposition  Medically Cleared  I have placed the appropriate orders for post-discharge needs.    Review of Systems  Review of Systems   Constitutional: Negative.  Negative for chills and fever.   Cardiovascular:  Positive for chest pain.   Gastrointestinal:  Negative for nausea and vomiting.   Neurological:  Negative for dizziness.        Physical Exam  Temp:  [36.7 °C (98 °F)-37.2 °C (98.9 °F)] 36.7 °C (98 °F)  Pulse:  [79-88] 79  Resp:  [14-18] 14  BP: (102-131)/(69-83) 112/80  SpO2:  [89 %-95 %] 95 %    Physical Exam  Vitals and nursing note reviewed.   Constitutional:       Appearance: Normal appearance.   HENT:      Head: Normocephalic and  atraumatic.   Eyes:      Extraocular Movements: Extraocular movements intact.   Cardiovascular:      Rate and Rhythm: Normal rate.      Heart sounds: Normal heart sounds. No murmur heard.  Pulmonary:      Effort: Pulmonary effort is normal. No respiratory distress.      Breath sounds: Normal breath sounds. No wheezing.   Abdominal:      General: Abdomen is flat. Bowel sounds are normal. There is no distension.   Musculoskeletal:         General: Normal range of motion.   Skin:     General: Skin is warm and dry.   Neurological:      General: No focal deficit present.      Mental Status: He is alert. Mental status is at baseline.   Psychiatric:         Mood and Affect: Mood normal.         Thought Content: Thought content normal.         Fluids    Intake/Output Summary (Last 24 hours) at 5/25/2025 1340  Last data filed at 5/25/2025 0643  Gross per 24 hour   Intake 240 ml   Output 1510 ml   Net -1270 ml        Laboratory  Recent Labs     05/23/25  0254 05/24/25  0434 05/25/25  0117   WBC 8.0 8.2 8.6   RBC 3.83* 4.33* 3.87*   HEMOGLOBIN 11.3* 12.7* 11.2*   HEMATOCRIT 34.3* 39.7* 34.9*   MCV 89.6 91.7 90.2   MCH 29.5 29.3 28.9   MCHC 32.9 32.0* 32.1*   RDW 41.1 42.6 41.6   PLATELETCT 380 450* 394   MPV 8.6* 8.6* 8.7*     Recent Labs     05/23/25  0254 05/24/25  0434 05/25/25  0117   SODIUM 136 137 137   POTASSIUM 4.2 4.4 4.0   CHLORIDE 104 102 104   CO2 21 21 21   GLUCOSE 106* 102* 102*   BUN 10 11 14   CREATININE 0.70 0.87 0.69   CALCIUM 8.1* 8.7 8.1*                   Imaging  CT guided chest tube 5/19/2025    Assessment/Plan  * Atrial fibrillation with RVR (HCC)- (present on admission)  Assessment & Plan  Resolve of RVR  Continue metoprolol    Hypertension  Assessment & Plan  Metoprolol.  Hold outpatientHCTZrestart losartan and amlodipine as BP dictate/allows  Vitals:    05/17/25 1232   BP: 126/85   Pulse: (!) 108   Resp: 20   Temp: 36.8 °C (98.2 °F)   SpO2: 92%     Stable.    Acute hypoxic respiratory failure (HCC)-  (present on admission)  Assessment & Plan  Concern of recent URI and probable post obstructive pneumonia.  Thoracentesis with 500cc removed  Follow up on pleural effusion evaluation  Titrate oxygen  Mobilize  Antitussives as need    Mass of lower lobe of left lung- (present on admission)  Assessment & Plan  Mass vs consolidation  Follow up on pleural effusion cytology  May need future follow up CT and or biopsy.  Treating as infection at this time.    Pulmonology following    High anion gap metabolic acidosis- (present on admission)  Assessment & Plan  resolve    QUE (acute kidney injury) (HCC)- (present on admission)  Assessment & Plan  Suspected prerenal and hypovolemia  Improved with IV fluids and resolve of Afib rvr          Pleural effusion on right- (present on admission)  Assessment & Plan  Had a thoracentesis.  F/U post thoracentesis xray still with opacification of right lower lung  Pulmonology following, continue intrapleural lytic therapy, completed 3 days tx  Effusion improving  CT chest shows small residual effusion; loculated per pulmonology review  Plan for surgical decortication tentatively Tuesday    Sepsis (HCC)- (present on admission)  Assessment & Plan  S/p pressors and has had IV fluid resuscitation  Monitor I/O's, labs, and vitals.    Follow pleural fluid cultures         VTE prophylaxis: VTE Selection    I have performed a physical exam and reviewed and updated ROS and Plan today (5/25/2025). In review of yesterday's note (5/24/2025), there are no changes except as documented above.

## 2025-05-25 NOTE — PROGRESS NOTES
"Radiology Progress Note   Author: BENJAMIN Garcia Date & Time created: 5/25/2025  4:56 PM   Date of admission  5/14/2025  Note to reader: this note follows the APSO format rather than the historical SOAP format. Assessment and plan located at the top of the note for ease of use.    Chief Complaint  60 y.o. male admitted 5/14/2025 with   Chief Complaint   Patient presents with    Flank Pain     Right sided flank pain x 5 days, pt denies dysuria but reports pain is darker than normal. +nausea.        HPI  Patient \"is a 60 y.o. male who presented 5/14/2025 with shortness of breath and right sided flank pain x 5 days.  He has a hx of HTN, DLD.  He states he feels his flank pain was from a recent upper respiratory infection he was battling for past 2 weeks with ongoing fever, chills, and coughing. A CT chest showed a loculated right pleural effusion.  Initially he required pressor support. He was in atrial fib with RVR and hypoxic. An initial thoracentesis was unsuccessful. He had a thoracentesis with 500cc removed with improvement of his breathing.\" - MD Chris. IR was consulted for Right chest tube placement r/t loculation of right pleural effusion. IR Dr. Gallardo performed Right chest tube placement on 5/19/25.    Interval History:   05/20/25 - IR Right posterior 10F Chest tube to Pleur-Evac with 590 mL serosanguineous output in the last 24 hours. Chest tube to - 20 cm H2O suction; no leaks appreciated. Labs reviewed; WBC 6.8, H/H 11/33.3, Cr 0.71. Cultures pending. CXR this morning shows, \"Interval placement of small caliber RIGHT basilar chest tube; No pneumothorax; & Persistent moderate RIGHT pleural fluid collection with associated consolidation.\" SpO2 95% on room air. I discussed anticipated plan of care with patient. I discussed patient with hospital nursing staff and I reviewed IDT notes. Anticipate possible lytic treatment by pulm.    05/21/25 - IR Right posterior 10F Chest tube to Pleur-Evac with " "160 mL serosanguineous output in the last 24 hours. Chest tube clamped s/p lytic treatment by Pulm with patient laying with left side down; no leaks appreciated at time of assessment. Labs reviewed; WBC 6.5, H/H 11.4/34.4, Cr 0.77. Pleural fluid cultures (5/19) pending. CXR this morning delayed pending completion of lytic treatment. SpO2 94% on 1 Lpm Oxygen via NC. I discussed anticipated plan of care with patient. I reviewed IDT notes.    05/22/25 - Patient ambulating to restroom without difficulty. IR Right posterior 10F Chest tube to Pleur-Evac with 560 mL serosanguineous output in the last 24 hours. Chest tube to - 20 cm H2O suction; no leaks appreciated. Labs reviewed; WBC 7.2, H/H 11/34.1, Cr 0.73. Pleural fluid cultures (5/19) pending - NGTD. CXR from yesterday showed: \"Right pleural effusion appears slightly decreased with right pigtail chest tube in place. No pneumothorax.\" CXR today shows: \"Stable small right pleural effusion and associated atelectasis. No new consolidation. Right pigtail catheter tip projects over the right midlung, stable since prior. No pneumothorax.\" SpO2 90% on room air. I discussed anticipated plan of care with patient. I reviewed IDT notes.    05/23/25 - IR Right posterior 10F Chest tube to Pleur-Evac with 240 mL serosanguineous output in the last 24 hours. Chest tube to - 20 cm H2O suction; no leaks appreciated. Labs reviewed by me, including: WBC 8, H/H 11.3/34.3, Cr 0.7. Pleural fluid cultures (5/19) pending - NGTD. CXR today shows: \"Trace right pleural effusion, stable\" SpO2 92% on room air. I discussed anticipated plan of care with patient. I reviewed IDT notes.    05/24/25 - IR Right posterior 10F Chest tube to Pleur-Evac with 60 mL serosanguineous output in the last 24 hours. Chest tube to - 20 cm H2O suction; no leaks appreciated. Labs reviewed by me, including: WBC 8.2, H/H 12.7/39.7, Cr 0.87. Pleural fluid cultures (5/19) negative. CXR today shows: \"Right pleural pigtail " "drain remains. No pneumothorax visible. Mild right lower lung nodular opacity remains. Possible effusion.\" SpO2 92% on room air. I discussed anticipated plan of care with patient, Hospitalist, and Pulmonary. I reviewed IDT notes. CT today per Pulm, anticipate chest tube removal likely tomorrow.    05/25/25 - IR Right posterior 10F Chest tube to Pleur-Evac with 60 mL serosanguineous output in the last 24 hours. Chest tube to - 20 cm H2O suction; no leaks appreciated. Labs reviewed by me, including: WBC 8.6, H/H 11.2/34.9, Cr 0.69. Pleural fluid cultures (5/19) negative. CT shows: \"Small right pleural effusion remains, with small amount of air in the right posterior pleural space and associated atelectasis. Right pigtail chest tube is formed in the superior aspect of the effusion, in the right posterolateral pleural space.\" CXR today shows: \"Right pleural pigtail drain remains. No pneumothorax. Improving mild right base atelectasis and trace pleural fluid/thickening.\" SpO2 94% on room air. I discussed anticipated plan of care with patient and Pulmonary. I reviewed IDT notes. Anticipate decortication by CT surgery on Tuesday.    Assessment/Plan     Principal Problem:    Atrial fibrillation with RVR (HCC)  Active Problems:    Sepsis (HCC)    Pleural effusion on right    QUE (acute kidney injury) (HCC)    High anion gap metabolic acidosis    Mass of lower lobe of left lung    Acute hypoxic respiratory failure (HCC)    Hypertension    Shock (HCC)    Plan IR  - Anticipate decortication by CT surgery on Tuesday.  - Continue Right posterior chest tube to suction -20 cm H2O; defer to pulm during/after lytic treatments if/when applicable.  - Thoracentesis fluid cultures (5/16) negative.  - Pleural fluid cultures (5/19) negative.  - Serial CXR  - Hospitalist and Pulmonary following.  - lytic therapy #1 by pulm: 5/21  - lytic therapy #2 by pulm: 5/22  - lytic therapy #3 by pulm: 5/23  - Recommend fu CT in 5-7 days or when output " decreases to approx 100 mL out/24 hours  - Continue to monitor output, labs, and VS.  -  -Thank you for allowing Interventional Radiology team to participate in the patients care, if any additional care or requests are needed in the future please do not hesitate to call or place IR order.        Review of Systems  Physical Exam   Review of Systems   Constitutional:  Negative for chills, fever and malaise/fatigue.   Respiratory:  Negative for cough and shortness of breath.    Cardiovascular:  Negative for chest pain (minor discomfort at chest tube insertion site).   Gastrointestinal:  Negative for abdominal pain, nausea and vomiting.      Vitals:    05/25/25 1506   BP: 102/73   Pulse: 86   Resp: 20   Temp: 36.9 °C (98.5 °F)   SpO2: 94%     Physical Exam  Vitals and nursing note reviewed.   Constitutional:       General: He is not in acute distress.  Cardiovascular:      Rate and Rhythm: Normal rate.   Pulmonary:      Effort: Pulmonary effort is normal. No respiratory distress.      Comments: IR Right Posterior 10F Chest tube in place.  Abdominal:      Palpations: Abdomen is soft.      Tenderness: There is no abdominal tenderness.   Skin:     General: Skin is warm and dry.   Neurological:      General: No focal deficit present.      Mental Status: He is alert and oriented to person, place, and time.   Psychiatric:         Mood and Affect: Mood normal.         Behavior: Behavior normal.          Labs    Recent Labs     05/23/25  0254 05/24/25  0434 05/25/25  0117   WBC 8.0 8.2 8.6   RBC 3.83* 4.33* 3.87*   HEMOGLOBIN 11.3* 12.7* 11.2*   HEMATOCRIT 34.3* 39.7* 34.9*   MCV 89.6 91.7 90.2   MCH 29.5 29.3 28.9   MCHC 32.9 32.0* 32.1*   RDW 41.1 42.6 41.6   PLATELETCT 380 450* 394   MPV 8.6* 8.6* 8.7*     Recent Labs     05/23/25  0254 05/24/25  0434 05/25/25  0117   SODIUM 136 137 137   POTASSIUM 4.2 4.4 4.0   CHLORIDE 104 102 104   CO2 21 21 21   GLUCOSE 106* 102* 102*   BUN 10 11 14   CREATININE 0.70 0.87 0.69   CALCIUM  8.1* 8.7 8.1*     Recent Labs     05/23/25  0254 05/24/25  0434 05/25/25  0117   ALBUMIN 2.8* 3.1* 2.9*   TBILIRUBIN 0.3 0.3 0.3   ALKPHOSPHAT 156* 166* 157*   TOTPROTEIN 6.2 7.1 6.4   ALTSGPT 60* 64* 71*   ASTSGOT 38 40 42   CREATININE 0.70 0.87 0.69     DX-CHEST-LIMITED (1 VIEW)   Final Result         1. Improving mild right base atelectasis and trace pleural fluid/thickening.      2. Right pleural pigtail drain remains. No pneumothorax.                     CT-CHEST (THORAX) W/O   Final Result      1.  Small right pleural effusion remains, with small amount of air in the right posterior pleural space and associated atelectasis. Right pigtail chest tube is formed in the superior aspect of the effusion, in the right posterolateral pleural space.   2.  A 2.3 cm pleural-based nodular opacity in the left posterior costophrenic angle, possibly round atelectasis. Recommend follow-up with another chest CT in 2-3 months.   3.  Borderline enlarged mediastinal lymph nodes measuring up to 9 mm short axis, statistically reactive.         DX-CHEST-LIMITED (1 VIEW)   Final Result         1. No change.      2. Right pleural pigtail drain. No pneumothorax. Possible trace effusion.      3. Persistent mild right basilar opacity.                     DX-CHEST-LIMITED (1 VIEW)   Final Result         1.  Hazy right pulmonary infiltrates, stable   2.  Trace right pleural effusion, stable      DX-CHEST-LIMITED (1 VIEW)   Final Result      1.  Stable small right pleural effusion and associated atelectasis. No new consolidation.   2.  Right pigtail catheter tip projects over the right midlung, stable since prior. No pneumothorax.      DX-CHEST-2 VIEWS   Final Result      Right pleural effusion appears slightly decreased with right pigtail chest tube in place. No pneumothorax.      DX-CHEST-2 VIEWS   Final Result      1.  Interval placement of small caliber RIGHT basilar chest tube.   2.  No pneumothorax.   3.  Persistent moderate RIGHT  pleural fluid collection with associated consolidation.      CT-CHEST TUBE PLACEMENT (EMPYEMA AND PNEUMO)   Final Result      Successful CT GUIDED right  CHEST TUBE PLACEMENT.      CT-CHEST (THORAX) W/O   Final Result      1.  Loculated right pleural effusion has increased from the prior CT. Adjacent airspace opacification may represent atelectasis or pneumonia.   2.  Small left pleural effusion with adjacent compressive atelectasis.   3.  Mild mediastinal adenopathy is likely reactive.         DX-CHEST-PORTABLE (1 VIEW)   Final Result      1.  Persistent right pleural effusion and associated atelectasis/consolidation.   2.  No pneumothorax status post right-sided thoracentesis.      US-THORACENTESIS PUNCTURE RIGHT   Final Result      1. Ultrasound guided right sided diagnostic and therapeutic thoracentesis.      2. 500 mL of fluid withdrawn.      DX-CHEST-PORTABLE (1 VIEW)   Final Result         1. Moderate right pleural effusion and atelectasis unchanged.                  EC-ECHOCARDIOGRAM COMPLETE W/O CONT   Final Result      DX-CHEST-PORTABLE (1 VIEW)   Final Result      1.  Hypoinflation.   2.  Moderate right pleural effusion and underlying atelectasis or consolidation. As previously mentioned, possible underlying pneumonia.   3.  Minimal left pleural effusion and subsegmental atelectatic change.      DX-CHEST-LIMITED (1 VIEW)   Final Result      Airspace opacity in the right lower lobe, concerning for pneumonia.      CT-CTA COMPLETE THORACOABDOMINAL AORTA   Final Result      1.  No evidence for aortic aneurysm or dissection.   2.  Loculated right pleural effusion with associated compressive atelectasis and consolidation.   3.  Masslike area of consolidation in the left posterior medial costophrenic angle. This may be due to infection or neoplasm. Clinical correlation and follow-up is needed.   4.  Hepatomegaly with fatty infiltration of the liver.                          INR   Date Value Ref Range Status  "  05/14/2025 1.15 (H) 0.87 - 1.13 Final     Comment:     INR - Non-therapeutic Reference Range: 0.87-1.13  INR - Therapeutic Reference Range: 2.0-4.0       No results found for: \"POCINR\"     Intake/Output Summary (Last 24 hours) at 5/20/2025 0909  Last data filed at 5/20/2025 0645  Gross per 24 hour   Intake --   Output 590 ml   Net -590 ml      I have personally reviewed the above labs and imaging      I have performed a physical exam and reviewed and updated ROS and Plan today (5/25/2025).     46 minutes in directly providing and coordinating care and extensive data review.  No time overlap and excludes procedures.   "

## 2025-05-25 NOTE — PROGRESS NOTES
Critical Care Progress Note    Date of admission  5/14/2025    Chief Complaint/Hospital Course  60M admitted with sepsis 2/2 pneumonia. He was noted to have R loculated pleural effusion w/unsuccessful initial thoracentesis but subsequent one with removal of 500cc fluid on 5/16 - glucose 12 and LDH 1803. Patient's XR and subsequent CT with worsening effusion so chest tube placed 5/19 by IR - drainage of dark serous fluid.   5/22: about another 600cc ouput after tpa/dornase yesterday. Pleural effusion looks improved on CXR.  5/23: chest tube output 240cc. CXR improved but still with a pleural effusion. Last dose of intrapleural fibrinolytics  5/24: chest tube output 60cc. Effusion improved on CXR. Feels much better.       Review of Systems  Review of Systems   Constitutional:  Positive for malaise/fatigue. Negative for chills and fever.   HENT:  Negative for congestion.    Eyes:  Negative for redness.   Respiratory:  Negative for cough and shortness of breath.    Cardiovascular:  Negative for chest pain.   Gastrointestinal:  Negative for nausea and vomiting.   Musculoskeletal:  Negative for falls.   Neurological:  Negative for focal weakness.        Vital Signs for last 24 hours   Temp:  [36.6 °C (97.8 °F)-37.2 °C (98.9 °F)] 37.2 °C (98.9 °F)  Pulse:  [76-88] 88  Resp:  [16-18] 17  BP: (102-131)/(61-83) 131/83  SpO2:  [89 %-95 %] 93 %    Hemodynamic parameters for last 24 hours       Respiratory Information for the last 24 hours       Physical Exam   Physical Exam    Medications  Current Medications[1]    Fluids    Intake/Output Summary (Last 24 hours) at 5/24/2025 2023  Last data filed at 5/24/2025 1921  Gross per 24 hour   Intake --   Output 1435 ml   Net -1435 ml       Laboratory          Recent Labs     05/22/25  0224 05/23/25  0254 05/24/25  0434   SODIUM 138 136 137   POTASSIUM 4.2 4.2 4.4   CHLORIDE 104 104 102   CO2 23 21 21   BUN 12 10 11   CREATININE 0.73 0.70 0.87   MAGNESIUM 2.1 2.0 2.2   CALCIUM 8.2* 8.1*  8.7     Recent Labs     05/22/25  0224 05/23/25  0254 05/24/25  0434   ALTSGPT 49 60* 64*   ASTSGOT 34 38 40   ALKPHOSPHAT 162* 156* 166*   TBILIRUBIN 0.3 0.3 0.3   GLUCOSE 95 106* 102*     Recent Labs     05/22/25  0224 05/23/25  0254 05/24/25  0434   WBC 7.2 8.0 8.2   NEUTSPOLYS 70.20 76.40* 73.60*   LYMPHOCYTES 17.80* 12.50* 15.70*   MONOCYTES 8.40 8.20 7.60   EOSINOPHILS 2.50 2.00 2.00   BASOPHILS 0.40 0.40 0.50   ASTSGOT 34 38 40   ALTSGPT 49 60* 64*   ALKPHOSPHAT 162* 156* 166*   TBILIRUBIN 0.3 0.3 0.3     Recent Labs     05/22/25 0224 05/23/25  0254 05/24/25  0434   RBC 3.70* 3.83* 4.33*   HEMOGLOBIN 11.0* 11.3* 12.7*   HEMATOCRIT 34.1* 34.3* 39.7*   PLATELETCT 386 380 450*       Imaging  Reviewed     Assessment/Plan    Loculated pleural effusion  Thora w/removal of 500cc fluid on 5/16 - glucose 12 and LDH 1803. Patient's XR and subsequent CT with worsening effusion so chest tube placed 5/19 by IR - drainage of dark serous fluid.   - continue unasyn - will need prolonged course of abx w/this complicated parapneumonic effusion   - f/u cultures  - monitor chest tube output   - intrapleural fibrinolytics 5/21 - 5/23  - output 60cc over last 24 hrs - will get CT chest and if minimal effusion, can remove chest tube and finish a 3-4 week course of abx.   - continue chest tube to suction - ok to clamp for ambulation   - aggressive pulmonary hygiene with OOB, early mobility, IS use        Abby Walden MD  Pulmonary and Critical Care Medicine  [facility]         [1]   Current Facility-Administered Medications   Medication Dose Route Frequency Provider Last Rate Last Admin    acetaminophen (Tylenol) tablet 1,000 mg  1,000 mg Oral Q6HRS PRN FERNANDO WestbrookP.R.N.   1,000 mg at 05/24/25 1901    guaiFENesin dextromethorphan (Robitussin DM) 100-10 MG/5ML syrup 5 mL  5 mL Oral Q6HRS Tre Bloom M.D.   5 mL at 05/24/25 1703    diclofenac sodium (Voltaren) 1 % gel 2 g  2 g Topical 4X/DAY PRN Tre LANDAVERDE  SALONI Bloom        ampicillin/sulbactam (Unasyn) 3 g in  mL IVPB  3 g Intravenous Q6HRS Tre Bloom M.D. 200 mL/hr at 05/24/25 1711 3 g at 05/24/25 1711    NS infusion   Intravenous Continuous Johnnie Huston M.D. 30 mL/hr at 05/19/25 0011 New Bag at 05/19/25 0011    diazePAM (Valium) tablet 5 mg  5 mg Oral Q8HRS PRN Johnnie Huston M.D.   5 mg at 05/18/25 0946    metoprolol tartrate (Lopressor) tablet 25 mg  25 mg Oral BID Johnnie Huston M.D.   25 mg at 05/24/25 1703    [Held by provider] enoxaparin (Lovenox) inj 40 mg  40 mg Subcutaneous DAILY AT 1800 Nigel Terry M.D.   40 mg at 05/17/25 1732    senna-docusate (Pericolace Or Senokot S) 8.6-50 MG per tablet 2 Tablet  2 Tablet Oral Q EVENING Radha Art M.D.   2 Tablet at 05/20/25 1712    And    polyethylene glycol/lytes (Miralax) Packet 1 Packet  1 Packet Oral QDAY PRN Radha Art M.D.        ondansetron (Zofran) syringe/vial injection 4 mg  4 mg Intravenous Q4HRS PRN Radha Art M.D.        ondansetron (Zofran ODT) dispertab 4 mg  4 mg Oral Q4HRS PRBHUMIKA Art M.D.        promethazine (Phenergan) tablet 12.5-25 mg  12.5-25 mg Oral Q4HRS PRBHUMIKA Art M.D.        oxyCODONE immediate-release (Roxicodone) tablet 2.5 mg  2.5 mg Oral Q3HRS STUART Art M.D.   2.5 mg at 05/22/25 2037    Or    oxyCODONE immediate-release (Roxicodone) tablet 5 mg  5 mg Oral Q3HRS PRN Radha Art M.D.   5 mg at 05/23/25 2317    Or    HYDROmorphone (Dilaudid) injection 0.25 mg  0.25 mg Intravenous Q3HRS PRN Radha Art M.D.

## 2025-05-25 NOTE — PROGRESS NOTES
Critical Care Progress Note    Date of admission  5/14/2025    Chief Complaint/Hospital Course  60M admitted with sepsis 2/2 pneumonia. He was noted to have R loculated pleural effusion w/unsuccessful initial thoracentesis but subsequent one with removal of 500cc fluid on 5/16 - glucose 12 and LDH 1803. Patient's XR and subsequent CT with worsening effusion so chest tube placed 5/19 by IR - drainage of dark serous fluid.   5/22: about another 600cc ouput after tpa/dornase yesterday. Pleural effusion looks improved on CXR.  5/23: chest tube output 240cc. CXR improved but still with a pleural effusion. Last dose of intrapleural fibrinolytics  5/24: chest tube output 60cc. Effusion improved on CXR. Feels much better.   5/25: chest tube output 60cc. CT chest shows a pocket of loculated effusion with air pockets.     Review of Systems  Review of Systems   Constitutional:  Positive for malaise/fatigue. Negative for chills and fever.   HENT:  Negative for congestion.    Eyes:  Negative for redness.   Respiratory:  Negative for cough and shortness of breath.    Cardiovascular:  Negative for chest pain.   Gastrointestinal:  Negative for nausea and vomiting.   Musculoskeletal:  Negative for falls.   Neurological:  Negative for focal weakness.        Vital Signs for last 24 hours   Temp:  [36.7 °C (98 °F)-37.2 °C (98.9 °F)] 36.7 °C (98 °F)  Pulse:  [79-88] 79  Resp:  [14-18] 14  BP: (102-131)/(69-83) 112/80  SpO2:  [89 %-95 %] 95 %    Hemodynamic parameters for last 24 hours       Respiratory Information for the last 24 hours       Physical Exam   Physical Exam    Medications  Current Medications[1]    Fluids    Intake/Output Summary (Last 24 hours) at 5/25/2025 1307  Last data filed at 5/25/2025 0643  Gross per 24 hour   Intake 240 ml   Output 1510 ml   Net -1270 ml       Laboratory          Recent Labs     05/23/25  0254 05/24/25  0434 05/25/25  0117   SODIUM 136 137 137   POTASSIUM 4.2 4.4 4.0   CHLORIDE 104 102 104   CO2  21 21 21   BUN 10 11 14   CREATININE 0.70 0.87 0.69   MAGNESIUM 2.0 2.2 2.1   CALCIUM 8.1* 8.7 8.1*     Recent Labs     05/23/25  0254 05/24/25  0434 05/25/25  0117   ALTSGPT 60* 64* 71*   ASTSGOT 38 40 42   ALKPHOSPHAT 156* 166* 157*   TBILIRUBIN 0.3 0.3 0.3   GLUCOSE 106* 102* 102*     Recent Labs     05/23/25  0254 05/24/25  0434 05/25/25  0117   WBC 8.0 8.2 8.6   NEUTSPOLYS 76.40* 73.60* 75.60*   LYMPHOCYTES 12.50* 15.70* 13.40*   MONOCYTES 8.20 7.60 7.90   EOSINOPHILS 2.00 2.00 2.20   BASOPHILS 0.40 0.50 0.30   ASTSGOT 38 40 42   ALTSGPT 60* 64* 71*   ALKPHOSPHAT 156* 166* 157*   TBILIRUBIN 0.3 0.3 0.3     Recent Labs     05/23/25  0254 05/24/25  0434 05/25/25  0117   RBC 3.83* 4.33* 3.87*   HEMOGLOBIN 11.3* 12.7* 11.2*   HEMATOCRIT 34.3* 39.7* 34.9*   PLATELETCT 380 450* 394       Imaging  Reviewed     Assessment/Plan    Loculated pleural effusion  Thora w/removal of 500cc fluid on 5/16 - glucose 12 and LDH 1803. Patient's XR and subsequent CT with worsening effusion so chest tube placed 5/19 by IR - drainage of dark serous fluid.     - repeat CT chest 5/24 shows a pocket of loculated effusion with air - discussed case with Dr. Proctor who agrees with proceeding with surgical decortication - he will tentatively plan for Tuesday - discussed with patient and he is agreeable with plan  - npo on Monday night  - hold Monday lovenox dose  - continue unasyn - will need prolonged course of abx w/this complicated parapneumonic effusion   - f/u cultures  - monitor chest tube output   - intrapleural fibrinolytics 5/21 - 5/23  - continue chest tube to suction - ok to clamp for ambulation   - aggressive pulmonary hygiene with OOB, early mobility, IS use        Abby Walden MD  Pulmonary and Critical Care Medicine  [facility]         [1]   Current Facility-Administered Medications   Medication Dose Route Frequency Provider Last Rate Last Admin    acetaminophen (Tylenol) tablet 1,000 mg  1,000 mg Oral Q6HRS  PRN BENJAMIN Westbrook   1,000 mg at 05/25/25 0422    guaiFENesin dextromethorphan (Robitussin DM) 100-10 MG/5ML syrup 5 mL  5 mL Oral Q6HRS Tre Bloom M.D.   5 mL at 05/25/25 1121    diclofenac sodium (Voltaren) 1 % gel 2 g  2 g Topical 4X/DAY PRN Tre Bloom M.D.        ampicillin/sulbactam (Unasyn) 3 g in  mL IVPB  3 g Intravenous Q6HRS Tre Bloom M.D.   Stopped at 05/25/25 1200    NS infusion   Intravenous Continuous Johnnie Huston M.D. 30 mL/hr at 05/19/25 0011 New Bag at 05/19/25 0011    diazePAM (Valium) tablet 5 mg  5 mg Oral Q8HRS PRN Johnnie Huston M.D.   5 mg at 05/18/25 0946    metoprolol tartrate (Lopressor) tablet 25 mg  25 mg Oral BID Johnnie Huston M.D.   25 mg at 05/25/25 0532    [Held by provider] enoxaparin (Lovenox) inj 40 mg  40 mg Subcutaneous DAILY AT 1800 Nigel Terry M.D.   40 mg at 05/17/25 1732    senna-docusate (Pericolace Or Senokot S) 8.6-50 MG per tablet 2 Tablet  2 Tablet Oral Q EVENING Radha Art M.D.   2 Tablet at 05/20/25 1712    And    polyethylene glycol/lytes (Miralax) Packet 1 Packet  1 Packet Oral QDAY PRN Radha Art M.D.        ondansetron (Zofran) syringe/vial injection 4 mg  4 mg Intravenous Q4HRS PRN Radha Art M.D.        ondansetron (Zofran ODT) dispertab 4 mg  4 mg Oral Q4HRS PRN Radha Art M.D.        promethazine (Phenergan) tablet 12.5-25 mg  12.5-25 mg Oral Q4HRS PRN Radha Art M.D.        oxyCODONE immediate-release (Roxicodone) tablet 2.5 mg  2.5 mg Oral Q3HRS STUART Art M.D.   2.5 mg at 05/22/25 2037    Or    oxyCODONE immediate-release (Roxicodone) tablet 5 mg  5 mg Oral Q3HRS STUART Art M.D.   5 mg at 05/23/25 2317    Or    HYDROmorphone (Dilaudid) injection 0.25 mg  0.25 mg Intravenous Q3HRS STUART Art M.D.

## 2025-05-26 ENCOUNTER — APPOINTMENT (OUTPATIENT)
Dept: RADIOLOGY | Facility: MEDICAL CENTER | Age: 60
DRG: 871 | End: 2025-05-26
Attending: STUDENT IN AN ORGANIZED HEALTH CARE EDUCATION/TRAINING PROGRAM
Payer: COMMERCIAL

## 2025-05-26 LAB
ALBUMIN SERPL BCP-MCNC: 3.1 G/DL (ref 3.2–4.9)
ALBUMIN/GLOB SERPL: 0.8 G/DL
ALP SERPL-CCNC: 161 U/L (ref 30–99)
ALT SERPL-CCNC: 74 U/L (ref 2–50)
ANION GAP SERPL CALC-SCNC: 10 MMOL/L (ref 7–16)
AST SERPL-CCNC: 37 U/L (ref 12–45)
BASOPHILS # BLD AUTO: 0.4 % (ref 0–1.8)
BASOPHILS # BLD: 0.03 K/UL (ref 0–0.12)
BILIRUB SERPL-MCNC: 0.3 MG/DL (ref 0.1–1.5)
BUN SERPL-MCNC: 14 MG/DL (ref 8–22)
CALCIUM ALBUM COR SERPL-MCNC: 9.2 MG/DL (ref 8.5–10.5)
CALCIUM SERPL-MCNC: 8.5 MG/DL (ref 8.5–10.5)
CHLORIDE SERPL-SCNC: 105 MMOL/L (ref 96–112)
CO2 SERPL-SCNC: 22 MMOL/L (ref 20–33)
CREAT SERPL-MCNC: 0.79 MG/DL (ref 0.5–1.4)
EOSINOPHIL # BLD AUTO: 0.17 K/UL (ref 0–0.51)
EOSINOPHIL NFR BLD: 2.2 % (ref 0–6.9)
ERYTHROCYTE [DISTWIDTH] IN BLOOD BY AUTOMATED COUNT: 41 FL (ref 35.9–50)
GFR SERPLBLD CREATININE-BSD FMLA CKD-EPI: 101 ML/MIN/1.73 M 2
GLOBULIN SER CALC-MCNC: 3.8 G/DL (ref 1.9–3.5)
GLUCOSE SERPL-MCNC: 100 MG/DL (ref 65–99)
HCT VFR BLD AUTO: 36 % (ref 42–52)
HGB BLD-MCNC: 12 G/DL (ref 14–18)
IMM GRANULOCYTES # BLD AUTO: 0.07 K/UL (ref 0–0.11)
IMM GRANULOCYTES NFR BLD AUTO: 0.9 % (ref 0–0.9)
LYMPHOCYTES # BLD AUTO: 1.1 K/UL (ref 1–4.8)
LYMPHOCYTES NFR BLD: 14.1 % (ref 22–41)
MAGNESIUM SERPL-MCNC: 2.2 MG/DL (ref 1.5–2.5)
MCH RBC QN AUTO: 29.6 PG (ref 27–33)
MCHC RBC AUTO-ENTMCNC: 33.3 G/DL (ref 32.3–36.5)
MCV RBC AUTO: 88.7 FL (ref 81.4–97.8)
MONOCYTES # BLD AUTO: 0.57 K/UL (ref 0–0.85)
MONOCYTES NFR BLD AUTO: 7.3 % (ref 0–13.4)
NEUTROPHILS # BLD AUTO: 5.87 K/UL (ref 1.82–7.42)
NEUTROPHILS NFR BLD: 75.1 % (ref 44–72)
NRBC # BLD AUTO: 0 K/UL
NRBC BLD-RTO: 0 /100 WBC (ref 0–0.2)
PLATELET # BLD AUTO: 439 K/UL (ref 164–446)
PMV BLD AUTO: 8.5 FL (ref 9–12.9)
POTASSIUM SERPL-SCNC: 4.3 MMOL/L (ref 3.6–5.5)
PROT SERPL-MCNC: 6.9 G/DL (ref 6–8.2)
RBC # BLD AUTO: 4.06 M/UL (ref 4.7–6.1)
SODIUM SERPL-SCNC: 137 MMOL/L (ref 135–145)
WBC # BLD AUTO: 7.8 K/UL (ref 4.8–10.8)

## 2025-05-26 PROCEDURE — 85025 COMPLETE CBC W/AUTO DIFF WBC: CPT

## 2025-05-26 PROCEDURE — 700102 HCHG RX REV CODE 250 W/ 637 OVERRIDE(OP): Performed by: INTERNAL MEDICINE

## 2025-05-26 PROCEDURE — 36415 COLL VENOUS BLD VENIPUNCTURE: CPT

## 2025-05-26 PROCEDURE — 700111 HCHG RX REV CODE 636 W/ 250 OVERRIDE (IP): Mod: JZ | Performed by: STUDENT IN AN ORGANIZED HEALTH CARE EDUCATION/TRAINING PROGRAM

## 2025-05-26 PROCEDURE — 71045 X-RAY EXAM CHEST 1 VIEW: CPT

## 2025-05-26 PROCEDURE — 80053 COMPREHEN METABOLIC PANEL: CPT

## 2025-05-26 PROCEDURE — A9270 NON-COVERED ITEM OR SERVICE: HCPCS | Performed by: NURSE PRACTITIONER

## 2025-05-26 PROCEDURE — 700102 HCHG RX REV CODE 250 W/ 637 OVERRIDE(OP): Performed by: NURSE PRACTITIONER

## 2025-05-26 PROCEDURE — 700105 HCHG RX REV CODE 258: Performed by: STUDENT IN AN ORGANIZED HEALTH CARE EDUCATION/TRAINING PROGRAM

## 2025-05-26 PROCEDURE — A9270 NON-COVERED ITEM OR SERVICE: HCPCS | Performed by: INTERNAL MEDICINE

## 2025-05-26 PROCEDURE — 770006 HCHG ROOM/CARE - MED/SURG/GYN SEMI*

## 2025-05-26 PROCEDURE — 83735 ASSAY OF MAGNESIUM: CPT

## 2025-05-26 PROCEDURE — 700102 HCHG RX REV CODE 250 W/ 637 OVERRIDE(OP): Performed by: STUDENT IN AN ORGANIZED HEALTH CARE EDUCATION/TRAINING PROGRAM

## 2025-05-26 PROCEDURE — A9270 NON-COVERED ITEM OR SERVICE: HCPCS | Performed by: STUDENT IN AN ORGANIZED HEALTH CARE EDUCATION/TRAINING PROGRAM

## 2025-05-26 PROCEDURE — 99232 SBSQ HOSP IP/OBS MODERATE 35: CPT | Performed by: STUDENT IN AN ORGANIZED HEALTH CARE EDUCATION/TRAINING PROGRAM

## 2025-05-26 RX ORDER — ACETAMINOPHEN 500 MG
1000 TABLET ORAL EVERY 6 HOURS PRN
Status: DISCONTINUED | OUTPATIENT
Start: 2025-05-26 | End: 2025-05-27 | Stop reason: HOSPADM

## 2025-05-26 RX ADMIN — METOPROLOL TARTRATE 25 MG: 25 TABLET, FILM COATED ORAL at 05:09

## 2025-05-26 RX ADMIN — ACETAMINOPHEN 1000 MG: 500 TABLET ORAL at 22:13

## 2025-05-26 RX ADMIN — AMPICILLIN AND SULBACTAM 3 G: 1; 2 INJECTION, POWDER, FOR SOLUTION INTRAMUSCULAR; INTRAVENOUS at 19:58

## 2025-05-26 RX ADMIN — AMPICILLIN AND SULBACTAM 3 G: 1; 2 INJECTION, POWDER, FOR SOLUTION INTRAMUSCULAR; INTRAVENOUS at 01:56

## 2025-05-26 RX ADMIN — GUAIFENESIN SYRUP AND DEXTROMETHORPHAN 5 ML: 100; 10 SYRUP ORAL at 17:27

## 2025-05-26 RX ADMIN — GUAIFENESIN SYRUP AND DEXTROMETHORPHAN 5 ML: 100; 10 SYRUP ORAL at 05:09

## 2025-05-26 RX ADMIN — AMPICILLIN AND SULBACTAM 3 G: 1; 2 INJECTION, POWDER, FOR SOLUTION INTRAMUSCULAR; INTRAVENOUS at 13:41

## 2025-05-26 RX ADMIN — AMPICILLIN AND SULBACTAM 3 G: 1; 2 INJECTION, POWDER, FOR SOLUTION INTRAMUSCULAR; INTRAVENOUS at 07:51

## 2025-05-26 RX ADMIN — ACETAMINOPHEN 1000 MG: 500 TABLET ORAL at 07:55

## 2025-05-26 RX ADMIN — METOPROLOL TARTRATE 25 MG: 25 TABLET, FILM COATED ORAL at 17:27

## 2025-05-26 RX ADMIN — GUAIFENESIN SYRUP AND DEXTROMETHORPHAN 5 ML: 100; 10 SYRUP ORAL at 12:31

## 2025-05-26 ASSESSMENT — PAIN DESCRIPTION - PAIN TYPE
TYPE: ACUTE PAIN
TYPE: SURGICAL PAIN
TYPE: SURGICAL PAIN

## 2025-05-26 ASSESSMENT — ENCOUNTER SYMPTOMS
NAUSEA: 0
DIZZINESS: 0
VOMITING: 0
ABDOMINAL PAIN: 0
CHILLS: 0
SHORTNESS OF BREATH: 0
FEVER: 0
CONSTITUTIONAL NEGATIVE: 1
COUGH: 1

## 2025-05-26 ASSESSMENT — PATIENT HEALTH QUESTIONNAIRE - PHQ9
SUM OF ALL RESPONSES TO PHQ9 QUESTIONS 1 AND 2: 0
1. LITTLE INTEREST OR PLEASURE IN DOING THINGS: NOT AT ALL
2. FEELING DOWN, DEPRESSED, IRRITABLE, OR HOPELESS: NOT AT ALL

## 2025-05-26 NOTE — PROGRESS NOTES
Hospital Medicine Daily Progress Note    Date of Service  5/26/2025    Chief Complaint    Hospital Course  Marlo Gusman is a 60 y.o. male who presented 5/14/2025 with shortness of breath and right sided flank pain x 5 days.  He has a hx of HTN, DLD.  He states he feels his flank pain was from a recent upper respiratory infection he was battling for past 2 weeks with ongoing fever, chills, and coughing.      A CT chest showed a loculated right pleural effusion.  Initially he required pressor support. He was in atrial fib with RVR and hypoxic. An initial thoracentesis was unsuccessfulHe had a thoracentesis with 500cc removed with improvement of his breathing. Due to worsening effusion on CXR, chest tube placed. Chest tube with good output, patient received 3 days of intralpleural lytic medication. Follow up CT chest showing small residual loculated effusion. Thoracic surgery consulted who recommend surgical decortication.    Interval Problem Update  No acute events overnight.  Patient feels well, on room air.  Chest tube in place.  CXR shows small pneumothorax right side.  NPO at midnight, plan for surgical decortication tomorrow.  Holding any anticoagulation, SCDs ordered.  Continue antibiotics for pneumonia, parapneumonic effusion, anticipate prolonged antibiotic course.  Appreciate pulmonology and IR plan of care.        I have discussed this patient's plan of care and discharge plan at IDT rounds today with Case Management, Nursing, Nursing leadership, and other members of the IDT team.    Consultants/Specialty  Pulmonology, interventional radiology, critical care      Code Status  Full Code    Disposition  Medically Cleared  I have placed the appropriate orders for post-discharge needs.    Review of Systems  Review of Systems   Constitutional: Negative.  Negative for chills and fever.   Cardiovascular:  Positive for chest pain.   Gastrointestinal:  Negative for nausea and vomiting.   Neurological:  Negative for  dizziness.        Physical Exam  Temp:  [36.6 °C (97.8 °F)-37.1 °C (98.8 °F)] 36.8 °C (98.3 °F)  Pulse:  [71-87] 80  Resp:  [17-20] 17  BP: (102-113)/(71-78) 108/75  SpO2:  [92 %-94 %] 92 %    Physical Exam  Vitals and nursing note reviewed.   Constitutional:       Appearance: Normal appearance.   HENT:      Head: Normocephalic and atraumatic.   Eyes:      Extraocular Movements: Extraocular movements intact.   Cardiovascular:      Rate and Rhythm: Normal rate.      Heart sounds: Normal heart sounds. No murmur heard.  Pulmonary:      Effort: Pulmonary effort is normal. No respiratory distress.      Breath sounds: Normal breath sounds. No wheezing.   Abdominal:      General: Abdomen is flat. Bowel sounds are normal. There is no distension.   Musculoskeletal:         General: Normal range of motion.   Skin:     General: Skin is warm and dry.   Neurological:      General: No focal deficit present.      Mental Status: He is alert. Mental status is at baseline.   Psychiatric:         Mood and Affect: Mood normal.         Thought Content: Thought content normal.         Fluids    Intake/Output Summary (Last 24 hours) at 5/26/2025 1343  Last data filed at 5/26/2025 0554  Gross per 24 hour   Intake --   Output 650 ml   Net -650 ml        Laboratory  Recent Labs     05/24/25  0434 05/25/25  0117 05/26/25  0343   WBC 8.2 8.6 7.8   RBC 4.33* 3.87* 4.06*   HEMOGLOBIN 12.7* 11.2* 12.0*   HEMATOCRIT 39.7* 34.9* 36.0*   MCV 91.7 90.2 88.7   MCH 29.3 28.9 29.6   MCHC 32.0* 32.1* 33.3   RDW 42.6 41.6 41.0   PLATELETCT 450* 394 439   MPV 8.6* 8.7* 8.5*     Recent Labs     05/24/25  0434 05/25/25  0117 05/26/25  0343   SODIUM 137 137 137   POTASSIUM 4.4 4.0 4.3   CHLORIDE 102 104 105   CO2 21 21 22   GLUCOSE 102* 102* 100*   BUN 11 14 14   CREATININE 0.87 0.69 0.79   CALCIUM 8.7 8.1* 8.5                   Imaging  CT guided chest tube 5/19/2025    Assessment/Plan  * Atrial fibrillation with RVR (HCC)- (present on admission)  Assessment  & Plan  Resolve of RVR  Continue metoprolol  Chadsvasc score 1    Hypertension  Assessment & Plan  Metoprolol.  Hold outpatientHCTZrestart losartan and amlodipine as BP dictate/allows  Vitals:    05/17/25 1232   BP: 126/85   Pulse: (!) 108   Resp: 20   Temp: 36.8 °C (98.2 °F)   SpO2: 92%     Stable.    Acute hypoxic respiratory failure (HCC)- (present on admission)  Assessment & Plan  Concern of recent URI and probable post obstructive pneumonia.  Thoracentesis with 500cc removed  Follow up on pleural effusion evaluation  Titrate oxygen  Mobilize  Antitussives as need    Mass of lower lobe of left lung- (present on admission)  Assessment & Plan  Mass vs consolidation  Follow up on pleural effusion cytology  May need future follow up CT and or biopsy.  Treating as infection at this time.    Pulmonology following    High anion gap metabolic acidosis- (present on admission)  Assessment & Plan  resolve    QUE (acute kidney injury) (HCC)- (present on admission)  Assessment & Plan  Suspected prerenal and hypovolemia  Improved with IV fluids and resolve of Afib rvr          Pleural effusion on right- (present on admission)  Assessment & Plan  Had a thoracentesis.  F/U post thoracentesis xray still with opacification of right lower lung  Pulmonology following, continue intrapleural lytic therapy, completed 3 days tx  Effusion improving  CT chest shows small residual effusion; loculated per pulmonology review  Plan for surgical decortication tentatively tomorrow 5/27, NPO at midnight    Sepsis (HCC)- (present on admission)  Assessment & Plan  S/p pressors and has had IV fluid resuscitation  Monitor I/O's, labs, and vitals.    Follow pleural fluid cultures         VTE prophylaxis: VTE Selection    I have performed a physical exam and reviewed and updated ROS and Plan today (5/26/2025). In review of yesterday's note (5/25/2025), there are no changes except as documented above.

## 2025-05-26 NOTE — CARE PLAN
The patient is Stable - Low risk of patient condition declining or worsening    Shift Goals  Clinical Goals: monitor pain, IV abx  Patient Goals: rest  Family Goals: not present    Progress made toward(s) clinical / shift goals:    Problem: Knowledge Deficit - Standard  Goal: Patient and family/care givers will demonstrate understanding of plan of care, disease process/condition, diagnostic tests and medications  Outcome: Progressing     Problem: Pain - Standard  Goal: Alleviation of pain or a reduction in pain to the patient’s comfort goal  Outcome: Progressing       Patient is not progressing towards the following goals:

## 2025-05-26 NOTE — PROGRESS NOTES
Assumed care of pt at 1900. Report received and bedside rounding completed with day RN. Pt is calm no SOB, no acute distress noted.    POC discussed with pt, questions answered. White board updated. Call light and pt belongings within reach - safety precautions and hourly rounding in place. See flowsheets for assessment.

## 2025-05-26 NOTE — PROGRESS NOTES
"Radiology Progress Note   Author: BENJAMIN Garcia Date & Time created: 5/26/2025  3:07 PM   Date of admission  5/14/2025  Note to reader: this note follows the APSO format rather than the historical SOAP format. Assessment and plan located at the top of the note for ease of use.    Chief Complaint  60 y.o. male admitted 5/14/2025 with   Chief Complaint   Patient presents with    Flank Pain     Right sided flank pain x 5 days, pt denies dysuria but reports pain is darker than normal. +nausea.        HPI  Patient \"is a 60 y.o. male who presented 5/14/2025 with shortness of breath and right sided flank pain x 5 days.  He has a hx of HTN, DLD.  He states he feels his flank pain was from a recent upper respiratory infection he was battling for past 2 weeks with ongoing fever, chills, and coughing. A CT chest showed a loculated right pleural effusion.  Initially he required pressor support. He was in atrial fib with RVR and hypoxic. An initial thoracentesis was unsuccessful. He had a thoracentesis with 500cc removed with improvement of his breathing.\" - MD Chris. IR was consulted for Right chest tube placement r/t loculation of right pleural effusion. IR Dr. Gallardo performed Right chest tube placement on 5/19/25.    Interval History:   05/20/25 - IR Right posterior 10F Chest tube to Pleur-Evac with 590 mL serosanguineous output in the last 24 hours. Chest tube to - 20 cm H2O suction; no leaks appreciated. Labs reviewed; WBC 6.8, H/H 11/33.3, Cr 0.71. Cultures pending. CXR this morning shows, \"Interval placement of small caliber RIGHT basilar chest tube; No pneumothorax; & Persistent moderate RIGHT pleural fluid collection with associated consolidation.\" SpO2 95% on room air. I discussed anticipated plan of care with patient. I discussed patient with hospital nursing staff and I reviewed IDT notes. Anticipate possible lytic treatment by pulm.    05/21/25 - IR Right posterior 10F Chest tube to Pleur-Evac with " "160 mL serosanguineous output in the last 24 hours. Chest tube clamped s/p lytic treatment by Pulm with patient laying with left side down; no leaks appreciated at time of assessment. Labs reviewed; WBC 6.5, H/H 11.4/34.4, Cr 0.77. Pleural fluid cultures (5/19) pending. CXR this morning delayed pending completion of lytic treatment. SpO2 94% on 1 Lpm Oxygen via NC. I discussed anticipated plan of care with patient. I reviewed IDT notes.    05/22/25 - Patient ambulating to restroom without difficulty. IR Right posterior 10F Chest tube to Pleur-Evac with 560 mL serosanguineous output in the last 24 hours. Chest tube to - 20 cm H2O suction; no leaks appreciated. Labs reviewed; WBC 7.2, H/H 11/34.1, Cr 0.73. Pleural fluid cultures (5/19) pending - NGTD. CXR from yesterday showed: \"Right pleural effusion appears slightly decreased with right pigtail chest tube in place. No pneumothorax.\" CXR today shows: \"Stable small right pleural effusion and associated atelectasis. No new consolidation. Right pigtail catheter tip projects over the right midlung, stable since prior. No pneumothorax.\" SpO2 90% on room air. I discussed anticipated plan of care with patient. I reviewed IDT notes.    05/23/25 - IR Right posterior 10F Chest tube to Pleur-Evac with 240 mL serosanguineous output in the last 24 hours. Chest tube to - 20 cm H2O suction; no leaks appreciated. Labs reviewed by me, including: WBC 8, H/H 11.3/34.3, Cr 0.7. Pleural fluid cultures (5/19) pending - NGTD. CXR today shows: \"Trace right pleural effusion, stable\" SpO2 92% on room air. I discussed anticipated plan of care with patient. I reviewed IDT notes.    05/24/25 - IR Right posterior 10F Chest tube to Pleur-Evac with 60 mL serosanguineous output in the last 24 hours. Chest tube to - 20 cm H2O suction; no leaks appreciated. Labs reviewed by me, including: WBC 8.2, H/H 12.7/39.7, Cr 0.87. Pleural fluid cultures (5/19) negative. CXR today shows: \"Right pleural pigtail " "drain remains. No pneumothorax visible. Mild right lower lung nodular opacity remains. Possible effusion.\" SpO2 92% on room air. I discussed anticipated plan of care with patient, Hospitalist, and Pulmonary. I reviewed IDT notes. CT today per Pulm, anticipate chest tube removal likely tomorrow.    05/25/25 - IR Right posterior 10F Chest tube to Pleur-Evac with 60 mL serosanguineous output in the last 24 hours. Chest tube to - 20 cm H2O suction; no leaks appreciated. Labs reviewed by me, including: WBC 8.6, H/H 11.2/34.9, Cr 0.69. Pleural fluid cultures (5/19) negative. CT shows: \"Small right pleural effusion remains, with small amount of air in the right posterior pleural space and associated atelectasis. Right pigtail chest tube is formed in the superior aspect of the effusion, in the right posterolateral pleural space.\" CXR today shows: \"Right pleural pigtail drain remains. No pneumothorax. Improving mild right base atelectasis and trace pleural fluid/thickening.\" SpO2 94% on room air. I discussed anticipated plan of care with patient and Pulmonary. I reviewed IDT notes. Anticipate decortication by CT surgery on Tuesday.    05/26/25 - IR Right posterior 10F Chest tube to Pleur-Evac with 0 mL output in the last 24 hours. Chest tube to - 20 cm H2O suction; no leaks appreciated. Labs reviewed by me, including: WBC 7.8, H/H 12/36, Cr 0.79. Pleural fluid cultures (5/19) negative. CXR today shows: \"Trace right pleural effusion, stable ... Trace recurrent right apical pneumothorax with thoracostomy tube in place\" SpO2 92% on room air. I reviewed IDT notes. Anticipate decortication by CT surgery on Tuesday.    Assessment/Plan     Principal Problem:    Atrial fibrillation with RVR ([facility])  Active Problems:    Sepsis (HCC)    Pleural effusion on right    QUE (acute kidney injury) ([facility])    High anion gap metabolic acidosis    Mass of lower lobe of left lung    Acute hypoxic respiratory failure (HCC)    Hypertension    Shock " ([facility])    Plan IR  - Anticipate decortication by Thoracic surgery on Tuesday.  - Continue Right posterior chest tube to suction -20 cm H2O; defer to pulm/CT surg s/p decortication.  - Thoracentesis fluid cultures (5/16) negative.  - Pleural fluid cultures (5/19) negative.  - Serial CXR  - Hospitalist, Pulmonary, and Thoracic surgery following.  - lytic therapy #1 by pulm: 5/21  - lytic therapy #2 by pulm: 5/22  - lytic therapy #3 by pulm: 5/23  - Recommend fu CT in 5-7 days (~5/31/25)  - Continue to monitor output, labs, and VS.  -  -Thank you for allowing Interventional Radiology team to participate in the patients care, if any additional care or requests are needed in the future please do not hesitate to call or place IR order.        Review of Systems  Physical Exam   Review of Systems   Constitutional:  Negative for chills, fever and malaise/fatigue.   Respiratory:  Positive for cough (with position changes). Negative for shortness of breath.    Cardiovascular:  Negative for chest pain (minor discomfort at chest tube insertion site).   Gastrointestinal:  Negative for abdominal pain, nausea and vomiting.      Vitals:    05/26/25 0750   BP: 108/75   Pulse: 80   Resp: 17   Temp: 36.8 °C (98.3 °F)   SpO2: 92%     Physical Exam  Vitals and nursing note reviewed.   Constitutional:       General: He is not in acute distress.  Cardiovascular:      Rate and Rhythm: Normal rate.   Pulmonary:      Effort: Pulmonary effort is normal. No respiratory distress.      Comments: IR Right Posterior 10F Chest tube in place.  Abdominal:      Palpations: Abdomen is soft.      Tenderness: There is no abdominal tenderness.   Skin:     General: Skin is warm and dry.   Neurological:      General: No focal deficit present.      Mental Status: He is alert and oriented to person, place, and time.   Psychiatric:         Mood and Affect: Mood normal.         Behavior: Behavior normal.          Labs    Recent Labs     05/24/25  0434  05/25/25  0117 05/26/25  0343   WBC 8.2 8.6 7.8   RBC 4.33* 3.87* 4.06*   HEMOGLOBIN 12.7* 11.2* 12.0*   HEMATOCRIT 39.7* 34.9* 36.0*   MCV 91.7 90.2 88.7   MCH 29.3 28.9 29.6   MCHC 32.0* 32.1* 33.3   RDW 42.6 41.6 41.0   PLATELETCT 450* 394 439   MPV 8.6* 8.7* 8.5*     Recent Labs     05/24/25  0434 05/25/25  0117 05/26/25  0343   SODIUM 137 137 137   POTASSIUM 4.4 4.0 4.3   CHLORIDE 102 104 105   CO2 21 21 22   GLUCOSE 102* 102* 100*   BUN 11 14 14   CREATININE 0.87 0.69 0.79   CALCIUM 8.7 8.1* 8.5     Recent Labs     05/24/25 0434 05/25/25 0117 05/26/25  0343   ALBUMIN 3.1* 2.9* 3.1*   TBILIRUBIN 0.3 0.3 0.3   ALKPHOSPHAT 166* 157* 161*   TOTPROTEIN 7.1 6.4 6.9   ALTSGPT 64* 71* 74*   ASTSGOT 40 42 37   CREATININE 0.87 0.69 0.79     DX-CHEST-LIMITED (1 VIEW)   Final Result         1.  Hazy right pulmonary infiltrates, slightly increased since prior study   2.  Trace right pleural effusion, stable   3.  Trace recurrent right apical pneumothorax with thoracostomy tube in place      DX-CHEST-LIMITED (1 VIEW)   Final Result         1. Improving mild right base atelectasis and trace pleural fluid/thickening.      2. Right pleural pigtail drain remains. No pneumothorax.                     CT-CHEST (THORAX) W/O   Final Result      1.  Small right pleural effusion remains, with small amount of air in the right posterior pleural space and associated atelectasis. Right pigtail chest tube is formed in the superior aspect of the effusion, in the right posterolateral pleural space.   2.  A 2.3 cm pleural-based nodular opacity in the left posterior costophrenic angle, possibly round atelectasis. Recommend follow-up with another chest CT in 2-3 months.   3.  Borderline enlarged mediastinal lymph nodes measuring up to 9 mm short axis, statistically reactive.         DX-CHEST-LIMITED (1 VIEW)   Final Result         1. No change.      2. Right pleural pigtail drain. No pneumothorax. Possible trace effusion.      3. Persistent  mild right basilar opacity.                     DX-CHEST-LIMITED (1 VIEW)   Final Result         1.  Hazy right pulmonary infiltrates, stable   2.  Trace right pleural effusion, stable      DX-CHEST-LIMITED (1 VIEW)   Final Result      1.  Stable small right pleural effusion and associated atelectasis. No new consolidation.   2.  Right pigtail catheter tip projects over the right midlung, stable since prior. No pneumothorax.      DX-CHEST-2 VIEWS   Final Result      Right pleural effusion appears slightly decreased with right pigtail chest tube in place. No pneumothorax.      DX-CHEST-2 VIEWS   Final Result      1.  Interval placement of small caliber RIGHT basilar chest tube.   2.  No pneumothorax.   3.  Persistent moderate RIGHT pleural fluid collection with associated consolidation.      CT-CHEST TUBE PLACEMENT (EMPYEMA AND PNEUMO)   Final Result      Successful CT GUIDED right  CHEST TUBE PLACEMENT.      CT-CHEST (THORAX) W/O   Final Result      1.  Loculated right pleural effusion has increased from the prior CT. Adjacent airspace opacification may represent atelectasis or pneumonia.   2.  Small left pleural effusion with adjacent compressive atelectasis.   3.  Mild mediastinal adenopathy is likely reactive.         DX-CHEST-PORTABLE (1 VIEW)   Final Result      1.  Persistent right pleural effusion and associated atelectasis/consolidation.   2.  No pneumothorax status post right-sided thoracentesis.      US-THORACENTESIS PUNCTURE RIGHT   Final Result      1. Ultrasound guided right sided diagnostic and therapeutic thoracentesis.      2. 500 mL of fluid withdrawn.      DX-CHEST-PORTABLE (1 VIEW)   Final Result         1. Moderate right pleural effusion and atelectasis unchanged.                  EC-ECHOCARDIOGRAM COMPLETE W/O CONT   Final Result      DX-CHEST-PORTABLE (1 VIEW)   Final Result      1.  Hypoinflation.   2.  Moderate right pleural effusion and underlying atelectasis or consolidation. As previously  "mentioned, possible underlying pneumonia.   3.  Minimal left pleural effusion and subsegmental atelectatic change.      DX-CHEST-LIMITED (1 VIEW)   Final Result      Airspace opacity in the right lower lobe, concerning for pneumonia.      CT-CTA COMPLETE THORACOABDOMINAL AORTA   Final Result      1.  No evidence for aortic aneurysm or dissection.   2.  Loculated right pleural effusion with associated compressive atelectasis and consolidation.   3.  Masslike area of consolidation in the left posterior medial costophrenic angle. This may be due to infection or neoplasm. Clinical correlation and follow-up is needed.   4.  Hepatomegaly with fatty infiltration of the liver.                          INR   Date Value Ref Range Status   05/14/2025 1.15 (H) 0.87 - 1.13 Final     Comment:     INR - Non-therapeutic Reference Range: 0.87-1.13  INR - Therapeutic Reference Range: 2.0-4.0       No results found for: \"POCINR\"     Intake/Output Summary (Last 24 hours) at 5/20/2025 0909  Last data filed at 5/20/2025 0645  Gross per 24 hour   Intake --   Output 590 ml   Net -590 ml      I have personally reviewed the above labs and imaging      I have performed a physical exam and reviewed and updated ROS and Plan today (5/26/2025).     35 minutes in directly providing and coordinating care and extensive data review.  No time overlap and excludes procedures.   "

## 2025-05-27 ENCOUNTER — PHARMACY VISIT (OUTPATIENT)
Dept: PHARMACY | Facility: MEDICAL CENTER | Age: 60
End: 2025-05-27
Payer: COMMERCIAL

## 2025-05-27 ENCOUNTER — APPOINTMENT (OUTPATIENT)
Dept: RADIOLOGY | Facility: MEDICAL CENTER | Age: 60
DRG: 871 | End: 2025-05-27
Payer: COMMERCIAL

## 2025-05-27 ENCOUNTER — APPOINTMENT (OUTPATIENT)
Dept: RADIOLOGY | Facility: MEDICAL CENTER | Age: 60
DRG: 871 | End: 2025-05-27
Attending: STUDENT IN AN ORGANIZED HEALTH CARE EDUCATION/TRAINING PROGRAM
Payer: COMMERCIAL

## 2025-05-27 VITALS
SYSTOLIC BLOOD PRESSURE: 123 MMHG | HEART RATE: 97 BPM | TEMPERATURE: 98.3 F | BODY MASS INDEX: 32.13 KG/M2 | HEIGHT: 72 IN | WEIGHT: 237.22 LBS | DIASTOLIC BLOOD PRESSURE: 86 MMHG | RESPIRATION RATE: 14 BRPM | OXYGEN SATURATION: 95 %

## 2025-05-27 LAB
ALBUMIN SERPL BCP-MCNC: 3 G/DL (ref 3.2–4.9)
ALBUMIN/GLOB SERPL: 0.8 G/DL
ALP SERPL-CCNC: 155 U/L (ref 30–99)
ALT SERPL-CCNC: 62 U/L (ref 2–50)
ANION GAP SERPL CALC-SCNC: 13 MMOL/L (ref 7–16)
AST SERPL-CCNC: 27 U/L (ref 12–45)
BASOPHILS # BLD AUTO: 0.4 % (ref 0–1.8)
BASOPHILS # BLD: 0.03 K/UL (ref 0–0.12)
BILIRUB SERPL-MCNC: 0.2 MG/DL (ref 0.1–1.5)
BUN SERPL-MCNC: 15 MG/DL (ref 8–22)
CALCIUM ALBUM COR SERPL-MCNC: 9.5 MG/DL (ref 8.5–10.5)
CALCIUM SERPL-MCNC: 8.7 MG/DL (ref 8.5–10.5)
CHLORIDE SERPL-SCNC: 107 MMOL/L (ref 96–112)
CO2 SERPL-SCNC: 20 MMOL/L (ref 20–33)
CREAT SERPL-MCNC: 0.85 MG/DL (ref 0.5–1.4)
EKG IMPRESSION: NORMAL
EOSINOPHIL # BLD AUTO: 0.1 K/UL (ref 0–0.51)
EOSINOPHIL NFR BLD: 1.4 % (ref 0–6.9)
ERYTHROCYTE [DISTWIDTH] IN BLOOD BY AUTOMATED COUNT: 41 FL (ref 35.9–50)
GFR SERPLBLD CREATININE-BSD FMLA CKD-EPI: 99 ML/MIN/1.73 M 2
GLOBULIN SER CALC-MCNC: 3.8 G/DL (ref 1.9–3.5)
GLUCOSE SERPL-MCNC: 92 MG/DL (ref 65–99)
HCT VFR BLD AUTO: 37 % (ref 42–52)
HGB BLD-MCNC: 12.1 G/DL (ref 14–18)
IMM GRANULOCYTES # BLD AUTO: 0.04 K/UL (ref 0–0.11)
IMM GRANULOCYTES NFR BLD AUTO: 0.6 % (ref 0–0.9)
LYMPHOCYTES # BLD AUTO: 1.37 K/UL (ref 1–4.8)
LYMPHOCYTES NFR BLD: 19.8 % (ref 22–41)
MAGNESIUM SERPL-MCNC: 2.2 MG/DL (ref 1.5–2.5)
MCH RBC QN AUTO: 29.2 PG (ref 27–33)
MCHC RBC AUTO-ENTMCNC: 32.7 G/DL (ref 32.3–36.5)
MCV RBC AUTO: 89.2 FL (ref 81.4–97.8)
MONOCYTES # BLD AUTO: 0.67 K/UL (ref 0–0.85)
MONOCYTES NFR BLD AUTO: 9.7 % (ref 0–13.4)
NEUTROPHILS # BLD AUTO: 4.72 K/UL (ref 1.82–7.42)
NEUTROPHILS NFR BLD: 68.1 % (ref 44–72)
NRBC # BLD AUTO: 0 K/UL
NRBC BLD-RTO: 0 /100 WBC (ref 0–0.2)
PLATELET # BLD AUTO: 438 K/UL (ref 164–446)
PMV BLD AUTO: 8.6 FL (ref 9–12.9)
POTASSIUM SERPL-SCNC: 4.3 MMOL/L (ref 3.6–5.5)
PROT SERPL-MCNC: 6.8 G/DL (ref 6–8.2)
RBC # BLD AUTO: 4.15 M/UL (ref 4.7–6.1)
SODIUM SERPL-SCNC: 140 MMOL/L (ref 135–145)
WBC # BLD AUTO: 6.9 K/UL (ref 4.8–10.8)

## 2025-05-27 PROCEDURE — 99239 HOSP IP/OBS DSCHRG MGMT >30: CPT | Performed by: INTERNAL MEDICINE

## 2025-05-27 PROCEDURE — RXMED WILLOW AMBULATORY MEDICATION CHARGE: Performed by: INTERNAL MEDICINE

## 2025-05-27 PROCEDURE — 700102 HCHG RX REV CODE 250 W/ 637 OVERRIDE(OP): Performed by: INTERNAL MEDICINE

## 2025-05-27 PROCEDURE — A9270 NON-COVERED ITEM OR SERVICE: HCPCS | Performed by: INTERNAL MEDICINE

## 2025-05-27 PROCEDURE — 71045 X-RAY EXAM CHEST 1 VIEW: CPT

## 2025-05-27 PROCEDURE — 80053 COMPREHEN METABOLIC PANEL: CPT

## 2025-05-27 PROCEDURE — 700105 HCHG RX REV CODE 258: Performed by: STUDENT IN AN ORGANIZED HEALTH CARE EDUCATION/TRAINING PROGRAM

## 2025-05-27 PROCEDURE — A9270 NON-COVERED ITEM OR SERVICE: HCPCS | Performed by: STUDENT IN AN ORGANIZED HEALTH CARE EDUCATION/TRAINING PROGRAM

## 2025-05-27 PROCEDURE — 93005 ELECTROCARDIOGRAM TRACING: CPT | Mod: TC | Performed by: INTERNAL MEDICINE

## 2025-05-27 PROCEDURE — 36415 COLL VENOUS BLD VENIPUNCTURE: CPT

## 2025-05-27 PROCEDURE — 93010 ELECTROCARDIOGRAM REPORT: CPT | Performed by: INTERNAL MEDICINE

## 2025-05-27 PROCEDURE — 85025 COMPLETE CBC W/AUTO DIFF WBC: CPT

## 2025-05-27 PROCEDURE — 700102 HCHG RX REV CODE 250 W/ 637 OVERRIDE(OP): Performed by: STUDENT IN AN ORGANIZED HEALTH CARE EDUCATION/TRAINING PROGRAM

## 2025-05-27 PROCEDURE — 83735 ASSAY OF MAGNESIUM: CPT

## 2025-05-27 PROCEDURE — 700111 HCHG RX REV CODE 636 W/ 250 OVERRIDE (IP): Mod: JZ | Performed by: STUDENT IN AN ORGANIZED HEALTH CARE EDUCATION/TRAINING PROGRAM

## 2025-05-27 RX ORDER — FOLIC ACID 1 MG/1
1 TABLET ORAL DAILY
COMMUNITY
Start: 2025-05-27

## 2025-05-27 RX ORDER — BENZONATATE 100 MG/1
100 CAPSULE ORAL 3 TIMES DAILY PRN
Qty: 60 CAPSULE | Refills: 0 | Status: SHIPPED | OUTPATIENT
Start: 2025-05-27 | End: 2025-06-16

## 2025-05-27 RX ORDER — METOPROLOL TARTRATE 25 MG/1
25 TABLET, FILM COATED ORAL 2 TIMES DAILY
Qty: 60 TABLET | Refills: 0 | Status: SHIPPED | OUTPATIENT
Start: 2025-05-27

## 2025-05-27 RX ORDER — GUAIFENESIN/DEXTROMETHORPHAN 100-10MG/5
5 SYRUP ORAL EVERY 6 HOURS
COMMUNITY
Start: 2025-05-27

## 2025-05-27 RX ORDER — LANOLIN ALCOHOL/MO/W.PET/CERES
100 CREAM (GRAM) TOPICAL DAILY
Qty: 30 TABLET | Refills: 0 | Status: SHIPPED | OUTPATIENT
Start: 2025-05-27

## 2025-05-27 RX ADMIN — GUAIFENESIN SYRUP AND DEXTROMETHORPHAN 5 ML: 100; 10 SYRUP ORAL at 00:00

## 2025-05-27 RX ADMIN — METOPROLOL TARTRATE 25 MG: 25 TABLET, FILM COATED ORAL at 18:40

## 2025-05-27 RX ADMIN — METOPROLOL TARTRATE 25 MG: 25 TABLET, FILM COATED ORAL at 05:06

## 2025-05-27 RX ADMIN — GUAIFENESIN SYRUP AND DEXTROMETHORPHAN 5 ML: 100; 10 SYRUP ORAL at 05:06

## 2025-05-27 RX ADMIN — AMPICILLIN AND SULBACTAM 3 G: 1; 2 INJECTION, POWDER, FOR SOLUTION INTRAMUSCULAR; INTRAVENOUS at 13:29

## 2025-05-27 RX ADMIN — GUAIFENESIN SYRUP AND DEXTROMETHORPHAN 5 ML: 100; 10 SYRUP ORAL at 13:29

## 2025-05-27 RX ADMIN — AMPICILLIN AND SULBACTAM 3 G: 1; 2 INJECTION, POWDER, FOR SOLUTION INTRAMUSCULAR; INTRAVENOUS at 08:36

## 2025-05-27 RX ADMIN — AMPICILLIN AND SULBACTAM 3 G: 1; 2 INJECTION, POWDER, FOR SOLUTION INTRAMUSCULAR; INTRAVENOUS at 02:01

## 2025-05-27 ASSESSMENT — ENCOUNTER SYMPTOMS
FEVER: 0
HEADACHES: 0
COUGH: 0
ABDOMINAL PAIN: 0
CHILLS: 0
VOMITING: 0
SPUTUM PRODUCTION: 0
SHORTNESS OF BREATH: 0
WEAKNESS: 0
MYALGIAS: 0
PSYCHIATRIC NEGATIVE: 1
NAUSEA: 0

## 2025-05-27 NOTE — PROGRESS NOTES
"Radiology Progress Note     Author:  Sonya Flores DNP Date & Time created: 5/27/2025  4:05 PM   Date of admission  5/14/2025  Note to reader: this note follows the APSO format rather than the historical SOAP format. Assessment and plan located at the top of the note for ease of use.    Chief Complaint  60 y.o. male admitted 5/14/2025 with   Chief Complaint   Patient presents with    Flank Pain     Right sided flank pain x 5 days, pt denies dysuria but reports pain is darker than normal. +nausea.        HPI  Marlo Gusman is a 60 y.o. male with PMH significant for HTN and DLD who presented with shortness of breath and right sided flank pain x 5 days.  He attributed flank pain to a recent upper respiratory infection he was battling for past 2 weeks with ongoing fever, chills, and coughing.  5/17/2025 CT showed a loculated right pleural effusion.  5/16/2025 thoracentesis with 500cc removed with improvement of symptoms. IR was consulted for Right chest tube placement r/t loculation of right pleural effusion. IR Dr. Gallardo performed Right chest tube placement on 5/19/25.    Interval History:   05/20/25 - IR Right posterior 10F Chest tube to Pleur-Evac with 590 mL serosanguineous output in the last 24 hours. Chest tube to - 20 cm H2O suction; no leaks appreciated. Labs reviewed; WBC 6.8, H/H 11/33.3, Cr 0.71. Cultures pending. CXR this morning shows, \"Interval placement of small caliber RIGHT basilar chest tube; No pneumothorax; & Persistent moderate RIGHT pleural fluid collection with associated consolidation.\" SpO2 95% on room air. I discussed anticipated plan of care with patient. I discussed patient with hospital nursing staff and I reviewed IDT notes. Anticipate possible lytic treatment by pulm.    05/21/25 - IR Right posterior 10F Chest tube to Pleur-Evac with 160 mL serosanguineous output in the last 24 hours. Chest tube clamped s/p lytic treatment by Pulm with patient laying with left side down; no leaks appreciated " "at time of assessment. Labs reviewed; WBC 6.5, H/H 11.4/34.4, Cr 0.77. Pleural fluid cultures (5/19) pending. CXR this morning delayed pending completion of lytic treatment. SpO2 94% on 1 Lpm Oxygen via NC. I discussed anticipated plan of care with patient. I reviewed IDT notes.    05/22/25 - Patient ambulating to restroom without difficulty. IR Right posterior 10F Chest tube to Pleur-Evac with 560 mL serosanguineous output in the last 24 hours. Chest tube to - 20 cm H2O suction; no leaks appreciated. Labs reviewed; WBC 7.2, H/H 11/34.1, Cr 0.73. Pleural fluid cultures (5/19) pending - NGTD. CXR from yesterday showed: \"Right pleural effusion appears slightly decreased with right pigtail chest tube in place. No pneumothorax.\" CXR today shows: \"Stable small right pleural effusion and associated atelectasis. No new consolidation. Right pigtail catheter tip projects over the right midlung, stable since prior. No pneumothorax.\" SpO2 90% on room air. I discussed anticipated plan of care with patient. I reviewed IDT notes.    05/23/25 - IR Right posterior 10F Chest tube to Pleur-Evac with 240 mL serosanguineous output in the last 24 hours. Chest tube to - 20 cm H2O suction; no leaks appreciated. Labs reviewed by me, including: WBC 8, H/H 11.3/34.3, Cr 0.7. Pleural fluid cultures (5/19) pending - NGTD. CXR today shows: \"Trace right pleural effusion, stable\" SpO2 92% on room air. I discussed anticipated plan of care with patient. I reviewed IDT notes.    05/24/25 - IR Right posterior 10F Chest tube to Pleur-Evac with 60 mL serosanguineous output in the last 24 hours. Chest tube to - 20 cm H2O suction; no leaks appreciated. Labs reviewed by me, including: WBC 8.2, H/H 12.7/39.7, Cr 0.87. Pleural fluid cultures (5/19) negative. CXR today shows: \"Right pleural pigtail drain remains. No pneumothorax visible. Mild right lower lung nodular opacity remains. Possible effusion.\" SpO2 92% on room air. I discussed anticipated plan of " "care with patient, Hospitalist, and Pulmonary. I reviewed IDT notes. CT today per Pulm, anticipate chest tube removal likely tomorrow.    05/25/25 - IR Right posterior 10F Chest tube to Pleur-Evac with 60 mL serosanguineous output in the last 24 hours. Chest tube to - 20 cm H2O suction; no leaks appreciated. Labs reviewed by me, including: WBC 8.6, H/H 11.2/34.9, Cr 0.69. Pleural fluid cultures (5/19) negative. CT shows: \"Small right pleural effusion remains, with small amount of air in the right posterior pleural space and associated atelectasis. Right pigtail chest tube is formed in the superior aspect of the effusion, in the right posterolateral pleural space.\" CXR today shows: \"Right pleural pigtail drain remains. No pneumothorax. Improving mild right base atelectasis and trace pleural fluid/thickening.\" SpO2 94% on room air. I discussed anticipated plan of care with patient and Pulmonary. I reviewed IDT notes. Anticipate decortication by CT surgery on Tuesday.    05/26/25 - IR Right posterior 10F Chest tube to Pleur-Evac with 0 mL output in the last 24 hours. Chest tube to - 20 cm H2O suction; no leaks appreciated. Labs reviewed by me, including: WBC 7.8, H/H 12/36, Cr 0.79. Pleural fluid cultures (5/19) negative. CXR today shows: \"Trace right pleural effusion, stable ... Trace recurrent right apical pneumothorax with thoracostomy tube in place\" SpO2 92% on room air. I reviewed IDT notes. Anticipate decortication by CT surgery on Tuesday.    5/27/2025: Right posterior chest tube (10F) to Pleur-evac with -20 cm H2O suction.  No appreciable air leak noted.  Thoracic surgeon deferred surgical decortication.  Following discussion with pulmonary medicine and interventional radiology, I removed right posterior chest tube and all visible suture.  Occlusive dressing with Tegaderm and gauze to site.  Patient notes no increase in shortness of breath or dyspnea, stating he can now take a deep breath without significant " discomfort.  05/27/2025 CXR s/p right posterior chest tube removal demonstrates no definite pneumothorax.  I reviewed patient's most recent labs including WBC 6.9, Hgb 12.1, CR 0.85.  SpO2 95% on room air.  Coordinated post IR procedure care with patient, supportive spouse at bedside, interventional radiologist, pulmonary medicine, hospitalist, and hospital nursing staff.    Assessment/Plan     Principal Problem:    Atrial fibrillation with RVR (HCC)  Active Problems:    Sepsis (HCC)    Pleural effusion on right    QUE (acute kidney injury) (HCC)    High anion gap metabolic acidosis    Mass of lower lobe of left lung    Acute hypoxic respiratory failure (HCC)    Hypertension    Shock (HCC)    Plan IR  - Removal of right posterior chest tube and all visible suture (5/27/2025)  - Thoracentesis fluid cultures (5/16) negative.  - Pleural fluid cultures (5/19) negative.  - Hospitalist, Pulmonary, and Thoracic surgery following.  - lytic therapy #1 by pulm: 5/21  - lytic therapy #2 by pulm: 5/22  - lytic therapy #3 by pulm: 5/23  - Continue to monitor labs and VS.    -Thank you for allowing Interventional Radiology team to participate in the patients care, if any additional care or requests are needed in the future please do not hesitate to call or place IR order.        Review of Systems  Physical Exam   Review of Systems   Constitutional:  Negative for chills, fever and malaise/fatigue.   Respiratory:  Negative for cough (with position changes), sputum production and shortness of breath.    Cardiovascular:  Negative for chest pain (minor discomfort at chest tube insertion site) and leg swelling.   Gastrointestinal:  Negative for abdominal pain, nausea and vomiting.   Musculoskeletal:  Negative for myalgias.   Neurological:  Negative for weakness and headaches.   Psychiatric/Behavioral: Negative.        Vitals:    05/27/25 1524   BP: 123/86   Pulse: 97   Resp: 14   Temp: 36.8 °C (98.3 °F)   SpO2: 95%     Physical  Exam  Vitals and nursing note reviewed.   Constitutional:       General: He is not in acute distress.     Appearance: He is not ill-appearing.   HENT:      Head: Atraumatic.   Cardiovascular:      Rate and Rhythm: Normal rate.   Pulmonary:      Effort: Pulmonary effort is normal. No respiratory distress.   Abdominal:      Tenderness: There is no abdominal tenderness.   Musculoskeletal:      Right lower leg: No edema.      Left lower leg: No edema.   Skin:     General: Skin is warm and dry.      Coloration: Skin is not pale.   Neurological:      General: No focal deficit present.      Mental Status: He is alert and oriented to person, place, and time.   Psychiatric:         Mood and Affect: Mood normal.         Behavior: Behavior normal.          Labs    Recent Labs     05/25/25  0117 05/26/25 0343 05/27/25  0416   WBC 8.6 7.8 6.9   RBC 3.87* 4.06* 4.15*   HEMOGLOBIN 11.2* 12.0* 12.1*   HEMATOCRIT 34.9* 36.0* 37.0*   MCV 90.2 88.7 89.2   MCH 28.9 29.6 29.2   MCHC 32.1* 33.3 32.7   RDW 41.6 41.0 41.0   PLATELETCT 394 439 438   MPV 8.7* 8.5* 8.6*     Recent Labs     05/25/25 0117 05/26/25  0343 05/27/25  0416   SODIUM 137 137 140   POTASSIUM 4.0 4.3 4.3   CHLORIDE 104 105 107   CO2 21 22 20   GLUCOSE 102* 100* 92   BUN 14 14 15   CREATININE 0.69 0.79 0.85   CALCIUM 8.1* 8.5 8.7     Recent Labs     05/25/25  0117 05/26/25  0343 05/27/25  0416   ALBUMIN 2.9* 3.1* 3.0*   TBILIRUBIN 0.3 0.3 0.2   ALKPHOSPHAT 157* 161* 155*   TOTPROTEIN 6.4 6.9 6.8   ALTSGPT 71* 74* 62*   ASTSGOT 42 37 27   CREATININE 0.69 0.79 0.85     DX-CHEST-PORTABLE (1 VIEW)   Final Result      Interval removal of the right-sided chest tube. There is no definite pneumothorax.      DX-CHEST-LIMITED (1 VIEW)   Final Result         1.  Hazy right pulmonary infiltrates, slightly increased since prior study   2.  Trace bilateral pleural effusions, stable      DX-CHEST-LIMITED (1 VIEW)   Final Result         1.  Hazy right pulmonary infiltrates, slightly  increased since prior study   2.  Trace right pleural effusion, stable   3.  Trace recurrent right apical pneumothorax with thoracostomy tube in place      DX-CHEST-LIMITED (1 VIEW)   Final Result         1. Improving mild right base atelectasis and trace pleural fluid/thickening.      2. Right pleural pigtail drain remains. No pneumothorax.                     CT-CHEST (THORAX) W/O   Final Result      1.  Small right pleural effusion remains, with small amount of air in the right posterior pleural space and associated atelectasis. Right pigtail chest tube is formed in the superior aspect of the effusion, in the right posterolateral pleural space.   2.  A 2.3 cm pleural-based nodular opacity in the left posterior costophrenic angle, possibly round atelectasis. Recommend follow-up with another chest CT in 2-3 months.   3.  Borderline enlarged mediastinal lymph nodes measuring up to 9 mm short axis, statistically reactive.         DX-CHEST-LIMITED (1 VIEW)   Final Result         1. No change.      2. Right pleural pigtail drain. No pneumothorax. Possible trace effusion.      3. Persistent mild right basilar opacity.                     DX-CHEST-LIMITED (1 VIEW)   Final Result         1.  Hazy right pulmonary infiltrates, stable   2.  Trace right pleural effusion, stable      DX-CHEST-LIMITED (1 VIEW)   Final Result      1.  Stable small right pleural effusion and associated atelectasis. No new consolidation.   2.  Right pigtail catheter tip projects over the right midlung, stable since prior. No pneumothorax.      DX-CHEST-2 VIEWS   Final Result      Right pleural effusion appears slightly decreased with right pigtail chest tube in place. No pneumothorax.      DX-CHEST-2 VIEWS   Final Result      1.  Interval placement of small caliber RIGHT basilar chest tube.   2.  No pneumothorax.   3.  Persistent moderate RIGHT pleural fluid collection with associated consolidation.      CT-CHEST TUBE PLACEMENT (EMPYEMA AND  PNEUMO)   Final Result      Successful CT GUIDED right  CHEST TUBE PLACEMENT.      CT-CHEST (THORAX) W/O   Final Result      1.  Loculated right pleural effusion has increased from the prior CT. Adjacent airspace opacification may represent atelectasis or pneumonia.   2.  Small left pleural effusion with adjacent compressive atelectasis.   3.  Mild mediastinal adenopathy is likely reactive.         DX-CHEST-PORTABLE (1 VIEW)   Final Result      1.  Persistent right pleural effusion and associated atelectasis/consolidation.   2.  No pneumothorax status post right-sided thoracentesis.      US-THORACENTESIS PUNCTURE RIGHT   Final Result      1. Ultrasound guided right sided diagnostic and therapeutic thoracentesis.      2. 500 mL of fluid withdrawn.      DX-CHEST-PORTABLE (1 VIEW)   Final Result         1. Moderate right pleural effusion and atelectasis unchanged.                  EC-ECHOCARDIOGRAM COMPLETE W/O CONT   Final Result      DX-CHEST-PORTABLE (1 VIEW)   Final Result      1.  Hypoinflation.   2.  Moderate right pleural effusion and underlying atelectasis or consolidation. As previously mentioned, possible underlying pneumonia.   3.  Minimal left pleural effusion and subsegmental atelectatic change.      DX-CHEST-LIMITED (1 VIEW)   Final Result      Airspace opacity in the right lower lobe, concerning for pneumonia.      CT-CTA COMPLETE THORACOABDOMINAL AORTA   Final Result      1.  No evidence for aortic aneurysm or dissection.   2.  Loculated right pleural effusion with associated compressive atelectasis and consolidation.   3.  Masslike area of consolidation in the left posterior medial costophrenic angle. This may be due to infection or neoplasm. Clinical correlation and follow-up is needed.   4.  Hepatomegaly with fatty infiltration of the liver.                          INR   Date Value Ref Range Status   05/14/2025 1.15 (H) 0.87 - 1.13 Final     Comment:     INR - Non-therapeutic Reference Range:  "0.87-1.13  INR - Therapeutic Reference Range: 2.0-4.0       No results found for: \"POCINR\"     Intake/Output Summary (Last 24 hours) at 5/20/2025 0909  Last data filed at 5/20/2025 0645  Gross per 24 hour   Intake --   Output 590 ml   Net -590 ml      I have personally reviewed the above labs and imaging      I have performed a physical exam and reviewed and updated ROS and Plan today (5/27/2025).     43 minutes in directly providing and coordinating care and extensive data review.  No time overlap and excludes procedures.   "

## 2025-05-27 NOTE — DISCHARGE PLANNING
Case Management Discharge Planning    Admission Date: 5/14/2025  GMLOS: 4.9  ALOS: 13    6-Clicks ADL Score: 22  6-Clicks Mobility Score: 23      Anticipated Discharge Dispo: Discharge Disposition: Discharged to home/self care (01)    DME Needed: No    Action(s) Taken: Patient was discussed in IDT rounds.  Per MD, patient is not yet medically cleared.  Patient is followed by IR and has a chest tube in place.  Patient is scheduled for VATs procedure.  Patient is on room air.  6 clicks 22/23.  No anticipated CM needs at this time.  Pending medical clearance.      Escalations Completed: None    Medically Clear: No    Next Steps: RN CM to continue to follow for DC planning      Barriers to Discharge: Medical clearance

## 2025-05-27 NOTE — CARE PLAN
The patient is Stable - Low risk of patient condition declining or worsening    Shift Goals  Clinical Goals: continue IV abx, NPO at midnight  Patient Goals: rest  Family Goals: Updates    Progress made toward(s) clinical / shift goals:    Problem: Knowledge Deficit - Standard  Goal: Patient and family/care givers will demonstrate understanding of plan of care, disease process/condition, diagnostic tests and medications  Outcome: Progressing     Problem: Pain - Standard  Goal: Alleviation of pain or a reduction in pain to the patient’s comfort goal  Outcome: Progressing       Patient is not progressing towards the following goals:

## 2025-05-28 ENCOUNTER — PHARMACY VISIT (OUTPATIENT)
Dept: PHARMACY | Facility: MEDICAL CENTER | Age: 60
End: 2025-05-28
Payer: COMMERCIAL

## 2025-05-28 NOTE — CARE PLAN
The patient is Stable - Low risk of patient condition declining or worsening    Shift Goals  Clinical Goals: continue IV abx, NPO at midnight  Patient Goals: rest  Family Goals: Updates    Progress made toward(s) clinical / shift goals:  education done on POC, all questions and concerns were addressed    Problem: Knowledge Deficit - Standard  Goal: Patient and family/care givers will demonstrate understanding of plan of care, disease process/condition, diagnostic tests and medications  Outcome: Progressing     Problem: Pain - Standard  Goal: Alleviation of pain or a reduction in pain to the patient’s comfort goal  Outcome: Progressing       Patient is not progressing towards the following goals:

## 2025-05-28 NOTE — DISCHARGE INSTRUCTIONS
ollow up with ZOHAIB Vaughan in 1 week   Follow up with Cardiology in 1 week for new Afib CHADSVASC scoere is 1 on aspirin. Echo EF normal RV normal atria normal. TSH normal range. Defer further work-up   Follow up with Dr. Proctor, thoracic surgery in 1 week to follow empyema. Currently nonsurgical on empiric antibiotics.

## 2025-05-28 NOTE — PROGRESS NOTES
Discharge order received. PIV removed, tip intact, no issues noted.  Printed discharge instructions and prescriptions given to pt. All discharge education complete, specifically need to f/u with PCP/cardiology/ and come back through the ED for new or worsening symptoms, all questions and concerns were addressed.

## 2025-05-28 NOTE — DISCHARGE SUMMARY
Discharge Summary    CHIEF COMPLAINT ON ADMISSION  Chief Complaint   Patient presents with    Flank Pain     Right sided flank pain x 5 days, pt denies dysuria but reports pain is darker than normal. +nausea.        Reason for Admission  Right Sided Flank Pain     Admission Date  5/14/2025    CODE STATUS  Full Code    HPI & HOSPITAL COURSE  This is a 60 y.o. male here with Flank Pain (Right sided flank pain x 5 days, pt denies dysuria but reports pain is darker than normal. +nausea. )  Please review Dr. Ricardo Gtz M.D. notes for further details of history of present illness, past medical/social/family histories, allergies and medications. Please review Dr. Gallardo, IR, Dr. Proctor, Thoracic Surgery, Dr. Jensen, Hospitalist consultation notes.    Managing hypoxia/septic shock\/parapneumonic effusion/LLL pneumonia, QUE, new Afib w RVR. He is obese, denies smoking, drinks alcohol moderately, has a history of hypertension. He was fiorst managed at the ICU. R chest tube placed by Dr. Gallardo, IR 5/19. Pulmonology, IR followed. Dr. Proctor, Thoracic consulted. He felt emyema is small and he and patient opted for nonsurgical intervention. No need for decortication.Pulmonology discontinued the chest tube. They both cleared patient for discharge, recommended 2 weeks of PO antibiotics and follow-up. He was weaned off and did NOT require oxygen. Metoprolol started for rate control and given CHADSVAsc score 1 no indication for anticoagulation. I ordered repeat EKG and that is sinus. Echo showed normal EF and RV. TSH within normal limits. He can follow up with Cardiology outpatient.     At discharge date, Marlo Gusman afebrile and hemodynamically stable.  Marlo Gusman wanted to be discharged today.      Imaging  DX-CHEST-PORTABLE (1 VIEW)   Final Result      Interval removal of the right-sided chest tube. There is no definite pneumothorax.      DX-CHEST-LIMITED (1 VIEW)   Final Result         1.  Hazy right pulmonary  infiltrates, slightly increased since prior study   2.  Trace bilateral pleural effusions, stable      DX-CHEST-LIMITED (1 VIEW)   Final Result         1.  Hazy right pulmonary infiltrates, slightly increased since prior study   2.  Trace right pleural effusion, stable   3.  Trace recurrent right apical pneumothorax with thoracostomy tube in place      DX-CHEST-LIMITED (1 VIEW)   Final Result         1. Improving mild right base atelectasis and trace pleural fluid/thickening.      2. Right pleural pigtail drain remains. No pneumothorax.                     CT-CHEST (THORAX) W/O   Final Result      1.  Small right pleural effusion remains, with small amount of air in the right posterior pleural space and associated atelectasis. Right pigtail chest tube is formed in the superior aspect of the effusion, in the right posterolateral pleural space.   2.  A 2.3 cm pleural-based nodular opacity in the left posterior costophrenic angle, possibly round atelectasis. Recommend follow-up with another chest CT in 2-3 months.   3.  Borderline enlarged mediastinal lymph nodes measuring up to 9 mm short axis, statistically reactive.         DX-CHEST-LIMITED (1 VIEW)   Final Result         1. No change.      2. Right pleural pigtail drain. No pneumothorax. Possible trace effusion.      3. Persistent mild right basilar opacity.                     DX-CHEST-LIMITED (1 VIEW)   Final Result         1.  Hazy right pulmonary infiltrates, stable   2.  Trace right pleural effusion, stable      DX-CHEST-LIMITED (1 VIEW)   Final Result      1.  Stable small right pleural effusion and associated atelectasis. No new consolidation.   2.  Right pigtail catheter tip projects over the right midlung, stable since prior. No pneumothorax.      DX-CHEST-2 VIEWS   Final Result      Right pleural effusion appears slightly decreased with right pigtail chest tube in place. No pneumothorax.      DX-CHEST-2 VIEWS   Final Result      1.  Interval placement of  small caliber RIGHT basilar chest tube.   2.  No pneumothorax.   3.  Persistent moderate RIGHT pleural fluid collection with associated consolidation.      CT-CHEST TUBE PLACEMENT (EMPYEMA AND PNEUMO)   Final Result      Successful CT GUIDED right  CHEST TUBE PLACEMENT.      CT-CHEST (THORAX) W/O   Final Result      1.  Loculated right pleural effusion has increased from the prior CT. Adjacent airspace opacification may represent atelectasis or pneumonia.   2.  Small left pleural effusion with adjacent compressive atelectasis.   3.  Mild mediastinal adenopathy is likely reactive.         DX-CHEST-PORTABLE (1 VIEW)   Final Result      1.  Persistent right pleural effusion and associated atelectasis/consolidation.   2.  No pneumothorax status post right-sided thoracentesis.      US-THORACENTESIS PUNCTURE RIGHT   Final Result      1. Ultrasound guided right sided diagnostic and therapeutic thoracentesis.      2. 500 mL of fluid withdrawn.      DX-CHEST-PORTABLE (1 VIEW)   Final Result         1. Moderate right pleural effusion and atelectasis unchanged.                  EC-ECHOCARDIOGRAM COMPLETE W/O CONT   Final Result      DX-CHEST-PORTABLE (1 VIEW)   Final Result      1.  Hypoinflation.   2.  Moderate right pleural effusion and underlying atelectasis or consolidation. As previously mentioned, possible underlying pneumonia.   3.  Minimal left pleural effusion and subsegmental atelectatic change.      DX-CHEST-LIMITED (1 VIEW)   Final Result      Airspace opacity in the right lower lobe, concerning for pneumonia.      CT-CTA COMPLETE THORACOABDOMINAL AORTA   Final Result      1.  No evidence for aortic aneurysm or dissection.   2.  Loculated right pleural effusion with associated compressive atelectasis and consolidation.   3.  Masslike area of consolidation in the left posterior medial costophrenic angle. This may be due to infection or neoplasm. Clinical correlation and follow-up is needed.   4.  Hepatomegaly with  fatty infiltration of the liver.                                  Therefore, he is discharged in good and stable condition to home with close outpatient follow-up.    The patient met 2-midnight criteria for an inpatient stay at the time of discharge.    Discharge Date  5/27/2025    FOLLOW UP ITEMS POST DISCHARGE      DISCHARGE DIAGNOSES  Principal Problem:    Atrial fibrillation with RVR (HCC) (POA: Yes)  Active Problems:    Sepsis (HCC) (POA: Yes)    Pleural effusion on right (POA: Yes)    QUE (acute kidney injury) (HCC) (POA: Yes)    High anion gap metabolic acidosis (POA: Yes)      Overview: 2/2 QUE and lactate       Cont to monitor    Mass of lower lobe of left lung (POA: Yes)    Acute hypoxic respiratory failure (HCC) (POA: Yes)    Hypertension (POA: No)    Shock (HCC) (POA: Unknown)        FOLLOW UP  No future appointments.  No follow-up provider specified.  Follow up with ZOHAIB Vaughan in 1 week  Follow up [facility] Card[facility] in [facility]. Echo EF normal RV normal atria normal. TSH normal range. Defer further work-up  Follow up with Dr. Proctor, thoracic surgery in 1 week to follow empyema. Currently nonsurgical on empiric antibiotics.   NURSING provide resources/pamphlets for  Antibiotics use, Check and record blood pressures at home at least twice a day for primary provider to review, check HR  MEDICATIONS ON DISCHARGE     Medication List        START taking these medications        Instructions   amoxicillin-clavulanate 875-125 MG Tabs  Commonly known as: Augmentin   Take 1 Tablet by mouth 2 times a day for 14 days.  Dose: 1 Tablet     folic acid 1 MG Tabs  Commonly known as: Folvite   Take 1 Tablet by mouth every day.  Dose: 1 mg     guaiFENesin dextromethorphan 100-10 MG/5ML Syrp syrup  Commonly known as: Robitussin DM   Take 5 mL by mouth every 6 hours.  Dose: 5 mL     metoprolol tartrate 25 MG Tabs  Commonly known as: Lopressor   Take 1 Tablet by mouth 2  times a day.  Dose: 25 mg     multivitamin Tabs   Take 1 Tablet by mouth every day.  Dose: 1 Tablet     thiamine 100 MG tablet  Commonly known as: Thiamine   Take 1 Tablet by mouth every day.  Dose: 100 mg            CHANGE how you take these medications        Instructions   benzonatate 100 MG Caps  What changed: additional instructions  Commonly known as: Tessalon   Take 1 Capsule by mouth 3 times a day as needed for Cough for up to 10 days.  Dose: 100 mg            CONTINUE taking these medications        Instructions   amLODIPine 10 MG Tabs  Commonly known as: Norvasc   Take 10 mg by mouth every day.  Dose: 10 mg     aspirin 325 MG Tabs  Commonly known as: Asa   Take 325-650 mg by mouth 1 time a day as needed for Mild Pain.  Dose: 325-650 mg     cyclobenzaprine 10 MG Tabs  Commonly known as: Flexeril   Take 10 mg by mouth 1 time a day as needed for Muscle Spasms. X 14 days  Dose: 10 mg     hydroCHLOROthiazide 25 MG Tabs   Take 25 mg by mouth every day.  Dose: 25 mg     losartan 100 MG Tabs  Commonly known as: Cozaar   Take 100 mg by mouth every day.  Dose: 100 mg            STOP taking these medications      ibuprofen 200 MG Tabs  Commonly known as: Motrin              Allergies  Allergies[1]    DIET  Orders Placed This Encounter   Procedures    Diet Order Diet: Regular     Standing Status:   Standing     Number of Occurrences:   1     Diet::   Regular [1]       ACTIVITY  Avoid heavy lifting or strenuous activity    CONSULTATIONS  See above    PROCEDURES  See Dr. Gallardo's PROC note    LABORATORY  Lab Results   Component Value Date    SODIUM 140 05/27/2025    POTASSIUM 4.3 05/27/2025    CHLORIDE 107 05/27/2025    CO2 20 05/27/2025    GLUCOSE 92 05/27/2025    BUN 15 05/27/2025    CREATININE 0.85 05/27/2025        Lab Results   Component Value Date    WBC 6.9 05/27/2025    HEMOGLOBIN 12.1 (L) 05/27/2025    HEMATOCRIT 37.0 (L) 05/27/2025    PLATELETCT 438 05/27/2025        Total time of the discharge process exceeds  55 minutes.       [1] No Known Allergies

## 2025-06-05 NOTE — Clinical Note
REFERRAL APPROVAL NOTICE         Sent on June 5, 2025                   Marlo Gusman  [address] [address]                   Dear Mr. Gusman,    After a careful review of the medical information and benefit coverage, [facility] has processed your referral. See below for additional details.    If applicable, you must be actively enrolled with your insurance for coverage of the authorized service. If you have any questions regarding your coverage, please contact your insurance directly.    REFERRAL INFORMATION   Referral #:  [id number]  Referred-To Provider    Referred-By Provider:  Surgery    Johnnie Huston M.D.   [facility]      [address] [address]  [phone] [id number]  [facility] [address]  [phone]    Referral Start Date:  05/27/2025  Referral End Date:   05/27/2026             SCHEDULING  If you do not already have an appointment, please call [phone] to make an appointment.     MORE INFORMATION  If you do not already have a [email or web address] account, sign up at: [email or web address]  You can access your medical information, make appointments, see lab results, billing information, and more.  If you have questions regarding this referral, please contact  the [facility] Referrals department at:             [phone]. Monday - Friday 8:00AM - 5:00PM.     Sincerely,    [facility]

## 2025-06-13 LAB
FUNGUS SPEC CULT: NORMAL
FUNGUS SPEC FUNGUS STN: NORMAL
SIGNIFICANT IND 70042: NORMAL
SITE SITE: NORMAL
SOURCE SOURCE: NORMAL

## 2025-07-12 LAB
FINAL REPORT Q0603: NORMAL
PRELIMINARY RPT Q0601: NORMAL
RHODAMINE-AURAMINE STN SPEC: NORMAL